# Patient Record
Sex: FEMALE | Race: BLACK OR AFRICAN AMERICAN | NOT HISPANIC OR LATINO | Employment: FULL TIME | URBAN - METROPOLITAN AREA
[De-identification: names, ages, dates, MRNs, and addresses within clinical notes are randomized per-mention and may not be internally consistent; named-entity substitution may affect disease eponyms.]

---

## 2017-03-08 ENCOUNTER — OFFICE VISIT (OUTPATIENT)
Dept: INTERNAL MEDICINE | Facility: CLINIC | Age: 42
End: 2017-03-08
Payer: COMMERCIAL

## 2017-03-08 VITALS
OXYGEN SATURATION: 99 % | HEART RATE: 76 BPM | WEIGHT: 168.63 LBS | HEIGHT: 63 IN | SYSTOLIC BLOOD PRESSURE: 112 MMHG | BODY MASS INDEX: 29.88 KG/M2 | DIASTOLIC BLOOD PRESSURE: 68 MMHG

## 2017-03-08 DIAGNOSIS — R51.9 NONINTRACTABLE HEADACHE, UNSPECIFIED CHRONICITY PATTERN, UNSPECIFIED HEADACHE TYPE: ICD-10-CM

## 2017-03-08 DIAGNOSIS — R11.10 VOMITING, INTRACTABILITY OF VOMITING NOT SPECIFIED, PRESENCE OF NAUSEA NOT SPECIFIED, UNSPECIFIED VOMITING TYPE: Primary | ICD-10-CM

## 2017-03-08 DIAGNOSIS — R05.9 COUGH: ICD-10-CM

## 2017-03-08 PROCEDURE — 99999 PR PBB SHADOW E&M-EST. PATIENT-LVL III: CPT | Mod: PBBFAC,,, | Performed by: FAMILY MEDICINE

## 2017-03-08 PROCEDURE — 99214 OFFICE O/P EST MOD 30 MIN: CPT | Mod: S$GLB,,, | Performed by: FAMILY MEDICINE

## 2017-03-08 RX ORDER — ONDANSETRON 4 MG/1
4-8 TABLET, FILM COATED ORAL EVERY 8 HOURS PRN
Qty: 30 TABLET | Refills: 0 | Status: SHIPPED | OUTPATIENT
Start: 2017-03-08 | End: 2018-03-14

## 2017-03-08 RX ORDER — OMEPRAZOLE 20 MG/1
20 CAPSULE, DELAYED RELEASE ORAL DAILY
Qty: 30 CAPSULE | Refills: 2 | Status: SHIPPED | OUTPATIENT
Start: 2017-03-08 | End: 2018-03-14

## 2017-03-08 NOTE — MR AVS SNAPSHOT
[facility]  [address]  [place] [address]  Phone: [phone]  Fax: [phone]                  Nora Conklin   3/8/2017 2:40 PM   Office Visit    Description:  Female : 1975   Provider:  Issac Temple MD   Department:  [facility] - Internal Medicine           Reason for Visit     Nausea           Diagnoses this Visit        Comments    Vomiting, intractability of vomiting not specified, presence of nausea not specified, unspecified vomiting type    -  Primary     Nonintractable headache, unspecified chronicity pattern, unspecified headache type         Cough                To Do List           Future Appointments        Provider Department Dept Phone    3/8/2017 2:40 PM Issac Temple MD [facility] [phone]      Goals (5 Years of Data)     None      Follow-Up and Disposition     Return if symptoms worsen or fail to improve, for Call or message through Portal for status update..       These Medications        Disp Refills Start End    omeprazole (PRILOSEC) 20 MG capsule 30 capsule 2 3/8/2017     Take 1 capsule (20 mg total) by mouth once daily. - Oral    Pharmacy: [facility] - [place], [place] - [address] Ph #: [phone]       ondansetron (ZOFRAN) 4 MG tablet 30 tablet 0 3/8/2017     Take 1-2 tablets (4-8 mg total) by mouth every 8 (eight) hours as needed for Nausea. - Oral    Pharmacy: [facility] - [place], [place] - [address] Ph #: [phone]         [facility]     [facility] Care [facility] -  Assistance  Registered nurses in the [facility] provide clinical advisement, health education, appointment booking, and other advisory services.  Call for this free service at [phone].             Medications           Message regarding Medications     Verify the changes and/or additions to your medication regime listed below are the same as  "discussed with your clinician today.  If any of these changes or additions are incorrect, please notify your healthcare provider.        START taking these NEW medications        Refills    omeprazole (PRILOSEC) 20 MG capsule 2    Sig: Take 1 capsule (20 mg total) by mouth once daily.    Class: Normal    Route: Oral    ondansetron (ZOFRAN) 4 MG tablet 0    Sig: Take 1-2 tablets (4-8 mg total) by mouth every 8 (eight) hours as needed for Nausea.    Class: Normal    Route: Oral           Verify that the below list of medications is an accurate representation of the medications you are currently taking.  If none reported, the list may be blank. If incorrect, please contact your healthcare provider. Carry this list with you in case of emergency.           Current Medications     amitriptyline (ELAVIL) 10 MG tablet 2 pills every night around dinner time every night    ASPIRIN/ACETAMINOPHEN/CAFFEINE (GOODY'S MIGRAINE RELIEF ORAL) Take by mouth.    sumatriptan (IMITREX) 100 MG tablet Onset migraine, 1 tab, second 2 hours later, no more 2 tabs day/3 days use in week    omeprazole (PRILOSEC) 20 MG capsule Take 1 capsule (20 mg total) by mouth once daily.    ondansetron (ZOFRAN) 4 MG tablet Take 1-2 tablets (4-8 mg total) by mouth every 8 (eight) hours as needed for Nausea.           Clinical Reference Information           Your Vitals Were     BP Pulse Height Weight SpO2 BMI    112/68 (BP Location: Left arm, Patient Position: Sitting) 76 5' 3" (1.6 m) 76.5 kg (168 lb 10.4 oz) 99% 29.88 kg/m2      Blood Pressure          Most Recent Value    BP  112/68      Allergies as of 3/8/2017     No Known Allergies      Immunizations Administered on Date of Encounter - 3/8/2017     None      Language Assistance Services     ATTENTION: Language assistance services are available, free of charge. Please call [phone].      ATENCIÓN: Si habla christy, tiene a ovalle disposición servicios gratuitos de asistencia lingüística. Llame al " [phone].     SIMONE Ý: N?u b?n nói Ti?ng Vi?t, có các d?ch v? h? tr? ngôn ng? mi?n phí dành cho b?n. G?i s? [phone].         [facility] complies with applicable Federal civil rights laws and does not discriminate on the basis of race, color, national origin, age, disability, or sex.
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
## 2017-07-24 ENCOUNTER — LAB VISIT (OUTPATIENT)
Dept: LAB | Facility: HOSPITAL | Age: 42
End: 2017-07-24
Attending: INTERNAL MEDICINE
Payer: COMMERCIAL

## 2017-07-24 DIAGNOSIS — R53.83 FATIGUE, UNSPECIFIED TYPE: Primary | ICD-10-CM

## 2017-07-24 DIAGNOSIS — R53.83 FATIGUE, UNSPECIFIED TYPE: ICD-10-CM

## 2017-07-24 LAB
25(OH)D3+25(OH)D2 SERPL-MCNC: 44 NG/ML
ALBUMIN SERPL BCP-MCNC: 3.6 G/DL
ALP SERPL-CCNC: 62 U/L
ALT SERPL W/O P-5'-P-CCNC: 10 U/L
ANION GAP SERPL CALC-SCNC: 9 MMOL/L
AST SERPL-CCNC: 14 U/L
BASOPHILS # BLD AUTO: 0.03 K/UL
BASOPHILS NFR BLD: 0.5 %
BILIRUB SERPL-MCNC: 0.6 MG/DL
BUN SERPL-MCNC: 15 MG/DL
CALCIUM SERPL-MCNC: 8.9 MG/DL
CHLORIDE SERPL-SCNC: 104 MMOL/L
CO2 SERPL-SCNC: 24 MMOL/L
CREAT SERPL-MCNC: 0.7 MG/DL
DIFFERENTIAL METHOD: ABNORMAL
EOSINOPHIL # BLD AUTO: 0.2 K/UL
EOSINOPHIL NFR BLD: 3.3 %
ERYTHROCYTE [DISTWIDTH] IN BLOOD BY AUTOMATED COUNT: 12.6 %
EST. GFR  (AFRICAN AMERICAN): >60 ML/MIN/1.73 M^2
EST. GFR  (NON AFRICAN AMERICAN): >60 ML/MIN/1.73 M^2
GLUCOSE SERPL-MCNC: 79 MG/DL
HCT VFR BLD AUTO: 36.2 %
HGB BLD-MCNC: 12.2 G/DL
LYMPHOCYTES # BLD AUTO: 3.1 K/UL
LYMPHOCYTES NFR BLD: 50.5 %
MCH RBC QN AUTO: 30 PG
MCHC RBC AUTO-ENTMCNC: 33.7 G/DL
MCV RBC AUTO: 89 FL
MONOCYTES # BLD AUTO: 0.5 K/UL
MONOCYTES NFR BLD: 8.9 %
NEUTROPHILS # BLD AUTO: 2.2 K/UL
NEUTROPHILS NFR BLD: 36.6 %
PLATELET # BLD AUTO: 248 K/UL
PMV BLD AUTO: 9.7 FL
POTASSIUM SERPL-SCNC: 3.7 MMOL/L
PROT SERPL-MCNC: 7.4 G/DL
RBC # BLD AUTO: 4.06 M/UL
SODIUM SERPL-SCNC: 137 MMOL/L
TSH SERPL DL<=0.005 MIU/L-ACNC: 1.9 UIU/ML
WBC # BLD AUTO: 6.06 K/UL

## 2017-07-24 PROCEDURE — 82306 VITAMIN D 25 HYDROXY: CPT

## 2017-07-24 PROCEDURE — 80053 COMPREHEN METABOLIC PANEL: CPT

## 2017-07-24 PROCEDURE — 84443 ASSAY THYROID STIM HORMONE: CPT

## 2017-07-24 PROCEDURE — 36415 COLL VENOUS BLD VENIPUNCTURE: CPT

## 2017-07-24 PROCEDURE — 85025 COMPLETE CBC W/AUTO DIFF WBC: CPT

## 2017-10-24 ENCOUNTER — OFFICE VISIT (OUTPATIENT)
Dept: INTERNAL MEDICINE | Facility: CLINIC | Age: 42
End: 2017-10-24
Payer: COMMERCIAL

## 2017-10-24 ENCOUNTER — HOSPITAL ENCOUNTER (OUTPATIENT)
Dept: RADIOLOGY | Facility: HOSPITAL | Age: 42
Discharge: HOME | End: 2017-10-24
Attending: INTERNAL MEDICINE
Payer: COMMERCIAL

## 2017-10-24 VITALS
HEIGHT: 63 IN | HEART RATE: 90 BPM | WEIGHT: 167.31 LBS | TEMPERATURE: 98 F | DIASTOLIC BLOOD PRESSURE: 64 MMHG | SYSTOLIC BLOOD PRESSURE: 122 MMHG | BODY MASS INDEX: 29.64 KG/M2 | OXYGEN SATURATION: 99 %

## 2017-10-24 DIAGNOSIS — M54.50 ACUTE BILATERAL LOW BACK PAIN WITHOUT SCIATICA: Primary | ICD-10-CM

## 2017-10-24 DIAGNOSIS — M54.50 ACUTE BILATERAL LOW BACK PAIN WITHOUT SCIATICA: ICD-10-CM

## 2017-10-24 DIAGNOSIS — R82.90 ABNORMAL URINALYSIS: ICD-10-CM

## 2017-10-24 LAB
BILIRUB SERPL-MCNC: ABNORMAL MG/DL
BLOOD URINE, POC: 50
COLOR, POC UA: YELLOW
GLUCOSE UR QL STRIP: NORMAL
KETONES UR QL STRIP: ABNORMAL
LEUKOCYTE ESTERASE URINE, POC: ABNORMAL
NITRITE, POC UA: ABNORMAL
PH, POC UA: 5
PROTEIN, POC: ABNORMAL
SPECIFIC GRAVITY, POC UA: 1.02
UROBILINOGEN, POC UA: NORMAL

## 2017-10-24 PROCEDURE — 99213 OFFICE O/P EST LOW 20 MIN: CPT | Mod: 25,S$GLB,, | Performed by: INTERNAL MEDICINE

## 2017-10-24 PROCEDURE — 72110 X-RAY EXAM L-2 SPINE 4/>VWS: CPT | Mod: 26,,, | Performed by: RADIOLOGY

## 2017-10-24 PROCEDURE — 87086 URINE CULTURE/COLONY COUNT: CPT

## 2017-10-24 PROCEDURE — 99999 PR PBB SHADOW E&M-EST. PATIENT-LVL IV: CPT | Mod: PBBFAC,,, | Performed by: INTERNAL MEDICINE

## 2017-10-24 PROCEDURE — 72110 X-RAY EXAM L-2 SPINE 4/>VWS: CPT | Mod: TC

## 2017-10-24 PROCEDURE — 81002 URINALYSIS NONAUTO W/O SCOPE: CPT | Mod: S$GLB,,, | Performed by: INTERNAL MEDICINE

## 2017-10-24 RX ORDER — CYCLOBENZAPRINE HCL 5 MG
5 TABLET ORAL NIGHTLY
Qty: 10 TABLET | Refills: 0 | Status: SHIPPED | OUTPATIENT
Start: 2017-10-24 | End: 2017-11-03

## 2017-10-24 RX ORDER — IBUPROFEN 800 MG/1
800 TABLET ORAL EVERY 6 HOURS PRN
Qty: 40 TABLET | Refills: 0 | Status: SHIPPED | OUTPATIENT
Start: 2017-10-24 | End: 2017-11-03
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
## 2017-10-25 LAB
BACTERIA UR CULT: NORMAL
BACTERIA UR CULT: NORMAL

## 2018-03-14 ENCOUNTER — HOSPITAL ENCOUNTER (OUTPATIENT)
Dept: RADIOLOGY | Facility: OTHER | Age: 43
Discharge: HOME | End: 2018-03-14
Attending: OBSTETRICS & GYNECOLOGY
Payer: COMMERCIAL

## 2018-03-14 ENCOUNTER — OFFICE VISIT (OUTPATIENT)
Dept: OBSTETRICS AND GYNECOLOGY | Facility: CLINIC | Age: 43
End: 2018-03-14
Payer: COMMERCIAL

## 2018-03-14 VITALS
SYSTOLIC BLOOD PRESSURE: 102 MMHG | DIASTOLIC BLOOD PRESSURE: 70 MMHG | BODY MASS INDEX: 30.89 KG/M2 | WEIGHT: 174.38 LBS

## 2018-03-14 DIAGNOSIS — Z12.39 BREAST CANCER SCREENING: ICD-10-CM

## 2018-03-14 DIAGNOSIS — R19.00 PELVIC MASS: ICD-10-CM

## 2018-03-14 DIAGNOSIS — N94.3 PMS (PREMENSTRUAL SYNDROME): ICD-10-CM

## 2018-03-14 DIAGNOSIS — Z01.419 ENCOUNTER FOR GYNECOLOGICAL EXAMINATION WITHOUT ABNORMAL FINDING: Primary | ICD-10-CM

## 2018-03-14 DIAGNOSIS — Z11.3 SCREEN FOR STD (SEXUALLY TRANSMITTED DISEASE): ICD-10-CM

## 2018-03-14 PROCEDURE — 87624 HPV HI-RISK TYP POOLED RSLT: CPT

## 2018-03-14 PROCEDURE — 76856 US EXAM PELVIC COMPLETE: CPT | Mod: 26,,, | Performed by: INTERNAL MEDICINE

## 2018-03-14 PROCEDURE — 87491 CHLMYD TRACH DNA AMP PROBE: CPT

## 2018-03-14 PROCEDURE — 76830 TRANSVAGINAL US NON-OB: CPT | Mod: 26,,, | Performed by: INTERNAL MEDICINE

## 2018-03-14 PROCEDURE — 99386 PREV VISIT NEW AGE 40-64: CPT | Mod: S$GLB,,, | Performed by: OBSTETRICS & GYNECOLOGY

## 2018-03-14 PROCEDURE — 76830 TRANSVAGINAL US NON-OB: CPT | Mod: TC

## 2018-03-14 PROCEDURE — 88175 CYTOPATH C/V AUTO FLUID REDO: CPT

## 2018-03-14 PROCEDURE — 99999 PR PBB SHADOW E&M-EST. PATIENT-LVL III: CPT | Mod: PBBFAC,,, | Performed by: OBSTETRICS & GYNECOLOGY

## 2018-03-14 NOTE — PROGRESS NOTES
CC: Well woman exam    Nora Conklin is a 43 y.o. female  presents for a well woman exam.  She had a very light cycle and then a normal cycle  After that one cycle.        Past Medical History:   Diagnosis Date    Medical history non-contributory     Migraine headache        Past Surgical History:   Procedure Laterality Date     SECTION         OB History    Para Term  AB Living   3         3   SAB TAB Ectopic Multiple Live Births                  # Outcome Date GA Lbr Jamar/2nd Weight Sex Delivery Anes PTL Lv   3             2             1                    Family History   Problem Relation Age of Onset    Cancer Maternal Grandmother      lung     Cervical cancer Mother     Valvular heart disease Maternal Aunt        Social History   Substance Use Topics    Smoking status: Never Smoker    Smokeless tobacco: Never Used    Alcohol use 0.0 oz/week      Comment: rare       /70   Wt 79.1 kg (174 lb 6.1 oz)   LMP 2018   BMI 30.89 kg/m²     ROS:  GENERAL: Denies weight gain or weight loss. Feeling well overall.   SKIN: Denies rash or lesions.   HEAD: Denies head injury or headache.   NODES: Denies enlarged lymph nodes.   CHEST: Denies chest pain or shortness of breath.   CARDIOVASCULAR: Denies palpitations or left sided chest pain.   ABDOMEN: No abdominal pain, constipation, diarrhea, nausea, vomiting or rectal bleeding.   URINARY: No frequency, dysuria, hematuria, or burning on urination.  REPRODUCTIVE: See HPI.   BREASTS: The patient performs breast self-examination and denies pain, lumps, or nipple discharge.   HEMATOLOGIC: No easy bruisability or excessive bleeding.  MUSCULOSKELETAL: Denies joint pain or swelling.   NEUROLOGIC: Denies syncope or weakness.   PSYCHIATRIC: Denies depression, anxiety or mood swings.    Physical Exam:    APPEARANCE: Well nourished, well developed, in no acute distress.  AFFECT: WNL, alert and oriented x 3  SKIN: No  acne or hirsutism  NECK: Neck symmetric without masses or thyromegaly  NODES: No inguinal, cervical, axillary, or femoral lymph node enlargement  CHEST: Good respiratory effect  ABDOMEN: Soft.  No tenderness or masses.  No hepatosplenomegaly.  No hernias.  BREASTS: Symmetrical, no skin changes or visible lesions.  No palpable masses, nipple discharge bilaterally.  PELVIC: Normal external genitalia without lesions.  Normal hair distribution.  Adequate perineal body, normal urethral meatus.  Vagina moist and well rugated.  The cervix has a thin membranous adhesion of the cervix to the left side wall of the vagina.  It bleeds with manipulation.  Cervix pink, without lesions, discharge or tenderness.  No significant cystocele or rectocele.  Bimanual exam shows uterus to be normal size, regular, mobile and nontender.  Adnexa with nodular masses on the right ovary to vaginal sidewall where the adhesion is present MILD  tenderness.    EXTREMITIES: No edema.    ASSESSMENT AND PLAN  1. Encounter for gynecological examination without abnormal finding  Liquid-based pap smear, screening    HPV High Risk Genotypes, PCR   2. Screen for STD (sexually transmitted disease)  C. trachomatis/N. gonorrhoeae by AMP DNA Cervix    HIV-1 and HIV-2 antibodies    RPR    Hepatitis panel, acute   3. Breast cancer screening  Mammo Digital Screening Bilat with Tomosynthesis_CAD   4. PMS (premenstrual syndrome)     5. Pelvic mass  US Pelvis Comp with Transvag NON-OB (xpd     Follow up on the pelvic nodular mass and the vaginal findings  Plan to remove the adhesion.     Patient was counseled today on A.C.S. Pap guidelines and recommendations for yearly pelvic exams, mammograms and monthly self breast exams; to see her PCP for other health maintenance.     F/U after the US

## 2018-03-16 LAB
C TRACH DNA SPEC QL NAA+PROBE: NOT DETECTED
N GONORRHOEA DNA SPEC QL NAA+PROBE: NOT DETECTED

## 2018-03-19 ENCOUNTER — OFFICE VISIT (OUTPATIENT)
Dept: INTERNAL MEDICINE | Facility: CLINIC | Age: 43
End: 2018-03-19
Attending: FAMILY MEDICINE
Payer: COMMERCIAL

## 2018-03-19 ENCOUNTER — LAB VISIT (OUTPATIENT)
Dept: LAB | Facility: OTHER | Age: 43
End: 2018-03-19
Attending: FAMILY MEDICINE
Payer: COMMERCIAL

## 2018-03-19 VITALS — HEIGHT: 63 IN | BODY MASS INDEX: 31.25 KG/M2 | WEIGHT: 176.38 LBS

## 2018-03-19 DIAGNOSIS — Z00.00 ANNUAL PHYSICAL EXAM: ICD-10-CM

## 2018-03-19 DIAGNOSIS — G43.809 OTHER MIGRAINE WITHOUT STATUS MIGRAINOSUS, NOT INTRACTABLE: ICD-10-CM

## 2018-03-19 DIAGNOSIS — N94.6 MENSTRUAL CRAMP: ICD-10-CM

## 2018-03-19 DIAGNOSIS — Z00.00 ANNUAL PHYSICAL EXAM: Primary | ICD-10-CM

## 2018-03-19 LAB
25(OH)D3+25(OH)D2 SERPL-MCNC: 7 NG/ML
ALBUMIN SERPL BCP-MCNC: 3.4 G/DL
ALP SERPL-CCNC: 56 U/L
ALT SERPL W/O P-5'-P-CCNC: 12 U/L
ANION GAP SERPL CALC-SCNC: 7 MMOL/L
AST SERPL-CCNC: 14 U/L
BASOPHILS # BLD AUTO: 0.02 K/UL
BASOPHILS NFR BLD: 0.4 %
BILIRUB SERPL-MCNC: 0.4 MG/DL
BUN SERPL-MCNC: 14 MG/DL
CALCIUM SERPL-MCNC: 8.3 MG/DL
CHLORIDE SERPL-SCNC: 107 MMOL/L
CHOLEST SERPL-MCNC: 188 MG/DL
CHOLEST/HDLC SERPL: 4.3 {RATIO}
CO2 SERPL-SCNC: 26 MMOL/L
CREAT SERPL-MCNC: 0.7 MG/DL
DIFFERENTIAL METHOD: ABNORMAL
EOSINOPHIL # BLD AUTO: 0.2 K/UL
EOSINOPHIL NFR BLD: 3.7 %
ERYTHROCYTE [DISTWIDTH] IN BLOOD BY AUTOMATED COUNT: 12.9 %
EST. GFR  (AFRICAN AMERICAN): >60 ML/MIN/1.73 M^2
EST. GFR  (NON AFRICAN AMERICAN): >60 ML/MIN/1.73 M^2
ESTIMATED AVG GLUCOSE: 105 MG/DL
GLUCOSE SERPL-MCNC: 78 MG/DL
HBA1C MFR BLD HPLC: 5.3 %
HCT VFR BLD AUTO: 34 %
HDLC SERPL-MCNC: 44 MG/DL
HDLC SERPL: 23.4 %
HGB BLD-MCNC: 11.1 G/DL
LDLC SERPL CALC-MCNC: 129.4 MG/DL
LYMPHOCYTES # BLD AUTO: 2.3 K/UL
LYMPHOCYTES NFR BLD: 44.7 %
MCH RBC QN AUTO: 29.6 PG
MCHC RBC AUTO-ENTMCNC: 32.6 G/DL
MCV RBC AUTO: 91 FL
MONOCYTES # BLD AUTO: 0.5 K/UL
MONOCYTES NFR BLD: 9.9 %
NEUTROPHILS # BLD AUTO: 2.1 K/UL
NEUTROPHILS NFR BLD: 41.3 %
NONHDLC SERPL-MCNC: 144 MG/DL
PLATELET # BLD AUTO: 265 K/UL
PMV BLD AUTO: 9.9 FL
POTASSIUM SERPL-SCNC: 3.3 MMOL/L
PROT SERPL-MCNC: 6.9 G/DL
RBC # BLD AUTO: 3.75 M/UL
SODIUM SERPL-SCNC: 140 MMOL/L
TRIGL SERPL-MCNC: 73 MG/DL
TSH SERPL DL<=0.005 MIU/L-ACNC: 1.39 UIU/ML
WBC # BLD AUTO: 5.15 K/UL

## 2018-03-19 PROCEDURE — 80053 COMPREHEN METABOLIC PANEL: CPT

## 2018-03-19 PROCEDURE — 36415 COLL VENOUS BLD VENIPUNCTURE: CPT

## 2018-03-19 PROCEDURE — 80061 LIPID PANEL: CPT

## 2018-03-19 PROCEDURE — 84443 ASSAY THYROID STIM HORMONE: CPT

## 2018-03-19 PROCEDURE — 85025 COMPLETE CBC W/AUTO DIFF WBC: CPT

## 2018-03-19 PROCEDURE — 99999 PR PBB SHADOW E&M-EST. PATIENT-LVL III: CPT | Mod: PBBFAC,,, | Performed by: FAMILY MEDICINE

## 2018-03-19 PROCEDURE — 83036 HEMOGLOBIN GLYCOSYLATED A1C: CPT

## 2018-03-19 PROCEDURE — 82306 VITAMIN D 25 HYDROXY: CPT

## 2018-03-19 PROCEDURE — 99396 PREV VISIT EST AGE 40-64: CPT | Mod: S$GLB,,, | Performed by: FAMILY MEDICINE

## 2018-03-19 RX ORDER — IBUPROFEN 800 MG/1
800 TABLET ORAL 3 TIMES DAILY PRN
Qty: 90 TABLET | Refills: 1 | Status: SHIPPED | OUTPATIENT
Start: 2018-03-19 | End: 2019-02-11 | Stop reason: SDUPTHER

## 2018-03-19 RX ORDER — AMITRIPTYLINE HYDROCHLORIDE 10 MG/1
TABLET, FILM COATED ORAL
Qty: 180 TABLET | Refills: 3 | Status: SHIPPED | OUTPATIENT
Start: 2018-03-19 | End: 2019-04-16 | Stop reason: SDUPTHER

## 2018-03-19 RX ORDER — SUMATRIPTAN SUCCINATE 100 MG/1
TABLET ORAL
Qty: 12 TABLET | Refills: 3 | Status: SHIPPED | OUTPATIENT
Start: 2018-03-19 | End: 2022-05-10 | Stop reason: CLARIF

## 2018-03-19 NOTE — PATIENT INSTRUCTIONS
80-90 fl oz water daily     Meal Ideas for Regular Bariatric Diet  *Recipes and products available at [email or web address]      Breakfast: (15-20g protein)    - Egg white omelet: 2 egg whites or ½ cup Egg Beaters. (Optional proteins: cheese, shrimp, black beans, chicken, sliced turkey) (Optional veggies: tomatoes, salsa, spinach, mushrooms, onions, green peppers, or small slice avocado)     - Egg and sausage: 1 egg or ¼ cup Egg Beaters (any variety), with 1 tabitha or 2 links of Turkey sausage or Veggie breakfast sausage ([employer] or "[employer]    - Crust-less breakfast quiche: To make a glass pie dish, mix 4oz part skim Ricotta, 1 cup skim milk, and 2 eggs as your base. Add protein: shredded cheese, sliced lean ham or turkey, turkey golden/sausage. Add veggies: tomato, onion, green onion, mushroom, green pepper, spinach, etc.    - Yogurt parfait: Mix 1 - 6oz container Dannon Light N Fit vanilla yogurt, with ¼ cup Kashi Go Lean cereal    - Cottage cheese and fruit: ½ cup part-skim cottage cheese or ricotta cheese topped with fresh fruit or sugar free preserves     - Sharon Agata's Vanilla Egg custard* (add 2 Tbsp instant coffee granules to make Cappuccino Custard*)    - Hi-Protein café latte (skim milk, decaf coffee, 1 scoop protein powder). Optional to add Sugar free syrup or extract flavoring.    Lunch: (20-30g protein)    - ½ cup Black bean soup (Homemade or Progresso), with ¼ cup shredded low-fat cheese. Top with chopped tomato or fresh salsa.     - Lean deli turkey breast and low-fat sliced cheese, mustard or light calderon to moisten, rolled up together, or wrapped in a Hcino lettuce leaf    - Chicken salad made from dinner leftovers, moisten with low-fat salad dressing or light calderon. Also try leftover salmon, shrimp, tuna or boiled eggs. Serve ½ cup over dark green salad    - Fat-free canned refried beans, topped with ¼ cup shredded low-fat cheese. Top with chopped tomato or fresh salsa.     - Greek salad:  Top mixed greens with 1-2oz grilled chicken, tomatoes, red onions, 2-3 kalamata olives, and sprinkle lightly with feta cheese. Spritz with Balsamic vinegar to taste.     - Crust-less lunch quiche: To make a glass pie dish, mix 4oz part skim Ricotta, 1 cup skim milk, and 2 eggs as your base. Add protein: shredded cheese, sliced lean ham or turkey, shrimp, chicken. Add veggies: tomato, onion, green onion, mushroom, green pepper, spinach, artichoke, broccoli, etc.    - Pizza bake: tomato sauce, low-fat shredded mozzarella and turkey pepperoni or [place] golden. Add any veggies.    - Cucumber crab bites: Spread ¼ cup crab dip (lump crabmeat + light cream cheese and green onions) over sliced cucumber.     - Chicken with light spinach and artichoke dip*: Puree in : 6oz cooked and drained spinach, 2 cloves garlic, 1 can cannelloni beans, ½ cup chopped green onions, 1 can drained artichoke hearts (not marinated in oil), lemon juice and basil. Mix in 2oz chopped up chicken.    Supper: (20-30g protein)    - Serve grilled fish over dark green salad tossed with low-fat dressing, served with grilled asparagus brownlee     - Rotisserie chicken salad: served with sliced strawberries, walnuts, fat-free feta cheese crumbles and 1 tbsp Ahumadas Own Light Raspberry [place]wood Vinaigrette    - Shrimp cocktail: Dip cold boiled shrimp in homemade low-sugar cocktail sauce (1/2 cup Orlando One Carb ketchup, 2 tbsp horseradish, 1/4 tsp hot sauce, 1 tsp Worcestershire sauce, 1 tbsp freshly-squeezed lemon juice). Serve with dark green salad, walnuts, and crumbled blue cheese drizzled with olive oil and Balsamic vinegar    - Tuna Melt: Spread tuna salad onto 2 thick slices of tomato. Top with low-fat cheese and broil until cheese is melted. May also be made with chicken salad of shrimp salad. Rena Lara with different types of cheeses.    - Homemade low-fat Chili using extra lean ground beef or ground turkey. Top with shredded cheese  and salsa as desired. May add dollop fat-free sour cream if desired    - Dinner Omelet with shrimp or chicken and onion, green peppers and chives.    - No noodle lasagna: Use sliced zucchini or eggplant in place of noodles.  Layer with part skim ricotta cheese and low sugar meat sauce (use very lean ground beef or ground turkey).    - Mexican chicken bake: Bake chunks of chicken breast or thigh with taco seasoning, Pace brand enchilada sauce, green onions and low-fat cheese. Serve with ¼ cup black beans or fat free refried beans topped with chopped tomatoes or salsa.    - Alanis frozen meatballs, simmered in Classico Marinara sauce. Different flavors of salsa or spaghetti sauce create different dishes! Sprinkle with parmesan cheese. Serve with grilled or steamed veggies, or a dark green salad.    - Simmer boneless skinless chicken thigh chunks in Classico Marinara sauce or roasted salsa until tender with chopped onion, bell pepper, garlic, mushrooms, spinach, etc.     - Hamburger, without the bun, dressed the way you like. Served with grilled or steamed veggies.    - Eggplant parmesan: Bake slices of eggplant at 350 degrees for 15 minutes. Layer tomato sauce, sliced eggplant and low-fat mozzarella cheese in a baking dish and cover with foil. Bake 30-40 more minutes or until bubbly. Uncover and bake at 400 degrees for about 15 more minutes, or until top is slightly crisp.    - Fish tacos: grilled/baked white fish, wrapped in Chino lettuce leaf, topped with salsa, shredded low-fat cheese, and light coleslaw.    Snacks: (100-200 calories; >5g protein)    - 1 low-fat cheese stick with 8 cherry tomatoes or 1 serving fresh fruit  - 4 thin slices fat-free turkey breast and 1 slice low-fat cheese  - 4 thin slices fat-free honey ham with wedge of melon  - 1/4 cup unsalted nuts with ½ cup fruit  - 6-oz container Dannon Light n Fit vanilla yogurt, topped with 1oz unsalted nuts         - apple, celery or baby carrots  spread with 2 Tbsp natural peanut butter or almond butter   - apple slices with 1 oz slice low-fat cheese  - celery, cucumber, bell pepper or baby carrots dipped in ¼ cup hummus bean spread or light spinach and artichoke dip (*recipe in lunch section)  - 100 calorie bag microwave light popcorn with 3 tbsp grated parmesan cheese  - Jett Links Beef Steak - 14g protein! (similar to beef jerky)  - 2 wedges Laughing Cow - Light Herb & Garlic Cheese with sliced cucumber or green bell pepper  - 1/2 cup low-fat cottage cheese with ¼ cup fruit or ¼ cup salsa  - RTD Protein drinks: Atkins, Low Carb Slim Fast, EAS light, Muscle Milk Light, etc.  - Homemade Protein drinks: GNC Soy95, Isopure, Nectar, UNJURY, Whey Gourmet, etc. Mix 1 scoop powder with 8oz skim/1% milk or light soymilk.  - Protein bars: Atkins, EAS, Pure Protein, Think Thin, Detour, etc. Must have 0-4 grams sugar - Read the label.    Takeout Options: No more than twice/week  Deli - Salads (no pasta or rice), meats, cheeses. Roasted chicken. Lox (salmon)    Mexican - Platters which don't include tortillas, chips, or rice. Go easy on the beans. Example: Fajitas without the tortillas. Ask the  not to bring chips to the table if they are too tempting.    Greek - Meat or fish and vegetable, but no bread or rice. Including hummus, baba ganoush, etc, is OK. Most sit-down Greek restaurants can provide you with cucumber slices for dipping instead of kenia bread.    Fast Food (Avoid as much as possible) - Salads (no croutons and limit salad dressing to 2 tbsp), grilled chicken sandwich without the bun and ask for no calderon. Mindys low fat chili or Taco Bell pintos and cheese.    BBQ - The meats are fine if you ask for sauces on the side, but most of the traditional side dishes are loaded with carbs. Alban slaw, baked beans and BBQ sauce are typically made with sugar.    Chinese - Nothing deep-fried, no rice or noodles. Many Chinese sauces have starch and sugar in them,  so you'll have to use your judgement. If you find that these sauces trigger cravings, or cause Dumping, you can ask for the sauce to be made without sugar or just use soy sauce.

## 2018-03-19 NOTE — PROGRESS NOTES
"Subjective:      Patient ID: Nora Conklin is a 43 y.o. female.    Chief Complaint: Annual Exam    She is here for annual exam. She wants ibuprofen for her menstrual cramps. This typically helps with her pain. She does have history of migraines and she has noticed they come once a week. The elavil does help a lot to reduce the frequency. She will take the imitrex to stop the headache which does help.       Review of Systems   Constitutional: Negative.    HENT: Negative.    Respiratory: Negative.    Cardiovascular: Negative.    Gastrointestinal: Negative.    Genitourinary: Negative.    Neurological: Positive for headaches.     I personally reviewed Past Medical History, Past Surgical history,  Past Social History and Family History    Objective:   Ht 5' 3" (1.6 m)   Wt 80 kg (176 lb 5.9 oz)   LMP 02/24/2018   BMI 31.24 kg/m²     Physical Exam   Constitutional: She is oriented to person, place, and time. She appears well-developed and well-nourished. No distress.   HENT:   Head: Normocephalic.   Right Ear: External ear normal.   Left Ear: External ear normal.   Mouth/Throat: Oropharynx is clear and moist.   Eyes: Conjunctivae and EOM are normal. Pupils are equal, round, and reactive to light. Right eye exhibits no discharge. Left eye exhibits no discharge. No scleral icterus.   Neck: Normal range of motion. No tracheal deviation present. No thyromegaly present.   Cardiovascular: Normal rate, regular rhythm, normal heart sounds and intact distal pulses.  Exam reveals no gallop.    No murmur heard.  Pulmonary/Chest: Effort normal and breath sounds normal. No respiratory distress. She has no wheezes. She has no rales. She exhibits no tenderness.   Abdominal: Soft. Bowel sounds are normal. She exhibits no distension and no mass. There is no tenderness. There is no rebound and no guarding.   Musculoskeletal: Normal range of motion.   Neurological: She is alert and oriented to person, place, and time.   Skin: Skin is " warm and dry.   Psychiatric: She has a normal mood and affect. Her behavior is normal. Judgment and thought content normal.   Vitals reviewed.      Nora was seen today for annual exam.    Diagnoses and all orders for this visit:    Annual physical exam  -     CBC auto differential; Future  -     Comprehensive metabolic panel; Future  -     Hemoglobin A1c; Future  -     Lipid panel; Future  -     TSH; Future  -     Vitamin D; Future    Other migraine without status migrainosus, not intractable  -improved on elavil and imitrex prn   -call if no improvement once resuming regimen     Menstrual cramp  -ibuprofen prn and patient not to take goody's with it or the days surrounding use     Other orders  -     amitriptyline (ELAVIL) 10 MG tablet; 2 pills every night around dinner time every night  -     sumatriptan (IMITREX) 100 MG tablet; Onset migraine, 1 tab, second 2 hours later, no more 2 tabs day/3 days use in week  -     ibuprofen (ADVIL,MOTRIN) 800 MG tablet; Take 1 tablet (800 mg total) by mouth 3 (three) times daily as needed for Pain.

## 2018-03-20 LAB
HPV16 AG SPEC QL: NEGATIVE
HPV16+18+H RISK 12 DNA CVX-IMP: NEGATIVE
HPV18 DNA SPEC QL NAA+PROBE: NEGATIVE

## 2018-03-28 ENCOUNTER — PATIENT MESSAGE (OUTPATIENT)
Dept: INTERNAL MEDICINE | Facility: CLINIC | Age: 43
End: 2018-03-28

## 2018-03-28 ENCOUNTER — OFFICE VISIT (OUTPATIENT)
Dept: OBSTETRICS AND GYNECOLOGY | Facility: CLINIC | Age: 43
End: 2018-03-28
Payer: COMMERCIAL

## 2018-03-28 VITALS
WEIGHT: 173.06 LBS | HEIGHT: 63 IN | BODY MASS INDEX: 30.66 KG/M2 | DIASTOLIC BLOOD PRESSURE: 70 MMHG | SYSTOLIC BLOOD PRESSURE: 110 MMHG

## 2018-03-28 DIAGNOSIS — D64.9 NORMOCYTIC ANEMIA: Primary | ICD-10-CM

## 2018-03-28 DIAGNOSIS — N83.209 CYST OF OVARY, UNSPECIFIED LATERALITY: Primary | ICD-10-CM

## 2018-03-28 DIAGNOSIS — E87.6 HYPOKALEMIA: ICD-10-CM

## 2018-03-28 DIAGNOSIS — N89.5 VAGINAL ADHESION: ICD-10-CM

## 2018-03-28 PROCEDURE — 99213 OFFICE O/P EST LOW 20 MIN: CPT | Mod: S$GLB,,, | Performed by: OBSTETRICS & GYNECOLOGY

## 2018-03-28 PROCEDURE — 99999 PR PBB SHADOW E&M-EST. PATIENT-LVL III: CPT | Mod: PBBFAC,,, | Performed by: OBSTETRICS & GYNECOLOGY

## 2018-03-28 RX ORDER — ERGOCALCIFEROL 1.25 MG/1
50000 CAPSULE ORAL
Qty: 12 CAPSULE | Refills: 0 | Status: SHIPPED | OUTPATIENT
Start: 2018-03-28 | End: 2018-04-27

## 2018-03-28 NOTE — TELEPHONE ENCOUNTER
Potassium is low,  potassium in your diet--bananas, oranges, potatoes, spinach, tomatoes, etc. You may also look up on the internet for other options. We should recheck your potassium in 1-2 weeks    Please also schedule iron studies    Your vitamin D is low. I will send a prescription to the pharmacy for a weekly supplement you will take once weekly for 12 weeks, then after the 12 weeks you will need to take a daily supplement available over the counter of 1000 IU

## 2018-03-28 NOTE — PROGRESS NOTES
"CC: ovarian cyst    Nora Conklin is a 43 y.o. female  presents for a follow up after pelvic US  She denies any pain or concerns.  US shows  Transabdominal and transvaginal images were obtained.  The uterus measures 7.9 x 5 x 6.3 cm, within the normal range.  Endometrial stripe is 10 mm, normal.    Left ovary has a normal size and appearance and measures 2.9 x 1.7 x 2.1 cm.    The right ovary measures 4.3 x 3.7 x 4.6 cm.  It contains a 2.6 x 3.7 x 3.4 cm cyst with hypoechoic foci  within.  These complex regions contain no flow.     ovaries have arterial and venous flow.   Impression      3.7 cm hemorrhagic cyst within the right ovary.           Past Medical History:   Diagnosis Date    Medical history non-contributory     Migraine headache        Past Surgical History:   Procedure Laterality Date     SECTION         OB History    Para Term  AB Living   3         3   SAB TAB Ectopic Multiple Live Births                  # Outcome Date GA Lbr Jamar/2nd Weight Sex Delivery Anes PTL Lv   3             2             1                    Family History   Problem Relation Age of Onset    Cancer Maternal Grandmother      lung     Cervical cancer Mother     Valvular heart disease Maternal Aunt        Social History   Substance Use Topics    Smoking status: Never Smoker    Smokeless tobacco: Never Used    Alcohol use 0.0 oz/week      Comment: rare       /70 (BP Location: Left arm, Patient Position: Sitting, BP Method: Medium (Manual))   Ht 5' 3" (1.6 m)   Wt 78.5 kg (173 lb 1 oz)   LMP 2018 (Approximate)   BMI 30.66 kg/m²     ROS:  GENERAL: Denies weight gain or weight loss. Feeling well overall.   SKIN: Denies rash or lesions.   HEAD: Denies head injury or headache.   NODES: Denies enlarged lymph nodes.   CHEST: Denies chest pain or shortness of breath.   CARDIOVASCULAR: Denies palpitations or left sided chest pain.   ABDOMEN: No abdominal pain, " constipation, diarrhea, nausea, vomiting or rectal bleeding.   URINARY: No frequency, dysuria, hematuria, or burning on urination.  REPRODUCTIVE: See HPI.   BREASTS: The patient performs breast self-examination and denies pain, lumps, or nipple discharge.   HEMATOLOGIC: No easy bruisability or excessive bleeding.  MUSCULOSKELETAL: Denies joint pain or swelling.   NEUROLOGIC: Denies syncope or weakness.   PSYCHIATRIC: Denies depression, anxiety or mood swings.    Physical Exam:    APPEARANCE: Well nourished, well developed, in no acute distress.  AFFECT: WNL, alert and oriented x 3  SKIN: No acne or hirsutism  NECK: Neck symmetric without masses or thyromegaly  NODES: No inguinal, cervical, axillary, or femoral lymph node enlargement  CHEST: Good respiratory effect  PEdeferred    ASSESSMENT AND PLAN  1. Cyst of ovary, unspecified laterality  US Pelvis Comp with Transvag NON-OB (xpd   2. Vaginal adhesion       Asymptomatic with the vaginal lesion- if she develops pain or concerns I would recommend further intervention and consider biopsy in the office  F/U on ovarian cyst  If she develops any other concerns I would recommend a Pelvic MRI    Patient was counseled today on ovarian cyst and the vaginal adhesion.

## 2018-11-09 ENCOUNTER — HOSPITAL ENCOUNTER (OUTPATIENT)
Dept: CARDIOLOGY | Facility: OTHER | Age: 43
Discharge: HOME | End: 2018-11-09
Attending: FAMILY MEDICINE
Payer: COMMERCIAL

## 2018-11-09 ENCOUNTER — OFFICE VISIT (OUTPATIENT)
Dept: INTERNAL MEDICINE | Facility: CLINIC | Age: 43
End: 2018-11-09
Attending: FAMILY MEDICINE
Payer: COMMERCIAL

## 2018-11-09 VITALS
DIASTOLIC BLOOD PRESSURE: 60 MMHG | HEART RATE: 71 BPM | WEIGHT: 178.13 LBS | BODY MASS INDEX: 31.56 KG/M2 | OXYGEN SATURATION: 98 % | HEIGHT: 63 IN | SYSTOLIC BLOOD PRESSURE: 118 MMHG

## 2018-11-09 DIAGNOSIS — Z00.00 ANNUAL PHYSICAL EXAM: ICD-10-CM

## 2018-11-09 DIAGNOSIS — G43.809 OTHER MIGRAINE WITHOUT STATUS MIGRAINOSUS, NOT INTRACTABLE: Primary | ICD-10-CM

## 2018-11-09 DIAGNOSIS — E66.9 OBESITY, UNSPECIFIED CLASSIFICATION, UNSPECIFIED OBESITY TYPE, UNSPECIFIED WHETHER SERIOUS COMORBIDITY PRESENT: ICD-10-CM

## 2018-11-09 PROCEDURE — 99214 OFFICE O/P EST MOD 30 MIN: CPT | Mod: S$GLB,,, | Performed by: FAMILY MEDICINE

## 2018-11-09 PROCEDURE — 93005 ELECTROCARDIOGRAM TRACING: CPT

## 2018-11-09 PROCEDURE — 93010 ELECTROCARDIOGRAM REPORT: CPT | Mod: ,,, | Performed by: INTERNAL MEDICINE

## 2018-11-09 PROCEDURE — 3008F BODY MASS INDEX DOCD: CPT | Mod: CPTII,S$GLB,, | Performed by: FAMILY MEDICINE

## 2018-11-09 PROCEDURE — 99999 PR PBB SHADOW E&M-EST. PATIENT-LVL III: CPT | Mod: PBBFAC,,, | Performed by: FAMILY MEDICINE

## 2018-11-09 RX ORDER — TOPIRAMATE 25 MG/1
25 CAPSULE, EXTENDED RELEASE ORAL NIGHTLY
Qty: 30 CAPSULE | Refills: 1 | Status: SHIPPED | OUTPATIENT
Start: 2018-11-09 | End: 2019-05-01

## 2018-11-09 NOTE — PROGRESS NOTES
"Subjective:      Patient ID: Nora Conklin is a 43 y.o. female.    Chief Complaint: Weight Loss    HPI   She sleeps about 4-5 hours nightly. She is not exercising regularly.   Current attempts at weight loss: nothing   Previous diet attempts: low carb but unable to maintain it   History of medication for loss: none   Heaviest weight: 178  Lightest weight:160  Goal weight:160   Typical eating patterns: lives at home with son, she does the grocery shopping every other week     Breakfast:  Two boiled eggs      Lunch:  Skip   Fast food-[employer], [employer]    Dinner:  Fast food     Snacks:  Chips   cake    Beverages:  Water  Soda-sprite     BMR: 1439    Review of Systems   Constitutional: Negative for activity change, appetite change, chills, diaphoresis, fatigue, fever and unexpected weight change.   HENT: Negative for congestion, ear discharge, ear pain, hearing loss, postnasal drip, rhinorrhea, sinus pressure and sore throat.    Respiratory: Negative for cough, shortness of breath and wheezing.    Cardiovascular: Negative for chest pain.   Gastrointestinal: Negative for abdominal pain, constipation, diarrhea, nausea and vomiting.   Genitourinary: Negative for dysuria and frequency.   Musculoskeletal: Negative.    Psychiatric/Behavioral: Negative for suicidal ideas.     I personally reviewed Past Medical History, Past Surgical history,  Past Social History and Family History      Objective:   /60   Pulse 71   Ht 5' 3" (1.6 m)   Wt 80.8 kg (178 lb 2.1 oz)   SpO2 98%   BMI 31.55 kg/m²     Physical Exam   Constitutional: She is oriented to person, place, and time. She appears well-developed and well-nourished. No distress.   HENT:   Head: Normocephalic and atraumatic.   Right Ear: Hearing, tympanic membrane, external ear and ear canal normal.   Left Ear: Hearing, tympanic membrane, external ear and ear canal normal.   Nose: Nose normal.   Mouth/Throat: Uvula is midline and oropharynx is clear and moist. " No oropharyngeal exudate.   Eyes: Conjunctivae and EOM are normal. Pupils are equal, round, and reactive to light. Right eye exhibits no discharge. Left eye exhibits no discharge. No scleral icterus.   Neck: Normal range of motion. Neck supple.   Cardiovascular: Normal rate, regular rhythm, normal heart sounds and intact distal pulses. Exam reveals no gallop.   No murmur heard.  Pulmonary/Chest: Effort normal and breath sounds normal. No respiratory distress. She has no wheezes. She has no rales. She exhibits no tenderness.   Abdominal: Soft. Bowel sounds are normal. She exhibits no distension and no mass. There is no tenderness. There is no rebound and no guarding.   Neurological: She is alert and oriented to person, place, and time.   Skin: Skin is warm and dry.   Vitals reviewed.      1. Other migraine without status migrainosus, not intractable    2. Obesity, unspecified classification, unspecified obesity type, unspecified whether serious comorbidity present    3. Annual physical exam        1/2. Discussed the importance of increasing water intake and sleep, sleep hygiene handout given, she will trial period of topamax and call with any side effects  -return in 4 weeks with a food journal   Patient was informed that topiramate is used for migraine prevention and seizures. Weight loss is a common side effect that is well documented. They understand this. They were informed of the potential side effects such as serious and possibly fatal rash in which case the medication should be discontinued immediately. Paresthesias, forgetfulness, fatigue, kidney stones, GI symptoms, and changes in lab values such as electrolytes, blood counts and kidney function. This medication is also not safe in pregnancy and patient aware to use barrier contraception along with birth control while taking this medication.     3.ekg and labs       Orders Placed This Encounter   Procedures    SCHEDULED EKG 12-LEAD (to Muse)     Medications  Ordered This Encounter   Medications    topiramate (TROKENDI XR) 25 mg Cp24     Sig: Take 25 mg by mouth every evening.     Dispense:  30 capsule     Refill:  1

## 2018-11-09 NOTE — PATIENT INSTRUCTIONS
2555-9650 calories daily    fl oz water daily       Topiramate extended-release capsules  What is this medicine?  TOPIRAMATE (toe PYRE a mate) is used to treat seizures in adults or children with epilepsy.  How should I use this medicine?  Take this medicine by mouth with a glass of water. Follow the directions on the prescription label. Trokendi XR capsules must be swallowed whole. Do not sprinkle on food, break, crush, dissolve, or chew. Qudexy XR capsules may be swallowed whole or opened and sprinkled on a small amount of soft food. This mixture must be swallowed immediately. Do not chew or store mixture for later use. You may take this medicine with meals. Take your medicine at regular intervals. Do not take it more often than directed.  Talk to your pediatrician regarding the use of this medicine in children. Special care may be needed. While Trokendi XR may be prescribed for children as young as 6 years and Qudexy XR may be prescribed for children as young as 2 years for selected conditions, precautions do apply.  What side effects may I notice from receiving this medicine?  Side effects that you should report to your doctor or health care professional as soon as possible:  · allergic reactions like skin rash, itching or hives, swelling of the face, lips, or tongue  · decreased sweating and/or rise in body temperature  · depression  · difficulty breathing, fast or irregular breathing patterns  · difficulty speaking  · difficulty walking or controlling muscle movements  · hearing impairment  · redness, blistering, peeling or loosening of the skin, including inside the mouth  · tingling, pain or numbness in the hands or feet  · unusually weak or tired  · worsening of mood, thoughts or actions of suicide or dying  Side effects that usually do not require medical attention (Report these to your doctor or health care professional if they continue or are bothersome.):  · altered taste  · back pain, joint or  muscle aches and pains  · diarrhea, or constipation  · headache  · loss of appetite  · nausea  · stomach upset, indigestion  · tremors  What may interact with this medicine?  Do not take this medicine with any of the following medications:  · probenecid  This medicine may also interact with the following medications:  · acetazolamide  · alcohol  · amitriptyline  · birth control pills  · digoxin  · hydrochlorothiazide  · lithium  · medicines for pain, sleep, or muscle relaxation  · metformin  · methazolamide  · other seizure or epilepsy medicines  · pioglitazone  · risperidone  What if I miss a dose?  If you miss a dose, take it as soon as you can. If it is almost time for your next dose, take only that dose. Do not take double or extra doses.  Where should I keep my medicine?  Keep out of the reach of children.  Store at room temperature between 15 and 30 degrees C (59 and 86 degrees F) in a tightly closed container. Protect from moisture. Throw away any unused medicine after the expiration date.  What should I tell my health care provider before I take this medicine?  They need to know if you have any of these conditions:  · cirrhosis of the liver or liver disease  · diarrhea  · glaucoma  · kidney stones or kidney disease  · lung disease like asthma, obstructive pulmonary disease, emphysema  · metabolic acidosis  · on a ketogenic diet  · scheduled for surgery or a procedure  · suicidal thoughts, plans, or attempt; a previous suicide attempt by you or a family member  · an unusual or allergic reaction to topiramate, other medicines, foods, dyes, or preservatives  · pregnant or trying to get pregnant  · breast-feeding  What should I watch for while using this medicine?  Visit your doctor or health care professional for regular checks on your progress. Do not stop taking this medicine suddenly. This increases the risk of seizures if you are using this medicine to control epilepsy. Wear a medical identification  bracelet or chain to say you have epilepsy or seizures, and carry a card that lists all your medicines.  This medicine can decrease sweating and increase your body temperature. Watch for signs of  sweating or fever, especially in children. Avoid extreme heat, hot baths, and saunas. Be careful about exercising, especially in hot weather. Contact your health care provider right away if you notice a fever or decrease in sweating.  You should drink plenty of fluids while taking this medicine. If you have had kidney stones in the past, this will help to reduce your chances of forming kidney stones.  If you have stomach pain, with nausea or vomiting and yellowing of your eyes or skin, call your doctor immediately.  You may get drowsy, dizzy, or have blurred vision. Do not drive, use machinery, or do anything that needs mental alertness until you know how this medicine affects you. To reduce dizziness, do not sit or stand up quickly, especially if you are an older patient. Alcohol can increase drowsiness and dizziness. Avoid alcoholic drinks. Do not drink alcohol for 6 hours before or 6 hours after taking Trokendi XR.  If you notice blurred vision, eye pain, or other eye problems, seek medical attention at once for an eye exam.  The use of this medicine may increase the chance of suicidal thoughts or actions. Pay special attention to how you are responding while on this medicine. Any worsening of mood, or thoughts of suicide or dying should be reported to your health care professional right away.  This medicine may increase the chance of developing metabolic acidosis. If left untreated, this can cause kidney stones, bone disease, or slowed growth in children. Symptoms include breathing fast, fatigue, loss of appetite, irregular heartbeat, or loss of consciousness. Call your doctor immediately if you experience any of these side effects. Also, tell your doctor about any surgery you plan on having while taking this  medicine since this may increase your risk for metabolic acidosis.  Birth control pills may not work properly while you are taking this medicine. Talk to your doctor about using an extra method of birth control.  Women who become pregnant while using this medicine may enroll in the [place] Antiepileptic Drug Pregnancy Registry by calling 1-713.765.9731. This registry collects information about the safety of antiepileptic drug use during pregnancy.  NOTE:This sheet is a summary. It may not cover all possible information. If you have questions about this medicine, talk to your doctor, pharmacist, or health care provider. Copyright© 2017 [employer]    Take fiber supplements such as Metamucil, Citrucel, Konsyl, etc. (Benefiber is less effective so avoid)  The goal is 1-2 nonstraining nonloose bowel movements per day.  In general we recommend about 25-30 grams of fiber daily or reaching the above bowel movement goal.

## 2019-02-11 ENCOUNTER — LAB VISIT (OUTPATIENT)
Dept: LAB | Facility: HOSPITAL | Age: 44
End: 2019-02-11
Attending: INTERNAL MEDICINE
Payer: COMMERCIAL

## 2019-02-11 ENCOUNTER — TELEPHONE (OUTPATIENT)
Dept: INTERNAL MEDICINE | Facility: CLINIC | Age: 44
End: 2019-02-11

## 2019-02-11 DIAGNOSIS — N39.0 URINARY TRACT INFECTION WITHOUT HEMATURIA, SITE UNSPECIFIED: ICD-10-CM

## 2019-02-11 DIAGNOSIS — N39.0 URINARY TRACT INFECTION WITHOUT HEMATURIA, SITE UNSPECIFIED: Primary | ICD-10-CM

## 2019-02-11 LAB
BACTERIA #/AREA URNS AUTO: ABNORMAL /HPF
BILIRUB UR QL STRIP: NEGATIVE
CLARITY UR REFRACT.AUTO: ABNORMAL
COLOR UR AUTO: YELLOW
GLUCOSE UR QL STRIP: NEGATIVE
HGB UR QL STRIP: ABNORMAL
KETONES UR QL STRIP: NEGATIVE
LEUKOCYTE ESTERASE UR QL STRIP: ABNORMAL
MICROSCOPIC COMMENT: ABNORMAL
NITRITE UR QL STRIP: POSITIVE
PH UR STRIP: 5 [PH] (ref 5–8)
PROT UR QL STRIP: NEGATIVE
RBC #/AREA URNS AUTO: 2 /HPF (ref 0–4)
SP GR UR STRIP: 1.03 (ref 1–1.03)
SQUAMOUS #/AREA URNS AUTO: 4 /HPF
URN SPEC COLLECT METH UR: ABNORMAL
WBC #/AREA URNS AUTO: 54 /HPF (ref 0–5)

## 2019-02-11 PROCEDURE — 87086 URINE CULTURE/COLONY COUNT: CPT

## 2019-02-11 PROCEDURE — 87186 SC STD MICRODIL/AGAR DIL: CPT

## 2019-02-11 PROCEDURE — 81001 URINALYSIS AUTO W/SCOPE: CPT

## 2019-02-11 PROCEDURE — 87077 CULTURE AEROBIC IDENTIFY: CPT

## 2019-02-11 PROCEDURE — 87088 URINE BACTERIA CULTURE: CPT

## 2019-02-11 RX ORDER — SULFAMETHOXAZOLE AND TRIMETHOPRIM 800; 160 MG/1; MG/1
1 TABLET ORAL 2 TIMES DAILY
Qty: 14 TABLET | Refills: 0 | Status: SHIPPED | OUTPATIENT
Start: 2019-02-11 | End: 2019-02-18

## 2019-02-11 RX ORDER — IBUPROFEN 800 MG/1
800 TABLET ORAL 3 TIMES DAILY PRN
Qty: 90 TABLET | Refills: 1 | Status: SHIPPED | OUTPATIENT
Start: 2019-02-11 | End: 2019-10-10 | Stop reason: SDUPTHER

## 2019-02-13 LAB — BACTERIA UR CULT: NORMAL

## 2019-03-19 ENCOUNTER — HOSPITAL ENCOUNTER (OUTPATIENT)
Dept: RADIOLOGY | Facility: HOSPITAL | Age: 44
Discharge: HOME | End: 2019-03-19
Attending: OBSTETRICS & GYNECOLOGY
Payer: COMMERCIAL

## 2019-03-19 DIAGNOSIS — Z12.39 BREAST CANCER SCREENING: ICD-10-CM

## 2019-03-19 PROCEDURE — 77067 SCR MAMMO BI INCL CAD: CPT | Mod: TC,PO

## 2019-04-16 ENCOUNTER — PATIENT MESSAGE (OUTPATIENT)
Dept: INTERNAL MEDICINE | Facility: CLINIC | Age: 44
End: 2019-04-16

## 2019-04-16 DIAGNOSIS — G43.809 OTHER MIGRAINE WITHOUT STATUS MIGRAINOSUS, NOT INTRACTABLE: ICD-10-CM

## 2019-04-16 DIAGNOSIS — Z00.00 ANNUAL PHYSICAL EXAM: ICD-10-CM

## 2019-04-16 DIAGNOSIS — E66.9 OBESITY, UNSPECIFIED CLASSIFICATION, UNSPECIFIED OBESITY TYPE, UNSPECIFIED WHETHER SERIOUS COMORBIDITY PRESENT: ICD-10-CM

## 2019-04-16 RX ORDER — TOPIRAMATE 25 MG/1
25 CAPSULE, EXTENDED RELEASE ORAL NIGHTLY
Qty: 90 CAPSULE | Refills: 0 | OUTPATIENT
Start: 2019-04-16

## 2019-04-16 RX ORDER — AMITRIPTYLINE HYDROCHLORIDE 10 MG/1
TABLET, FILM COATED ORAL
Qty: 180 TABLET | Refills: 3 | Status: SHIPPED | OUTPATIENT
Start: 2019-04-16 | End: 2020-07-27 | Stop reason: SDUPTHER

## 2019-05-01 ENCOUNTER — LAB VISIT (OUTPATIENT)
Dept: LAB | Facility: OTHER | Age: 44
End: 2019-05-01
Attending: FAMILY MEDICINE
Payer: COMMERCIAL

## 2019-05-01 ENCOUNTER — OFFICE VISIT (OUTPATIENT)
Dept: INTERNAL MEDICINE | Facility: CLINIC | Age: 44
End: 2019-05-01
Attending: FAMILY MEDICINE
Payer: COMMERCIAL

## 2019-05-01 VITALS
DIASTOLIC BLOOD PRESSURE: 78 MMHG | WEIGHT: 174.19 LBS | BODY MASS INDEX: 32.05 KG/M2 | HEIGHT: 62 IN | OXYGEN SATURATION: 100 % | SYSTOLIC BLOOD PRESSURE: 100 MMHG | HEART RATE: 84 BPM

## 2019-05-01 DIAGNOSIS — D50.9 IRON DEFICIENCY ANEMIA, UNSPECIFIED IRON DEFICIENCY ANEMIA TYPE: ICD-10-CM

## 2019-05-01 DIAGNOSIS — Z00.00 ANNUAL PHYSICAL EXAM: Primary | ICD-10-CM

## 2019-05-01 DIAGNOSIS — E66.9 OBESITY, UNSPECIFIED CLASSIFICATION, UNSPECIFIED OBESITY TYPE, UNSPECIFIED WHETHER SERIOUS COMORBIDITY PRESENT: ICD-10-CM

## 2019-05-01 DIAGNOSIS — Z00.00 ANNUAL PHYSICAL EXAM: ICD-10-CM

## 2019-05-01 LAB
25(OH)D3+25(OH)D2 SERPL-MCNC: 9 NG/ML (ref 30–96)
ALBUMIN SERPL BCP-MCNC: 3.5 G/DL (ref 3.5–5.2)
ALP SERPL-CCNC: 63 U/L (ref 55–135)
ALT SERPL W/O P-5'-P-CCNC: 12 U/L (ref 10–44)
ANION GAP SERPL CALC-SCNC: 8 MMOL/L (ref 8–16)
AST SERPL-CCNC: 14 U/L (ref 10–40)
BASOPHILS # BLD AUTO: 0.02 K/UL (ref 0–0.2)
BASOPHILS NFR BLD: 0.3 % (ref 0–1.9)
BILIRUB SERPL-MCNC: 0.8 MG/DL (ref 0.1–1)
BUN SERPL-MCNC: 10 MG/DL (ref 6–20)
CALCIUM SERPL-MCNC: 9 MG/DL (ref 8.7–10.5)
CHLORIDE SERPL-SCNC: 104 MMOL/L (ref 95–110)
CHOLEST SERPL-MCNC: 196 MG/DL (ref 120–199)
CHOLEST/HDLC SERPL: 4.4 {RATIO} (ref 2–5)
CO2 SERPL-SCNC: 23 MMOL/L (ref 23–29)
CREAT SERPL-MCNC: 0.8 MG/DL (ref 0.5–1.4)
DIFFERENTIAL METHOD: ABNORMAL
EOSINOPHIL # BLD AUTO: 0.2 K/UL (ref 0–0.5)
EOSINOPHIL NFR BLD: 3.5 % (ref 0–8)
ERYTHROCYTE [DISTWIDTH] IN BLOOD BY AUTOMATED COUNT: 12.8 % (ref 11.5–14.5)
EST. GFR  (AFRICAN AMERICAN): >60 ML/MIN/1.73 M^2
EST. GFR  (NON AFRICAN AMERICAN): >60 ML/MIN/1.73 M^2
FERRITIN SERPL-MCNC: 21 NG/ML (ref 20–300)
GLUCOSE SERPL-MCNC: 85 MG/DL (ref 70–110)
HCT VFR BLD AUTO: 36.4 % (ref 37–48.5)
HDLC SERPL-MCNC: 45 MG/DL (ref 40–75)
HDLC SERPL: 23 % (ref 20–50)
HGB BLD-MCNC: 12.1 G/DL (ref 12–16)
LDLC SERPL CALC-MCNC: 136.8 MG/DL (ref 63–159)
LYMPHOCYTES # BLD AUTO: 2.7 K/UL (ref 1–4.8)
LYMPHOCYTES NFR BLD: 45.9 % (ref 18–48)
MCH RBC QN AUTO: 29.7 PG (ref 27–31)
MCHC RBC AUTO-ENTMCNC: 33.2 G/DL (ref 32–36)
MCV RBC AUTO: 89 FL (ref 82–98)
MONOCYTES # BLD AUTO: 0.4 K/UL (ref 0.3–1)
MONOCYTES NFR BLD: 7.4 % (ref 4–15)
NEUTROPHILS # BLD AUTO: 2.5 K/UL (ref 1.8–7.7)
NEUTROPHILS NFR BLD: 42.7 % (ref 38–73)
NONHDLC SERPL-MCNC: 151 MG/DL
PLATELET # BLD AUTO: 261 K/UL (ref 150–350)
PMV BLD AUTO: 9.6 FL (ref 9.2–12.9)
POTASSIUM SERPL-SCNC: 3.6 MMOL/L (ref 3.5–5.1)
PROT SERPL-MCNC: 7.3 G/DL (ref 6–8.4)
RBC # BLD AUTO: 4.07 M/UL (ref 4–5.4)
SODIUM SERPL-SCNC: 135 MMOL/L (ref 136–145)
T4 FREE SERPL-MCNC: 0.86 NG/DL (ref 0.71–1.51)
TRIGL SERPL-MCNC: 71 MG/DL (ref 30–150)
TSH SERPL DL<=0.005 MIU/L-ACNC: 1.5 UIU/ML (ref 0.4–4)
VIT B12 SERPL-MCNC: 210 PG/ML (ref 210–950)
WBC # BLD AUTO: 5.92 K/UL (ref 3.9–12.7)

## 2019-05-01 PROCEDURE — 82607 VITAMIN B-12: CPT

## 2019-05-01 PROCEDURE — 82728 ASSAY OF FERRITIN: CPT

## 2019-05-01 PROCEDURE — 84443 ASSAY THYROID STIM HORMONE: CPT

## 2019-05-01 PROCEDURE — 82306 VITAMIN D 25 HYDROXY: CPT

## 2019-05-01 PROCEDURE — 85025 COMPLETE CBC W/AUTO DIFF WBC: CPT

## 2019-05-01 PROCEDURE — 99999 PR PBB SHADOW E&M-EST. PATIENT-LVL III: ICD-10-PCS | Mod: PBBFAC,,, | Performed by: FAMILY MEDICINE

## 2019-05-01 PROCEDURE — 83036 HEMOGLOBIN GLYCOSYLATED A1C: CPT

## 2019-05-01 PROCEDURE — 99396 PREV VISIT EST AGE 40-64: CPT | Mod: S$GLB,,, | Performed by: FAMILY MEDICINE

## 2019-05-01 PROCEDURE — 99396 PR PREVENTIVE VISIT,EST,40-64: ICD-10-PCS | Mod: S$GLB,,, | Performed by: FAMILY MEDICINE

## 2019-05-01 PROCEDURE — 80053 COMPREHEN METABOLIC PANEL: CPT

## 2019-05-01 PROCEDURE — 99999 PR PBB SHADOW E&M-EST. PATIENT-LVL III: CPT | Mod: PBBFAC,,, | Performed by: FAMILY MEDICINE

## 2019-05-01 PROCEDURE — 36415 COLL VENOUS BLD VENIPUNCTURE: CPT

## 2019-05-01 PROCEDURE — 84439 ASSAY OF FREE THYROXINE: CPT

## 2019-05-01 PROCEDURE — 80061 LIPID PANEL: CPT

## 2019-05-01 RX ORDER — METFORMIN HYDROCHLORIDE 500 MG/1
500 TABLET ORAL
Qty: 30 TABLET | Refills: 3 | Status: SHIPPED | OUTPATIENT
Start: 2019-05-01 | End: 2019-05-29

## 2019-05-02 LAB
ESTIMATED AVG GLUCOSE: 108 MG/DL (ref 68–131)
HBA1C MFR BLD HPLC: 5.4 % (ref 4–5.6)

## 2019-05-02 RX ORDER — ERGOCALCIFEROL 1.25 MG/1
50000 CAPSULE ORAL
Qty: 12 CAPSULE | Refills: 0 | Status: SHIPPED | OUTPATIENT
Start: 2019-05-02 | End: 2022-05-10 | Stop reason: CLARIF

## 2019-05-29 ENCOUNTER — OFFICE VISIT (OUTPATIENT)
Dept: INTERNAL MEDICINE | Facility: CLINIC | Age: 44
End: 2019-05-29
Attending: FAMILY MEDICINE
Payer: COMMERCIAL

## 2019-05-29 VITALS
DIASTOLIC BLOOD PRESSURE: 80 MMHG | HEART RATE: 83 BPM | WEIGHT: 175.69 LBS | BODY MASS INDEX: 31.13 KG/M2 | SYSTOLIC BLOOD PRESSURE: 102 MMHG | HEIGHT: 63 IN | OXYGEN SATURATION: 98 %

## 2019-05-29 DIAGNOSIS — K59.00 CONSTIPATION, UNSPECIFIED CONSTIPATION TYPE: Primary | ICD-10-CM

## 2019-05-29 DIAGNOSIS — E66.9 OBESITY, UNSPECIFIED CLASSIFICATION, UNSPECIFIED OBESITY TYPE, UNSPECIFIED WHETHER SERIOUS COMORBIDITY PRESENT: ICD-10-CM

## 2019-05-29 PROCEDURE — 3008F PR BODY MASS INDEX (BMI) DOCUMENTED: ICD-10-PCS | Mod: CPTII,S$GLB,, | Performed by: FAMILY MEDICINE

## 2019-05-29 PROCEDURE — 99214 PR OFFICE/OUTPT VISIT, EST, LEVL IV, 30-39 MIN: ICD-10-PCS | Mod: S$GLB,,, | Performed by: FAMILY MEDICINE

## 2019-05-29 PROCEDURE — 99999 PR PBB SHADOW E&M-EST. PATIENT-LVL IV: ICD-10-PCS | Mod: PBBFAC,,, | Performed by: FAMILY MEDICINE

## 2019-05-29 PROCEDURE — 99214 OFFICE O/P EST MOD 30 MIN: CPT | Mod: S$GLB,,, | Performed by: FAMILY MEDICINE

## 2019-05-29 PROCEDURE — 99999 PR PBB SHADOW E&M-EST. PATIENT-LVL IV: CPT | Mod: PBBFAC,,, | Performed by: FAMILY MEDICINE

## 2019-05-29 PROCEDURE — 3008F BODY MASS INDEX DOCD: CPT | Mod: CPTII,S$GLB,, | Performed by: FAMILY MEDICINE

## 2019-05-29 RX ORDER — DIETHYLPROPION HYDROCHLORIDE 75 MG/1
75 TABLET, EXTENDED RELEASE ORAL DAILY
Qty: 30 TABLET | Refills: 0 | Status: SHIPPED | OUTPATIENT
Start: 2019-05-29 | End: 2019-10-10 | Stop reason: SDUPTHER

## 2019-05-29 RX ORDER — POLYETHYLENE GLYCOL 3350 17 G/17G
17 POWDER, FOR SOLUTION ORAL DAILY PRN
Qty: 510 G | Refills: 2 | Status: SHIPPED | OUTPATIENT
Start: 2019-05-29

## 2019-05-29 NOTE — PATIENT INSTRUCTIONS
Target heart rate 120-140 for 150 minutes weekly   90 fl oz water daily     1200 calories daily       Snacks: (100-200 calories; >5g protein)    - 1 low-fat cheese stick with 8 cherry tomatoes or 1 serving fresh fruit  - 4 thin slices fat-free turkey breast and 1 slice low-fat cheese  - 4 thin slices fat-free honey ham with wedge of melon  - 1/4 cup unsalted nuts with ½ cup fruit  - 6-oz container Dannon Light n Fit vanilla yogurt, topped with 1oz unsalted nuts         - apple, celery or baby carrots spread with 2 Tbsp natural peanut butter or almond butter   - apple slices with 1 oz slice low-fat cheese  - celery, cucumber, bell pepper or baby carrots dipped in ¼ cup hummus bean spread or light spinach and artichoke dip (*recipe in lunch section)  - 100 calorie bag microwave light popcorn with 3 tbsp grated parmesan cheese  - Jett Links Beef Steak - 14g protein! (similar to beef jerky)  - 2 wedges Laughing Cow - Light Herb & Garlic Cheese with sliced cucumber or green bell pepper  - 1/2 cup low-fat cottage cheese with ¼ cup fruit or ¼ cup salsa  - RTD Protein drinks: Atkins, Low Carb Slim Fast, EAS light, Muscle Milk Light, etc.  - Homemade Protein drinks: [employer] Soy95, Isopure, Nectar, UNJURY, Whey Gourmet, etc. Mix 1 scoop powder with 8oz skim/1% milk or light soymilk.  - Protein bars: Atkins, EAS, Pure Protein, Think Thin, Detour, etc. Must have 0-4 grams sugar - Read the label.            Diethylpropion extended-release tablets  What is this medicine?  DIETHYLPROPION (dye eth il PROE pee on) decreases your appetite. It is used with a reduced calorie diet and exercise to help you lose weight. This medicine is only meant to be used for a few weeks.  How should I use this medicine?  Take this medicine by mouth with a glass of water. Follow the directions on the prescription label. Swallow whole. Do not crush or chew. This medicine is usually taken mid morning. Take your doses at regular intervals. Do not take your  medicine more often than directed.  Talk to your pediatrician regarding the use of this medicine in children. Special care may be needed. While this medicine may be prescribed for children as young as 16 years for selected conditions, precautions do apply.  What side effects may I notice from receiving this medicine?  Side effects that you should report to your doctor or health care professional as soon as possible:  · allergic reactions like skin rash, itching or hives, swelling of the face, lips, or tongue  · abnormal heart rate or rhythm  · angry, confusion, more nervous, restless  · breast growth in men  · breathing problems  · changes in vision  · chest pain  · difficulty with balance  · fast, irregular heartbeat  · hallucinations  · nausea, vomiting  · seizures  · tremors  · trouble sleeping  Side effects that usually do not require medical attention (report to your doctor or health care professional if they continue or are bothersome):  · constipation or diarrhea  · dry mouth or unpleasant taste  · flushing of the skin  · hair loss  · headache  · increase or decrease in sexual desire or performance  · menstrual irregularity  · red or itchy skin  · upset stomach  What may interact with this medicine?  Do not take this medicine with any of the following medications:  · fluoxetine  · MAOIs like Carbex, Eldepryl, Marplan, Nardil, and Parnate  · medicines for colds or breathing difficulties like pseudoephedrine or phenylephrine  · other medicines or herbal products for weight loss or to decrease appetite  · procarbazine  · sibutramine  · stimulants like amphetamine, dextroamphetamine, dexmethylphenidate, methylphenidate or modafinil  This medicine may also interact with the following medications:  · general anesthetics  · insulin and other medicines for diabetes  · medicines for high blood pressure  · phenothiazines like chlorpromazine, mesoridazine, prochlorperazine, thioridazine  What if I miss a dose?  If you  miss a dose, do not take the tablet if it is later than early afternoon, since it may cause insomnia. Do not take double or extra doses.  Where should I keep my medicine?  Keep out of the reach of children. This medicine can be abused. Keep your medicine in a safe place to protect it from theft. Do not share this medicine with anyone. Selling or giving away this medicine is dangerous and against the law.  This medicine may cause accidental overdose and death if taken by other adults, children, or pets. Mix any unused medicine with a substance like cat litter or coffee grounds. Then throw the medicine away in a sealed container like a sealed bag or a coffee can with a lid. Do not use the medicine after the expiration date.  Store at room temperature below 30 degrees C (86 degrees F). Protect from heat. Keep container tightly closed.  What should I tell my health care provider before I take this medicine?  They need to know if you have any of these conditions:  · agitation  · glaucoma  · high blood pressure  · history of drug dependence or substance abuse  · hyperthyroid  · kidney or liver disease  · lung disease  · valvular heart disease  · an unusual or allergic reaction to diethylpropion, other medicines, foods, dyes, or preservatives  · pregnant or trying to get pregnant  · breast-feeding  What should I watch for while using this medicine?  Visit your doctor or health care professional for regular checks on your progress. You need to closely monitor your weight loss. If your rate of weight loss slows down or stops, you may need to stop the medicine, and restart after a time without the medicine.  You may get dizzy. Do not drive, use machinery, or do anything that needs mental alertness until you know how this medicine affects you. Do not stand or sit up quickly, especially if you are an older patient. This reduces the risk of dizzy or fainting spells. Alcohol may increase dizziness. Avoid alcoholic  drinks.  NOTE:This sheet is a summary. It may not cover all possible information. If you have questions about this medicine, talk to your doctor, pharmacist, or health care provider. Copyright© 2017 [employer]

## 2019-05-29 NOTE — PROGRESS NOTES
"Subjective:      Patient ID: Nora Conklin is a 44 y.o. female.    Chief Complaint: Follow-up    HPI   Patient here today for follow up. She is getting about 7-7.5 hours nightly. She is trying to get 15-30 minutes, 3 times a week, body weight exercises. She is drinking about 32 fl oz water daily. She is unsure if metformin is continuing to help, mainly helped the first week. She does not have her food journal with her today. She is having a bowel movement 1-2 times a week and not straining with it. She has not been experiencing migraines regularly, about once a month.     Breakfast:  Two boiled eggs    Oatmeal      Lunch:  Salads (regular ranch/chicken)  Lean cuisine (chicken/veggies/lasagna)     Dinner:  Lean cuisine      Snacks:  Adam crackers  Carrots   Popcorn      Beverages:  Water     BMR: 1439      Review of Systems  I personally reviewed Past Medical History, Past Surgical history,  Past Social History and Family History      Objective:   /80 (BP Location: Left arm, Patient Position: Sitting)   Pulse 83   Ht 5' 3" (1.6 m)   Wt 79.7 kg (175 lb 11.3 oz)   SpO2 98%   BMI 31.13 kg/m²     Physical Exam   Constitutional: She is oriented to person, place, and time. She appears well-developed and well-nourished. No distress.   HENT:   Head: Normocephalic and atraumatic.   Right Ear: Hearing, tympanic membrane, external ear and ear canal normal.   Left Ear: Hearing, tympanic membrane, external ear and ear canal normal.   Nose: Nose normal.   Mouth/Throat: Uvula is midline and oropharynx is clear and moist. No oropharyngeal exudate.   Eyes: Pupils are equal, round, and reactive to light. Conjunctivae and EOM are normal. Right eye exhibits no discharge. Left eye exhibits no discharge. No scleral icterus.   Neck: Normal range of motion. Neck supple.   Cardiovascular: Normal rate, regular rhythm, normal heart sounds and intact distal pulses. Exam reveals no gallop.   No murmur heard.  Pulmonary/Chest: " Effort normal and breath sounds normal. No respiratory distress. She has no wheezes. She has no rales. She exhibits no tenderness.   Abdominal: Soft. Bowel sounds are normal. She exhibits no distension and no mass. There is no tenderness. There is no rebound and no guarding.   Neurological: She is alert and oriented to person, place, and time.   Skin: Skin is warm and dry.   Vitals reviewed.      1. Constipation, unspecified constipation type    2. Obesity, unspecified classification, unspecified obesity type, unspecified whether serious comorbidity present      1. Increase water intake, cont fiber, miralax prn call if no improvement   2. Reviewed increasing water intake, change eating habits, start food journal and start calorie counting, switch to high protein snacks  Stop metformin, start diethlproprion   Patient warned of common side effects of diethylpropion including anxiety, insomnia, palpitations and increased blood pressure. It was also explained that it is for short-term usage along with diet and exercise, and that stopping the medication without making lifestyle changes will result in regain of weight. Patient states understanding.    Weight loss medications are controlled substances.  They require routine follow up. Prescription or pills that are lost or destroyed will not be replaced.     Return in 4 weeks for weight and bp check       Orders Placed This Encounter   Procedures    Ambulatory consult to Nutrition Services     Medications Ordered This Encounter   Medications    diethylpropion 75 mg TbSR     Sig: Take 1 tablet (75 mg) by mouth once daily.     Dispense:  30 tablet     Refill:  0    polyethylene glycol (GLYCOLAX) 17 gram/dose powder     Sig: Mix 1 capful (17 g) and take by mouth daily as needed.     Dispense:  510 g     Refill:  2

## 2019-05-30 ENCOUNTER — TELEPHONE (OUTPATIENT)
Dept: INTERNAL MEDICINE | Facility: CLINIC | Age: 44
End: 2019-05-30

## 2019-09-11 ENCOUNTER — PATIENT MESSAGE (OUTPATIENT)
Dept: INTERNAL MEDICINE | Facility: CLINIC | Age: 44
End: 2019-09-11

## 2019-09-11 DIAGNOSIS — E66.9 OBESITY, UNSPECIFIED CLASSIFICATION, UNSPECIFIED OBESITY TYPE, UNSPECIFIED WHETHER SERIOUS COMORBIDITY PRESENT: Primary | ICD-10-CM

## 2019-09-11 RX ORDER — DIETHYLPROPION HYDROCHLORIDE 75 MG/1
75 TABLET, EXTENDED RELEASE ORAL DAILY
Qty: 30 TABLET | Refills: 0 | OUTPATIENT
Start: 2019-09-11

## 2019-10-10 ENCOUNTER — OFFICE VISIT (OUTPATIENT)
Dept: INTERNAL MEDICINE | Facility: CLINIC | Age: 44
End: 2019-10-10
Attending: FAMILY MEDICINE
Payer: COMMERCIAL

## 2019-10-10 VITALS
WEIGHT: 167.31 LBS | BODY MASS INDEX: 29.64 KG/M2 | HEART RATE: 82 BPM | DIASTOLIC BLOOD PRESSURE: 78 MMHG | HEIGHT: 63 IN | SYSTOLIC BLOOD PRESSURE: 116 MMHG | OXYGEN SATURATION: 99 %

## 2019-10-10 DIAGNOSIS — E66.9 OBESITY WITH SERIOUS COMORBIDITY, UNSPECIFIED CLASSIFICATION, UNSPECIFIED OBESITY TYPE: ICD-10-CM

## 2019-10-10 DIAGNOSIS — K59.00 CONSTIPATION, UNSPECIFIED CONSTIPATION TYPE: Primary | ICD-10-CM

## 2019-10-10 PROCEDURE — 99999 PR PBB SHADOW E&M-EST. PATIENT-LVL III: CPT | Mod: PBBFAC,,, | Performed by: FAMILY MEDICINE

## 2019-10-10 PROCEDURE — 99214 OFFICE O/P EST MOD 30 MIN: CPT | Mod: S$GLB,,, | Performed by: FAMILY MEDICINE

## 2019-10-10 PROCEDURE — 3008F PR BODY MASS INDEX (BMI) DOCUMENTED: ICD-10-PCS | Mod: CPTII,S$GLB,, | Performed by: FAMILY MEDICINE

## 2019-10-10 PROCEDURE — 3008F BODY MASS INDEX DOCD: CPT | Mod: CPTII,S$GLB,, | Performed by: FAMILY MEDICINE

## 2019-10-10 PROCEDURE — 99214 PR OFFICE/OUTPT VISIT, EST, LEVL IV, 30-39 MIN: ICD-10-PCS | Mod: S$GLB,,, | Performed by: FAMILY MEDICINE

## 2019-10-10 PROCEDURE — 99999 PR PBB SHADOW E&M-EST. PATIENT-LVL III: ICD-10-PCS | Mod: PBBFAC,,, | Performed by: FAMILY MEDICINE

## 2019-10-10 RX ORDER — DIETHYLPROPION HYDROCHLORIDE 75 MG/1
75 TABLET, EXTENDED RELEASE ORAL DAILY
Qty: 30 TABLET | Refills: 1 | Status: SHIPPED | OUTPATIENT
Start: 2019-10-10 | End: 2021-07-23

## 2019-10-10 RX ORDER — DIETHYLPROPION HYDROCHLORIDE 75 MG/1
75 TABLET, EXTENDED RELEASE ORAL DAILY
Qty: 30 TABLET | Refills: 1 | Status: SHIPPED | OUTPATIENT
Start: 2019-10-10 | End: 2019-10-10 | Stop reason: SDUPTHER

## 2019-10-10 RX ORDER — IBUPROFEN 800 MG/1
800 TABLET ORAL 3 TIMES DAILY PRN
Qty: 90 TABLET | Refills: 1 | Status: SHIPPED | OUTPATIENT
Start: 2019-10-10 | End: 2020-02-10

## 2019-10-10 NOTE — PROGRESS NOTES
"Subjective:      Patient ID: Nora Conklin is a 44 y.o. female.    Chief Complaint: Follow-up    HPI   Patient here today for weight loss follow up. She jogs 30 minutes daily 5 days a week. She drinks 4-5 16.9 fl oz water daily. She has been food journaling. She reports the diethylpropion it did help with appetite suppression. She denies any anxiety, insomnia or heart racing with it.       Breakfast  2 boiled eggs/sausage  Oatmeal   Muffin/fruit     Snack  Popcorn    Lunch   Frozen dinner (michelinas)  Salad/grilled chicken     Dinner   Frozen dinner       Review of Systems   Constitutional: Negative for activity change, appetite change, chills, diaphoresis, fatigue, fever and unexpected weight change.   HENT: Negative for congestion, ear discharge, ear pain, hearing loss, postnasal drip, rhinorrhea, sinus pressure and sore throat.    Respiratory: Negative for cough, shortness of breath and wheezing.    Cardiovascular: Negative for chest pain.   Gastrointestinal: Negative for abdominal pain, constipation, diarrhea, nausea and vomiting.   Genitourinary: Negative for dysuria and frequency.   Musculoskeletal: Negative.    Psychiatric/Behavioral: Negative for suicidal ideas.     I personally reviewed Past Medical History, Past Surgical history,  Past Social History and Family History      Objective:   /78 (BP Location: Left arm, Patient Position: Sitting)   Pulse 82   Ht 5' 3" (1.6 m)   Wt 75.9 kg (167 lb 5.3 oz)   SpO2 99%   BMI 29.64 kg/m²     Physical Exam   Constitutional: She is oriented to person, place, and time. She appears well-developed and well-nourished. No distress.   HENT:   Head: Normocephalic and atraumatic.   Right Ear: Hearing, tympanic membrane, external ear and ear canal normal.   Left Ear: Hearing, tympanic membrane, external ear and ear canal normal.   Nose: Nose normal.   Mouth/Throat: Uvula is midline and oropharynx is clear and moist. No oropharyngeal exudate.   Eyes: Pupils are " equal, round, and reactive to light. Conjunctivae and EOM are normal. Right eye exhibits no discharge. Left eye exhibits no discharge. No scleral icterus.   Neck: Normal range of motion. Neck supple.   Cardiovascular: Normal rate, regular rhythm, normal heart sounds and intact distal pulses. Exam reveals no gallop.   No murmur heard.  Pulmonary/Chest: Effort normal and breath sounds normal. No respiratory distress. She has no wheezes. She has no rales. She exhibits no tenderness.   Abdominal: Soft. Bowel sounds are normal. She exhibits no distension and no mass. There is no tenderness. There is no rebound and no guarding.   Neurological: She is alert and oriented to person, place, and time.   Skin: Skin is warm and dry.   Vitals reviewed.      1. Constipation, unspecified constipation type        1. stable, cont miralax prn   2. 8 pound weight loss in 5 months, cont diethylproprion  -increase protein content in food   -weight/bp check q 4 weeks      Patient warned of common side effects of diethylpropion including anxiety, insomnia, palpitations and increased blood pressure. It was also explained that it is for short-term usage along with diet and exercise, and that stopping the medication without making lifestyle changes will result in regain of weight. Patient states understanding.    Weight loss medications are controlled substances.  They require routine follow up. Prescription or pills that are lost or destroyed will not be replaced.           No orders of the defined types were placed in this encounter.    Medications Ordered This Encounter   Medications    diethylpropion 75 mg TbSR     Sig: Take 1 tablet (75 mg) by mouth once daily.     Dispense:  30 tablet     Refill:  1

## 2019-11-07 ENCOUNTER — CLINICAL SUPPORT (OUTPATIENT)
Dept: INTERNAL MEDICINE | Facility: CLINIC | Age: 44
End: 2019-11-07
Payer: COMMERCIAL

## 2019-11-07 ENCOUNTER — TELEPHONE (OUTPATIENT)
Dept: INTERNAL MEDICINE | Facility: CLINIC | Age: 44
End: 2019-11-07

## 2019-11-07 VITALS
SYSTOLIC BLOOD PRESSURE: 116 MMHG | DIASTOLIC BLOOD PRESSURE: 70 MMHG | HEART RATE: 92 BPM | BODY MASS INDEX: 30.23 KG/M2 | WEIGHT: 170.63 LBS | OXYGEN SATURATION: 100 %

## 2019-11-07 PROCEDURE — 99999 PR PBB SHADOW E&M-EST. PATIENT-LVL II: ICD-10-PCS | Mod: PBBFAC,,,

## 2019-11-07 PROCEDURE — 99999 PR PBB SHADOW E&M-EST. PATIENT-LVL II: CPT | Mod: PBBFAC,,,

## 2019-11-07 NOTE — TELEPHONE ENCOUNTER
Last office visit: 10/10/19  - Weight as of last office visit: 75.9 kg (167 lb)  Current weight: 77.4 kg (170 lb)  Patient adhering to diet plan: yes  Patient requesting refill of medication: no  Does patient have record of home blood pressure readings no. Readings have been averaging unknown.   Patient not taking blood pressure medication.  Patient is asymptomatic.   Complains of constipation, states miralax doesn't help.  BP: 116/70 , Pulse: 92.

## 2019-11-07 NOTE — Clinical Note
Does patient have record of home blood pressure readings no. Readings have been averaging unknown. Patient not taking blood pressure medication.Last office visit: 10/10/19  - Weight as of last office visit: 75.9 kg (167 lb)Current weight: 77.4 kg (170 lb)Patient adhering to diet plan: yesPatient requesting refill of medication: noPatient is asymptomatic. Complains of constipation, states miralax doesn't help.BP: 116/70 , Pulse: 92.

## 2019-11-07 NOTE — PROGRESS NOTES
Patient reported to clinic today for weight check.    Last office visit: 10/10/19  - Weight as of last office visit: 75.9 kg (167 lb)    Current weight: 77.4 kg (170 lb)    Patient adhering to diet plan: yes  Patient requesting refill of medication: victor m    Nora Conklin 44 y.o. female is here today for Blood Pressure check.   History of HTN no.    Review of patient's allergies indicates:  No Known Allergies  Creatinine   Date Value Ref Range Status   05/01/2019 0.8 0.5 - 1.4 mg/dL Final     Sodium   Date Value Ref Range Status   05/01/2019 135 (L) 136 - 145 mmol/L Final     Potassium   Date Value Ref Range Status   05/01/2019 3.6 3.5 - 5.1 mmol/L Final   ]  Patient denies taking blood pressure medications on a regular bases at the same time of the day.     Current Outpatient Medications:     amitriptyline (ELAVIL) 10 MG tablet, Take 2 tablets by mouth every night around dinner time., Disp: 180 tablet, Rfl: 3    ASPIRIN/ACETAMINOPHEN/CAFFEINE (GOODY'S MIGRAINE RELIEF ORAL), Take by mouth., Disp: , Rfl:     diethylpropion 75 mg TbSR, Take 1 tablet (75 mg) by mouth once daily., Disp: 30 tablet, Rfl: 1    ergocalciferol (ERGOCALCIFEROL) 50,000 unit Cap, Take 1 capsule (50,000 Units total) by mouth every 7 days. (Patient not taking: Reported on 10/10/2019), Disp: 12 capsule, Rfl: 0    ibuprofen (ADVIL,MOTRIN) 800 MG tablet, Take 1 tablet (800 mg total) by mouth 3 (three) times daily as needed for Pain., Disp: 90 tablet, Rfl: 1    iron,carbon,gluc-FA-B12-C-dss (FERRALET 90 DUAL-IRON DELIVERY) 90-1-12-50 mg-mg-mcg-mg Tab, Take 1 tablet by mouth once daily. (Patient not taking: Reported on 10/10/2019), Disp: 90 tablet, Rfl: 3    polyethylene glycol (GLYCOLAX) 17 gram/dose powder, Mix 1 capful (17 g) in liquid and take by mouth daily as needed., Disp: 510 g, Rfl: 2    sumatriptan (IMITREX) 100 MG tablet, Onset migraine, 1 tab, second 2 hours later, no more 2 tabs day/3 days use in week, Disp: 12 tablet, Rfl:  3  Does patient have record of home blood pressure readings no. Readings have been averaging unknown.   Patient not taking blood pressure medication.  Patient is asymptomatic.   Complains of constipation, states miralax doesn't help.  BP: 116/70 , Pulse: 92.  Dr. Miller notified.

## 2019-11-27 ENCOUNTER — TELEPHONE (OUTPATIENT)
Dept: INTERNAL MEDICINE | Facility: CLINIC | Age: 44
End: 2019-11-27

## 2019-12-06 ENCOUNTER — LAB VISIT (OUTPATIENT)
Dept: LAB | Facility: HOSPITAL | Age: 44
End: 2019-12-06
Attending: INTERNAL MEDICINE
Payer: COMMERCIAL

## 2019-12-06 ENCOUNTER — TELEPHONE (OUTPATIENT)
Dept: INTERNAL MEDICINE | Facility: CLINIC | Age: 44
End: 2019-12-06

## 2019-12-06 DIAGNOSIS — N39.0 URINARY TRACT INFECTION WITHOUT HEMATURIA, SITE UNSPECIFIED: ICD-10-CM

## 2019-12-06 DIAGNOSIS — N39.0 URINARY TRACT INFECTION WITHOUT HEMATURIA, SITE UNSPECIFIED: Primary | ICD-10-CM

## 2019-12-06 LAB
AMORPH CRY UR QL COMP ASSIST: ABNORMAL
BACTERIA #/AREA URNS AUTO: ABNORMAL /HPF
BILIRUB UR QL STRIP: NEGATIVE
CLARITY UR REFRACT.AUTO: ABNORMAL
COLOR UR AUTO: YELLOW
GLUCOSE UR QL STRIP: NEGATIVE
HGB UR QL STRIP: ABNORMAL
KETONES UR QL STRIP: NEGATIVE
LEUKOCYTE ESTERASE UR QL STRIP: ABNORMAL
MICROSCOPIC COMMENT: ABNORMAL
NITRITE UR QL STRIP: POSITIVE
PH UR STRIP: 6 [PH] (ref 5–8)
PROT UR QL STRIP: NEGATIVE
RBC #/AREA URNS AUTO: 5 /HPF (ref 0–4)
SP GR UR STRIP: 1.02 (ref 1–1.03)
SQUAMOUS #/AREA URNS AUTO: 3 /HPF
URN SPEC COLLECT METH UR: ABNORMAL
WBC #/AREA URNS AUTO: 8 /HPF (ref 0–5)

## 2019-12-06 PROCEDURE — 87077 CULTURE AEROBIC IDENTIFY: CPT

## 2019-12-06 PROCEDURE — 81001 URINALYSIS AUTO W/SCOPE: CPT

## 2019-12-06 PROCEDURE — 87086 URINE CULTURE/COLONY COUNT: CPT

## 2019-12-06 PROCEDURE — 87186 SC STD MICRODIL/AGAR DIL: CPT

## 2019-12-06 PROCEDURE — 87088 URINE BACTERIA CULTURE: CPT

## 2019-12-06 RX ORDER — SULFAMETHOXAZOLE AND TRIMETHOPRIM 800; 160 MG/1; MG/1
1 TABLET ORAL 2 TIMES DAILY
Qty: 14 TABLET | Refills: 0 | Status: SHIPPED | OUTPATIENT
Start: 2019-12-06 | End: 2019-12-13

## 2019-12-09 LAB — BACTERIA UR CULT: ABNORMAL

## 2020-02-10 RX ORDER — IBUPROFEN 800 MG/1
800 TABLET ORAL 3 TIMES DAILY PRN
Qty: 90 TABLET | Refills: 1 | Status: SHIPPED | OUTPATIENT
Start: 2020-02-10 | End: 2021-12-31 | Stop reason: SDUPTHER

## 2020-03-03 ENCOUNTER — LAB VISIT (OUTPATIENT)
Dept: LAB | Facility: HOSPITAL | Age: 45
End: 2020-03-03
Attending: INTERNAL MEDICINE
Payer: COMMERCIAL

## 2020-03-03 DIAGNOSIS — R42 LIGHTHEADEDNESS: Primary | ICD-10-CM

## 2020-03-03 DIAGNOSIS — R42 LIGHTHEADEDNESS: ICD-10-CM

## 2020-03-03 LAB
BASOPHILS # BLD AUTO: 0.04 K/UL (ref 0–0.2)
BASOPHILS NFR BLD: 0.7 % (ref 0–1.9)
DIFFERENTIAL METHOD: ABNORMAL
EOSINOPHIL # BLD AUTO: 0.2 K/UL (ref 0–0.5)
EOSINOPHIL NFR BLD: 2.9 % (ref 0–8)
ERYTHROCYTE [DISTWIDTH] IN BLOOD BY AUTOMATED COUNT: 12.5 % (ref 11.5–14.5)
HCT VFR BLD AUTO: 39.4 % (ref 37–48.5)
HGB BLD-MCNC: 12.3 G/DL (ref 12–16)
IMM GRANULOCYTES # BLD AUTO: 0.02 K/UL (ref 0–0.04)
IMM GRANULOCYTES NFR BLD AUTO: 0.3 % (ref 0–0.5)
LYMPHOCYTES # BLD AUTO: 2.1 K/UL (ref 1–4.8)
LYMPHOCYTES NFR BLD: 35.3 % (ref 18–48)
MCH RBC QN AUTO: 29.6 PG (ref 27–31)
MCHC RBC AUTO-ENTMCNC: 31.2 G/DL (ref 32–36)
MCV RBC AUTO: 95 FL (ref 82–98)
MONOCYTES # BLD AUTO: 0.6 K/UL (ref 0.3–1)
MONOCYTES NFR BLD: 9.3 % (ref 4–15)
NEUTROPHILS # BLD AUTO: 3 K/UL (ref 1.8–7.7)
NEUTROPHILS NFR BLD: 51.5 % (ref 38–73)
NRBC BLD-RTO: 0 /100 WBC
PLATELET # BLD AUTO: 288 K/UL (ref 150–350)
PMV BLD AUTO: 10.1 FL (ref 9.2–12.9)
RBC # BLD AUTO: 4.15 M/UL (ref 4–5.4)
WBC # BLD AUTO: 5.89 K/UL (ref 3.9–12.7)

## 2020-03-03 PROCEDURE — 85025 COMPLETE CBC W/AUTO DIFF WBC: CPT

## 2020-03-03 PROCEDURE — 80053 COMPREHEN METABOLIC PANEL: CPT

## 2020-03-03 PROCEDURE — 36415 COLL VENOUS BLD VENIPUNCTURE: CPT

## 2020-03-03 PROCEDURE — 84443 ASSAY THYROID STIM HORMONE: CPT

## 2020-03-03 NOTE — PROGRESS NOTES
Pt c episodes of intermittent lightheadedness since Sunday night.  No LOC.  No CP or palpitations.  LMP around 2/7.  Reports not pregnant.  Admits to often not drinking enough water at times.  Will check basic labs.  Also advised to take women's MVI since previous B12 and Vit D low in the past but may need ergocalciferol; will defer to PCP.

## 2020-03-04 LAB
ALBUMIN SERPL BCP-MCNC: 3.4 G/DL (ref 3.5–5.2)
ALP SERPL-CCNC: 64 U/L (ref 55–135)
ALT SERPL W/O P-5'-P-CCNC: 12 U/L (ref 10–44)
ANION GAP SERPL CALC-SCNC: 7 MMOL/L (ref 8–16)
AST SERPL-CCNC: 14 U/L (ref 10–40)
BILIRUB SERPL-MCNC: 0.6 MG/DL (ref 0.1–1)
BUN SERPL-MCNC: 14 MG/DL (ref 6–20)
CALCIUM SERPL-MCNC: 9 MG/DL (ref 8.7–10.5)
CHLORIDE SERPL-SCNC: 107 MMOL/L (ref 95–110)
CO2 SERPL-SCNC: 27 MMOL/L (ref 23–29)
CREAT SERPL-MCNC: 0.8 MG/DL (ref 0.5–1.4)
EST. GFR  (AFRICAN AMERICAN): >60 ML/MIN/1.73 M^2
EST. GFR  (NON AFRICAN AMERICAN): >60 ML/MIN/1.73 M^2
GLUCOSE SERPL-MCNC: 90 MG/DL (ref 70–110)
POTASSIUM SERPL-SCNC: 3.7 MMOL/L (ref 3.5–5.1)
PROT SERPL-MCNC: 7.5 G/DL (ref 6–8.4)
SODIUM SERPL-SCNC: 141 MMOL/L (ref 136–145)
TSH SERPL DL<=0.005 MIU/L-ACNC: 1.22 UIU/ML (ref 0.4–4)

## 2020-04-21 DIAGNOSIS — Z01.84 ANTIBODY RESPONSE EXAMINATION: ICD-10-CM

## 2020-05-01 ENCOUNTER — LAB VISIT (OUTPATIENT)
Dept: LAB | Facility: HOSPITAL | Age: 45
End: 2020-05-01
Attending: INTERNAL MEDICINE
Payer: COMMERCIAL

## 2020-05-01 DIAGNOSIS — Z01.84 ANTIBODY RESPONSE EXAMINATION: ICD-10-CM

## 2020-05-01 LAB — SARS-COV-2 IGG SERPLBLD QL IA.RAPID: NEGATIVE

## 2020-05-01 PROCEDURE — 86769 SARS-COV-2 COVID-19 ANTIBODY: CPT

## 2020-05-01 PROCEDURE — 36415 COLL VENOUS BLD VENIPUNCTURE: CPT

## 2020-07-13 ENCOUNTER — TELEPHONE (OUTPATIENT)
Dept: INTERNAL MEDICINE | Facility: CLINIC | Age: 45
End: 2020-07-13

## 2020-07-13 DIAGNOSIS — Z00.00 WELLNESS EXAMINATION: Primary | ICD-10-CM

## 2020-07-25 ENCOUNTER — LAB VISIT (OUTPATIENT)
Dept: LAB | Facility: HOSPITAL | Age: 45
End: 2020-07-25
Attending: INTERNAL MEDICINE
Payer: COMMERCIAL

## 2020-07-25 DIAGNOSIS — Z00.00 WELLNESS EXAMINATION: ICD-10-CM

## 2020-07-25 LAB
25(OH)D3+25(OH)D2 SERPL-MCNC: 12 NG/ML (ref 30–96)
ALBUMIN SERPL BCP-MCNC: 3.6 G/DL (ref 3.5–5.2)
ALP SERPL-CCNC: 56 U/L (ref 55–135)
ALT SERPL W/O P-5'-P-CCNC: 10 U/L (ref 10–44)
ANION GAP SERPL CALC-SCNC: 5 MMOL/L (ref 8–16)
AST SERPL-CCNC: 15 U/L (ref 10–40)
BASOPHILS # BLD AUTO: 0.03 K/UL (ref 0–0.2)
BASOPHILS NFR BLD: 0.7 % (ref 0–1.9)
BILIRUB SERPL-MCNC: 0.5 MG/DL (ref 0.1–1)
BUN SERPL-MCNC: 16 MG/DL (ref 6–20)
CALCIUM SERPL-MCNC: 8.5 MG/DL (ref 8.7–10.5)
CHLORIDE SERPL-SCNC: 107 MMOL/L (ref 95–110)
CHOLEST SERPL-MCNC: 192 MG/DL (ref 120–199)
CHOLEST/HDLC SERPL: 3.4 {RATIO} (ref 2–5)
CO2 SERPL-SCNC: 25 MMOL/L (ref 23–29)
CREAT SERPL-MCNC: 0.8 MG/DL (ref 0.5–1.4)
CRP SERPL-MCNC: 1.1 MG/L (ref 0–8.2)
DIFFERENTIAL METHOD: ABNORMAL
EOSINOPHIL # BLD AUTO: 0.1 K/UL (ref 0–0.5)
EOSINOPHIL NFR BLD: 3 % (ref 0–8)
ERYTHROCYTE [DISTWIDTH] IN BLOOD BY AUTOMATED COUNT: 12.5 % (ref 11.5–14.5)
ERYTHROCYTE [SEDIMENTATION RATE] IN BLOOD BY WESTERGREN METHOD: 17 MM/HR (ref 0–36)
EST. GFR  (AFRICAN AMERICAN): >60 ML/MIN/1.73 M^2
EST. GFR  (NON AFRICAN AMERICAN): >60 ML/MIN/1.73 M^2
ESTIMATED AVG GLUCOSE: 105 MG/DL (ref 68–131)
GLUCOSE SERPL-MCNC: 84 MG/DL (ref 70–110)
HBA1C MFR BLD HPLC: 5.3 % (ref 4–5.6)
HCT VFR BLD AUTO: 35.7 % (ref 37–48.5)
HDLC SERPL-MCNC: 57 MG/DL (ref 40–75)
HDLC SERPL: 29.7 % (ref 20–50)
HGB BLD-MCNC: 11.1 G/DL (ref 12–16)
IMM GRANULOCYTES # BLD AUTO: 0.01 K/UL (ref 0–0.04)
IMM GRANULOCYTES NFR BLD AUTO: 0.2 % (ref 0–0.5)
LDLC SERPL CALC-MCNC: 125.4 MG/DL (ref 63–159)
LYMPHOCYTES # BLD AUTO: 1.7 K/UL (ref 1–4.8)
LYMPHOCYTES NFR BLD: 39.3 % (ref 18–48)
MCH RBC QN AUTO: 29.8 PG (ref 27–31)
MCHC RBC AUTO-ENTMCNC: 31.1 G/DL (ref 32–36)
MCV RBC AUTO: 96 FL (ref 82–98)
MONOCYTES # BLD AUTO: 0.4 K/UL (ref 0.3–1)
MONOCYTES NFR BLD: 9.8 % (ref 4–15)
NEUTROPHILS # BLD AUTO: 2.1 K/UL (ref 1.8–7.7)
NEUTROPHILS NFR BLD: 47 % (ref 38–73)
NONHDLC SERPL-MCNC: 135 MG/DL
NRBC BLD-RTO: 0 /100 WBC
PLATELET # BLD AUTO: 237 K/UL (ref 150–350)
PMV BLD AUTO: 10.1 FL (ref 9.2–12.9)
POTASSIUM SERPL-SCNC: 3.6 MMOL/L (ref 3.5–5.1)
PROT SERPL-MCNC: 7.1 G/DL (ref 6–8.4)
RBC # BLD AUTO: 3.73 M/UL (ref 4–5.4)
SODIUM SERPL-SCNC: 137 MMOL/L (ref 136–145)
TRIGL SERPL-MCNC: 48 MG/DL (ref 30–150)
VIT B12 SERPL-MCNC: 250 PG/ML (ref 210–950)
WBC # BLD AUTO: 4.38 K/UL (ref 3.9–12.7)

## 2020-07-25 PROCEDURE — 83921 ORGANIC ACID SINGLE QUANT: CPT

## 2020-07-25 PROCEDURE — 82607 VITAMIN B-12: CPT

## 2020-07-25 PROCEDURE — 86140 C-REACTIVE PROTEIN: CPT

## 2020-07-25 PROCEDURE — 85025 COMPLETE CBC W/AUTO DIFF WBC: CPT

## 2020-07-25 PROCEDURE — 85652 RBC SED RATE AUTOMATED: CPT

## 2020-07-25 PROCEDURE — 83036 HEMOGLOBIN GLYCOSYLATED A1C: CPT

## 2020-07-25 PROCEDURE — 80053 COMPREHEN METABOLIC PANEL: CPT

## 2020-07-25 PROCEDURE — 36415 COLL VENOUS BLD VENIPUNCTURE: CPT | Mod: PO

## 2020-07-25 PROCEDURE — 80061 LIPID PANEL: CPT

## 2020-07-25 PROCEDURE — 82306 VITAMIN D 25 HYDROXY: CPT

## 2020-07-27 ENCOUNTER — OFFICE VISIT (OUTPATIENT)
Dept: INTERNAL MEDICINE | Facility: CLINIC | Age: 45
End: 2020-07-27
Payer: COMMERCIAL

## 2020-07-27 VITALS
WEIGHT: 166.88 LBS | DIASTOLIC BLOOD PRESSURE: 80 MMHG | SYSTOLIC BLOOD PRESSURE: 118 MMHG | TEMPERATURE: 98 F | HEART RATE: 85 BPM | HEIGHT: 63 IN | OXYGEN SATURATION: 99 % | BODY MASS INDEX: 29.57 KG/M2

## 2020-07-27 DIAGNOSIS — K59.00 CONSTIPATION, UNSPECIFIED CONSTIPATION TYPE: ICD-10-CM

## 2020-07-27 DIAGNOSIS — M54.9 BACK PAIN, UNSPECIFIED BACK LOCATION, UNSPECIFIED BACK PAIN LATERALITY, UNSPECIFIED CHRONICITY: ICD-10-CM

## 2020-07-27 DIAGNOSIS — Z01.419 NORMAL GYNECOLOGIC EXAMINATION: ICD-10-CM

## 2020-07-27 DIAGNOSIS — Z12.31 SCREENING MAMMOGRAM, ENCOUNTER FOR: Primary | ICD-10-CM

## 2020-07-27 LAB
BACTERIA #/AREA URNS AUTO: NORMAL /HPF
BILIRUB UR QL STRIP: NEGATIVE
CLARITY UR REFRACT.AUTO: ABNORMAL
COLOR UR AUTO: YELLOW
GLUCOSE UR QL STRIP: NEGATIVE
HGB UR QL STRIP: ABNORMAL
KETONES UR QL STRIP: NEGATIVE
LEUKOCYTE ESTERASE UR QL STRIP: NEGATIVE
MICROSCOPIC COMMENT: NORMAL
NITRITE UR QL STRIP: NEGATIVE
PH UR STRIP: 6 [PH] (ref 5–8)
PROT UR QL STRIP: NEGATIVE
RBC #/AREA URNS AUTO: 4 /HPF (ref 0–4)
SP GR UR STRIP: 1.02 (ref 1–1.03)
SQUAMOUS #/AREA URNS AUTO: 5 /HPF
URN SPEC COLLECT METH UR: ABNORMAL
WBC #/AREA URNS AUTO: 2 /HPF (ref 0–5)

## 2020-07-27 PROCEDURE — 3008F BODY MASS INDEX DOCD: CPT | Mod: CPTII,S$GLB,, | Performed by: INTERNAL MEDICINE

## 2020-07-27 PROCEDURE — 99999 PR PBB SHADOW E&M-EST. PATIENT-LVL V: CPT | Mod: PBBFAC,,, | Performed by: INTERNAL MEDICINE

## 2020-07-27 PROCEDURE — 99999 PR PBB SHADOW E&M-EST. PATIENT-LVL V: ICD-10-PCS | Mod: PBBFAC,,, | Performed by: INTERNAL MEDICINE

## 2020-07-27 PROCEDURE — 99396 PR PREVENTIVE VISIT,EST,40-64: ICD-10-PCS | Mod: S$GLB,,, | Performed by: INTERNAL MEDICINE

## 2020-07-27 PROCEDURE — 3008F PR BODY MASS INDEX (BMI) DOCUMENTED: ICD-10-PCS | Mod: CPTII,S$GLB,, | Performed by: INTERNAL MEDICINE

## 2020-07-27 PROCEDURE — 81001 URINALYSIS AUTO W/SCOPE: CPT

## 2020-07-27 PROCEDURE — 87086 URINE CULTURE/COLONY COUNT: CPT

## 2020-07-27 PROCEDURE — 99396 PREV VISIT EST AGE 40-64: CPT | Mod: S$GLB,,, | Performed by: INTERNAL MEDICINE

## 2020-07-27 RX ORDER — MELOXICAM 15 MG/1
15 TABLET ORAL DAILY
Qty: 20 TABLET | Refills: 0 | Status: SHIPPED | OUTPATIENT
Start: 2020-07-27 | End: 2021-04-06 | Stop reason: SDUPTHER

## 2020-07-27 RX ORDER — ERGOCALCIFEROL 1.25 MG/1
CAPSULE ORAL
Qty: 12 CAPSULE | Refills: 0 | Status: SHIPPED | OUTPATIENT
Start: 2020-07-27 | End: 2022-05-10 | Stop reason: CLARIF

## 2020-07-27 RX ORDER — CYCLOBENZAPRINE HCL 5 MG
TABLET ORAL
Qty: 20 TABLET | Refills: 0 | Status: SHIPPED | OUTPATIENT
Start: 2020-07-27 | End: 2021-06-10 | Stop reason: ALTCHOICE

## 2020-07-27 RX ORDER — AMITRIPTYLINE HYDROCHLORIDE 10 MG/1
TABLET, FILM COATED ORAL
Qty: 180 TABLET | Refills: 3 | Status: SHIPPED | OUTPATIENT
Start: 2020-07-27 | End: 2022-05-01 | Stop reason: SDUPTHER

## 2020-07-27 NOTE — PROGRESS NOTES
Subjective:       Patient ID: Nora Conklin is a 45 y.o. female.    Chief Complaint: Establish Care    HPIPT is new to me - she has been having several months of R hip pain.  No numbness or weakness, no bowel or bladder changes.  Has some chronic constipation.  Review of Systems   Respiratory: Negative for shortness of breath (PND or orthopnea).    Cardiovascular: Negative for chest pain (arm pain or jaw pain).   Gastrointestinal: Negative for abdominal pain, diarrhea, nausea and vomiting.   Genitourinary: Negative for dysuria.   Neurological: Negative for seizures, syncope and headaches.       Objective:      Physical Exam  Constitutional:       General: She is not in acute distress.     Appearance: She is well-developed.   HENT:      Head: Normocephalic.   Eyes:      Pupils: Pupils are equal, round, and reactive to light.   Neck:      Musculoskeletal: Neck supple.      Thyroid: No thyromegaly.      Vascular: No JVD.   Cardiovascular:      Rate and Rhythm: Normal rate and regular rhythm.      Heart sounds: Normal heart sounds. No murmur. No friction rub. No gallop.    Pulmonary:      Effort: Pulmonary effort is normal.      Breath sounds: Normal breath sounds. No wheezing or rales.   Abdominal:      General: Bowel sounds are normal. There is no distension.      Palpations: Abdomen is soft. There is no mass.      Tenderness: There is no abdominal tenderness. There is no guarding or rebound.   Musculoskeletal:      Comments: 5/5 strength in BLE, 2+reflexes at knees and ankles bilaterally, neg SLR bilaterally, easily walks on heels and toes  Nontender over her R trochanteric   Lymphadenopathy:      Cervical: No cervical adenopathy.   Skin:     General: Skin is warm and dry.   Neurological:      Mental Status: She is alert and oriented to person, place, and time.      Deep Tendon Reflexes: Reflexes are normal and symmetric.   Psychiatric:         Behavior: Behavior normal.         Thought Content: Thought content  normal.         Judgment: Judgment normal.         Assessment:       1. Screening mammogram, encounter for    2. Normal gynecologic examination    3. Constipation, unspecified constipation type    4. Back pain, unspecified back location, unspecified back pain laterality, unspecified chronicity        Plan:   Screening mammogram, encounter for  -     Mammo Digital Screening Bilat w/ [facility]; Future; Expected date: 01/27/2021    Normal gynecologic examination  -     Ambulatory referral/consult to Gynecology; Future; Expected date: 08/03/2020    Constipation, unspecified constipation type  -     Ambulatory referral/consult to Gastroenterology; Future; Expected date: 08/03/2020    Back pain, unspecified back location, unspecified back pain laterality, unspecified chronicity  -     Urinalysis  -     Urine culture    Other orders  -     ergocalciferol (ERGOCALCIFEROL) 50,000 unit Cap; One weekly for 12 weeks then 2,000 IU daily thereafter  Dispense: 12 capsule; Refill: 0  -     amitriptyline (ELAVIL) 10 MG tablet; Take 2 tablets by mouth every night around dinner time.  Dispense: 180 tablet; Refill: 3  R hip pain  -     meloxicam (MOBIC) 15 MG tablet; Take 1 tablet (15 mg total) by mouth once daily.  Dispense: 20 tablet; Refill: 0  -     cyclobenzaprine (FLEXERIL) 5 MG tablet; One - two at bedtime as needed for pain  Dispense: 20 tablet; Refill: 0  -     Urinalysis Microscopic  If no improvement then MRI

## 2020-07-28 LAB — BACTERIA UR CULT: NO GROWTH

## 2020-07-31 LAB — METHYLMALONATE SERPL-SCNC: 0.14 UMOL/L

## 2020-08-13 PROBLEM — G43.909 MIGRAINE WITHOUT STATUS MIGRAINOSUS, NOT INTRACTABLE: Status: ACTIVE | Noted: 2020-08-13

## 2020-08-25 ENCOUNTER — TELEPHONE (OUTPATIENT)
Dept: GASTROENTEROLOGY | Facility: CLINIC | Age: 45
End: 2020-08-25

## 2020-08-25 NOTE — TELEPHONE ENCOUNTER
Spoke with patient.  Scheduled to see  in [facility] on 9/1 for 8:00.  Confirmation mailed.   Christina

## 2020-08-27 ENCOUNTER — LAB VISIT (OUTPATIENT)
Dept: LAB | Facility: HOSPITAL | Age: 45
End: 2020-08-27
Attending: INTERNAL MEDICINE
Payer: COMMERCIAL

## 2020-08-27 ENCOUNTER — TELEPHONE (OUTPATIENT)
Dept: INTERNAL MEDICINE | Facility: CLINIC | Age: 45
End: 2020-08-27

## 2020-08-27 DIAGNOSIS — N39.0 URINARY TRACT INFECTION WITHOUT HEMATURIA, SITE UNSPECIFIED: Primary | ICD-10-CM

## 2020-08-27 DIAGNOSIS — N39.0 URINARY TRACT INFECTION WITHOUT HEMATURIA, SITE UNSPECIFIED: ICD-10-CM

## 2020-08-27 LAB
BACTERIA #/AREA URNS AUTO: NORMAL /HPF
BILIRUB UR QL STRIP: NEGATIVE
CLARITY UR REFRACT.AUTO: ABNORMAL
COLOR UR AUTO: YELLOW
GLUCOSE UR QL STRIP: NEGATIVE
HGB UR QL STRIP: ABNORMAL
KETONES UR QL STRIP: NEGATIVE
LEUKOCYTE ESTERASE UR QL STRIP: ABNORMAL
MICROSCOPIC COMMENT: NORMAL
NITRITE UR QL STRIP: NEGATIVE
PH UR STRIP: 5 [PH] (ref 5–8)
PROT UR QL STRIP: NEGATIVE
RBC #/AREA URNS AUTO: 1 /HPF (ref 0–4)
SP GR UR STRIP: 1.02 (ref 1–1.03)
SQUAMOUS #/AREA URNS AUTO: 4 /HPF
URN SPEC COLLECT METH UR: ABNORMAL
WBC #/AREA URNS AUTO: 3 /HPF (ref 0–5)

## 2020-08-27 PROCEDURE — 87077 CULTURE AEROBIC IDENTIFY: CPT

## 2020-08-27 PROCEDURE — 87086 URINE CULTURE/COLONY COUNT: CPT

## 2020-08-27 PROCEDURE — 87088 URINE BACTERIA CULTURE: CPT

## 2020-08-27 PROCEDURE — 81001 URINALYSIS AUTO W/SCOPE: CPT

## 2020-08-27 PROCEDURE — 87186 SC STD MICRODIL/AGAR DIL: CPT

## 2020-08-27 RX ORDER — CIPROFLOXACIN 500 MG/1
500 TABLET ORAL EVERY 12 HOURS
Qty: 14 TABLET | Refills: 0 | Status: SHIPPED | OUTPATIENT
Start: 2020-08-27 | End: 2020-09-03

## 2020-08-29 LAB — BACTERIA UR CULT: ABNORMAL

## 2020-08-30 ENCOUNTER — TELEPHONE (OUTPATIENT)
Dept: INTERNAL MEDICINE | Facility: CLINIC | Age: 45
End: 2020-08-30

## 2020-08-30 RX ORDER — SULFAMETHOXAZOLE AND TRIMETHOPRIM 800; 160 MG/1; MG/1
1 TABLET ORAL 2 TIMES DAILY
Qty: 14 TABLET | Refills: 0 | Status: SHIPPED | OUTPATIENT
Start: 2020-08-30 | End: 2020-09-06

## 2020-09-01 ENCOUNTER — PATIENT OUTREACH (OUTPATIENT)
Dept: ADMINISTRATIVE | Facility: OTHER | Age: 45
End: 2020-09-01

## 2020-09-01 ENCOUNTER — OFFICE VISIT (OUTPATIENT)
Dept: GASTROENTEROLOGY | Facility: CLINIC | Age: 45
End: 2020-09-01
Payer: COMMERCIAL

## 2020-09-01 VITALS — BODY MASS INDEX: 29.07 KG/M2 | WEIGHT: 164.13 LBS

## 2020-09-01 DIAGNOSIS — K59.04 CHRONIC IDIOPATHIC CONSTIPATION: ICD-10-CM

## 2020-09-01 PROCEDURE — 99204 OFFICE O/P NEW MOD 45 MIN: CPT | Mod: S$GLB,,, | Performed by: INTERNAL MEDICINE

## 2020-09-01 PROCEDURE — 99999 PR PBB SHADOW E&M-EST. PATIENT-LVL II: CPT | Mod: PBBFAC,,, | Performed by: INTERNAL MEDICINE

## 2020-09-01 PROCEDURE — 3008F PR BODY MASS INDEX (BMI) DOCUMENTED: ICD-10-PCS | Mod: CPTII,S$GLB,, | Performed by: INTERNAL MEDICINE

## 2020-09-01 PROCEDURE — 3008F BODY MASS INDEX DOCD: CPT | Mod: CPTII,S$GLB,, | Performed by: INTERNAL MEDICINE

## 2020-09-01 PROCEDURE — 99999 PR PBB SHADOW E&M-EST. PATIENT-LVL II: ICD-10-PCS | Mod: PBBFAC,,, | Performed by: INTERNAL MEDICINE

## 2020-09-01 PROCEDURE — 99204 PR OFFICE/OUTPT VISIT, NEW, LEVL IV, 45-59 MIN: ICD-10-PCS | Mod: S$GLB,,, | Performed by: INTERNAL MEDICINE

## 2020-09-01 RX ORDER — LUBIPROSTONE 24 UG/1
24 CAPSULE ORAL 2 TIMES DAILY WITH MEALS
Qty: 60 CAPSULE | Refills: 5 | Status: SHIPPED | OUTPATIENT
Start: 2020-09-01 | End: 2021-07-21

## 2020-09-01 RX ORDER — LUBIPROSTONE 24 UG/1
24 CAPSULE ORAL 2 TIMES DAILY WITH MEALS
Qty: 60 CAPSULE | Refills: 0 | Status: SHIPPED | OUTPATIENT
Start: 2020-09-01 | End: 2020-09-01 | Stop reason: SDUPTHER

## 2020-09-01 NOTE — LETTER
September 1, 2020      Tyesha Bauer MD  [address]  [place] [address]           [place] [facility]  [facility] [address], [address] [place] [address]  Phone: [phone]  Fax: [phone]          Patient: Nora Conklin   MR Number: [id number]   YOB: 1975   Date of Visit: 9/1/2020       Dear Dr. Tyesha Bauer:    Thank you for referring Nora Conklin to me for evaluation. Attached you will find relevant portions of my assessment and plan of care.    If you have questions, please do not hesitate to call me. I look forward to following Nora Conklin along with you.    Sincerely,    Jose Granado MD    Enclosure  CC:  No Recipients    If you would like to receive this communication electronically, please contact [email or web address] or [phone] to request more information on [facility] access.    For providers and/or their staff who would like to refer a patient to [facility], please contact us through our one-stop-shop provider referral line, [facility], at [phone].    If you feel you have received this communication in error or would no longer like to receive these types of communications, please e-mail [email or web address]

## 2020-09-01 NOTE — PROGRESS NOTES
Health Maintenance Due   Topic Date Due    Influenza Vaccine (1) 09/01/2020     Updates were requested from care everywhere.  Chart was reviewed for overdue Proactive [facility] Encounters ([facility]) topics (CRS, Breast Cancer Screening, Eye exam)  Health Maintenance has been updated.  LINKS immunization registry triggered.  Immunizations were reconciled.

## 2020-09-01 NOTE — PROGRESS NOTES
Subjective:       Patient ID: Nora Conklin is a 45 y.o. female.    Chief Complaint: Constipation (tried miralax and colacce neither an enema or linzess worked), Abdominal Cramping, and Abdominal Pain    Patient here today to establish care with formed complaints.  Patient reports history chronic constipation, qualified is having 1 to 2 hard bowel movements a week with significant straining and abdominal pain positively modified by having a bowel movement.  She has tried multiple laxatives including MiraLax, Colace, and most recently Linzess (290 mcg qday) without significant improvement.  She denies any blood in her stool or unexplained weight loss.  She denies any family history of colon cancer.  Denies prior surgeries.  She does take Elavil for migraines.  She was previously on iron supplementation for headaches however she is not currently taking it.        Past Medical History:   Diagnosis Date    Medical history non-contributory     Migraine headache        Past Surgical History:   Procedure Laterality Date     SECTION         Social History  Social History     Tobacco Use    Smoking status: Never Smoker    Smokeless tobacco: Never Used   Substance Use Topics    Alcohol use: Yes     Alcohol/week: 0.0 standard drinks     Comment: rare    Drug use: No       Family History   Problem Relation Age of Onset    Cancer Maternal Grandmother         lung     Cervical cancer Mother     Emphysema Mother     Valvular heart disease Maternal Aunt        Review of Systems   Constitutional: Negative for chills, fever and unexpected weight change.   HENT: Negative for congestion and trouble swallowing.    Eyes: Negative for photophobia and visual disturbance.   Respiratory: Negative for cough and shortness of breath.    Cardiovascular: Negative for chest pain and leg swelling.   Gastrointestinal: Positive for abdominal pain and constipation. Negative for abdominal distention, blood in stool, diarrhea,  nausea and vomiting.   Genitourinary: Negative for dysuria and hematuria.   Musculoskeletal: Negative for arthralgias and myalgias.   Skin: Negative for color change and rash.   Neurological: Negative for dizziness, light-headedness and numbness.   Psychiatric/Behavioral: Negative for agitation and confusion.           Objective:      Physical Exam  Vitals signs and nursing note reviewed.   Constitutional:       Appearance: She is well-developed.   HENT:      Head: Normocephalic and atraumatic.   Eyes:      General: No scleral icterus.     Pupils: Pupils are equal, round, and reactive to light.   Neck:      Musculoskeletal: Normal range of motion and neck supple.   Cardiovascular:      Rate and Rhythm: Normal rate and regular rhythm.      Heart sounds: Normal heart sounds.   Pulmonary:      Effort: Pulmonary effort is normal. No respiratory distress.      Breath sounds: Normal breath sounds.   Abdominal:      General: Bowel sounds are normal. There is no distension.      Palpations: Abdomen is soft.      Tenderness: There is no abdominal tenderness.   Musculoskeletal: Normal range of motion.   Skin:     General: Skin is warm and dry.      Findings: No erythema or rash.   Neurological:      Mental Status: She is alert and oriented to person, place, and time.      Comments: No asterixis   Psychiatric:         Behavior: Behavior normal.           Pertinent labs and imaging studies reviewed    Assessment:       1. Chronic idiopathic constipation        Plan:       Without red flag symptoms  Will place on trial of Amitiza as previously failed high-dose Linzess  If no improvement consider colonoscopy  Increase fiber and water intake    (Portions of this note were dictated using voice recognition software and may contain dictation related errors in spelling/grammar/syntax not found on text review)

## 2020-09-22 ENCOUNTER — OFFICE VISIT (OUTPATIENT)
Dept: OBSTETRICS AND GYNECOLOGY | Facility: CLINIC | Age: 45
End: 2020-09-22
Payer: COMMERCIAL

## 2020-09-22 VITALS
BODY MASS INDEX: 28.87 KG/M2 | WEIGHT: 162.94 LBS | HEIGHT: 63 IN | SYSTOLIC BLOOD PRESSURE: 102 MMHG | DIASTOLIC BLOOD PRESSURE: 60 MMHG

## 2020-09-22 DIAGNOSIS — Z01.419 ENCOUNTER FOR GYNECOLOGICAL EXAMINATION WITHOUT ABNORMAL FINDING: Primary | ICD-10-CM

## 2020-09-22 DIAGNOSIS — Z11.3 SCREEN FOR STD (SEXUALLY TRANSMITTED DISEASE): ICD-10-CM

## 2020-09-22 PROCEDURE — 3008F PR BODY MASS INDEX (BMI) DOCUMENTED: ICD-10-PCS | Mod: CPTII,S$GLB,, | Performed by: OBSTETRICS & GYNECOLOGY

## 2020-09-22 PROCEDURE — 99999 PR PBB SHADOW E&M-EST. PATIENT-LVL III: ICD-10-PCS | Mod: PBBFAC,,, | Performed by: OBSTETRICS & GYNECOLOGY

## 2020-09-22 PROCEDURE — 87491 CHLMYD TRACH DNA AMP PROBE: CPT

## 2020-09-22 PROCEDURE — 99396 PR PREVENTIVE VISIT,EST,40-64: ICD-10-PCS | Mod: S$GLB,,, | Performed by: OBSTETRICS & GYNECOLOGY

## 2020-09-22 PROCEDURE — 99999 PR PBB SHADOW E&M-EST. PATIENT-LVL III: CPT | Mod: PBBFAC,,, | Performed by: OBSTETRICS & GYNECOLOGY

## 2020-09-22 PROCEDURE — 99396 PREV VISIT EST AGE 40-64: CPT | Mod: S$GLB,,, | Performed by: OBSTETRICS & GYNECOLOGY

## 2020-09-22 PROCEDURE — 3008F BODY MASS INDEX DOCD: CPT | Mod: CPTII,S$GLB,, | Performed by: OBSTETRICS & GYNECOLOGY

## 2020-09-22 NOTE — PROGRESS NOTES
"CC: Well woman exam    Nora Conklin is a 45 y.o. female  presents for a well woman exam.   She has normal cycles.   NO foul odor. She would like STD testing  She had a normal US in 2018 for nodular area behind the vaginal cuff.           Transabdominal and transvaginal images were obtained.  The uterus measures 7.9 x 5 x 6.3 cm, within the normal range.  Endometrial stripe is 10 mm, normal.     Left ovary has a normal size and appearance and measures 2.9 x 1.7 x 2.1 cm.     The right ovary measures 4.3 x 3.7 x 4.6 cm.  It contains a 2.6 x 3.7 x 3.4 cm cyst with hypoechoic foci  within.  These complex regions contain no flow.      ovaries have arterial and venous flow.  IMPRESSION:    3.7 cm hemorrhagic cyst within the right ovary.       Past Medical History:   Diagnosis Date    Medical history non-contributory     Migraine headache        Past Surgical History:   Procedure Laterality Date     SECTION         OB History    Para Term  AB Living   3 3 3     3   SAB TAB Ectopic Multiple Live Births                  # Outcome Date GA Lbr Jamar/2nd Weight Sex Delivery Anes PTL Lv   3 Term            2 Term            1 Term                Family History   Problem Relation Age of Onset    Cancer Maternal Grandmother         lung     Cervical cancer Mother     Emphysema Mother     Valvular heart disease Maternal Aunt        Social History     Tobacco Use    Smoking status: Never Smoker    Smokeless tobacco: Never Used   Substance Use Topics    Alcohol use: Yes     Alcohol/week: 0.0 standard drinks     Comment: rare    Drug use: No       /60   Ht 5' 3" (1.6 m)   Wt 73.9 kg (162 lb 14.7 oz)   LMP 2020 (Approximate)   BMI 28.86 kg/m²     ROS:  GENERAL: Denies weight gain or weight loss. Feeling well overall.   SKIN: Denies rash or lesions.   HEAD: Denies head injury or headache.   NODES: Denies enlarged lymph nodes.   CHEST: Denies chest pain or shortness of breath. "   CARDIOVASCULAR: Denies palpitations or left sided chest pain.   ABDOMEN: No abdominal pain, constipation, diarrhea, nausea, vomiting or rectal bleeding.   URINARY: No frequency, dysuria, hematuria, or burning on urination.  REPRODUCTIVE: See HPI.   BREASTS: The patient performs breast self-examination and denies pain, lumps, or nipple discharge.   HEMATOLOGIC: No easy bruisability or excessive bleeding.  MUSCULOSKELETAL: Denies joint pain or swelling.   NEUROLOGIC: Denies syncope or weakness.   PSYCHIATRIC: Denies depression, anxiety or mood swings.    Physical Exam:    APPEARANCE: Well nourished, well developed, in no acute distress.  AFFECT: WNL, alert and oriented x 3  SKIN: No acne or hirsutism  NECK: Neck symmetric without masses or thyromegaly  NODES: No inguinal, cervical, axillary, or femoral lymph node enlargement  CHEST: Good respiratory effect  ABDOMEN: Soft.  No tenderness or masses.  No hepatosplenomegaly.  No hernias.  BREASTS: Symmetrical, no skin changes or visible lesions.  No palpable masses, nipple discharge bilaterally.  PELVIC: Normal external genitalia without lesions.  Normal hair distribution.  Adequate perineal body, normal urethral meatus.  Vagina moist and well rugated without lesions or discharge.  Cervix pink, without lesions, discharge or tenderness.  No significant cystocele or rectocele.  Bimanual exam shows uterus to be normal size, regular, mobile and nontender.  Adnexa without masses or tenderness.    EXTREMITIES: No edema.      ASSESSMENT AND PLAN  1. Encounter for gynecological examination without abnormal finding     2. Screen for STD (sexually transmitted disease)  C. trachomatis/N. gonorrhoeae by AMP DNA    HIV 1/2 Ag/Ab (4th Gen)    Hepatitis Panel, Acute    RPR    Vaginosis Screen by DNA Probe    Herpes simplex type 1 & 2 IgM,Herpes IgM    Herpes simplex type 1&2 IgG,Herpes titer     If she has any pain or concerns we can consider Pelvic MRI or Dx scope  She denies any  abnormal bleeding or sc    Patient was counseled today on A.C.S. Pap guidelines and recommendations for yearly pelvic exams, mammograms and monthly self breast exams; to see her PCP for other health maintenance.       Follow up in about 1 year (around 9/22/2021).

## 2020-09-23 ENCOUNTER — LAB VISIT (OUTPATIENT)
Dept: LAB | Facility: HOSPITAL | Age: 45
End: 2020-09-23
Attending: INTERNAL MEDICINE
Payer: COMMERCIAL

## 2020-09-23 DIAGNOSIS — Z11.3 SCREEN FOR STD (SEXUALLY TRANSMITTED DISEASE): ICD-10-CM

## 2020-09-23 PROCEDURE — 86703 HIV-1/HIV-2 1 RESULT ANTBDY: CPT

## 2020-09-23 PROCEDURE — 86694 HERPES SIMPLEX NES ANTBDY: CPT

## 2020-09-23 PROCEDURE — 86592 SYPHILIS TEST NON-TREP QUAL: CPT

## 2020-09-23 PROCEDURE — 80074 ACUTE HEPATITIS PANEL: CPT

## 2020-09-23 PROCEDURE — 86696 HERPES SIMPLEX TYPE 2 TEST: CPT

## 2020-09-23 PROCEDURE — 36415 COLL VENOUS BLD VENIPUNCTURE: CPT

## 2020-09-24 LAB
CANDIDA RRNA VAG QL PROBE: NEGATIVE
G VAGINALIS RRNA GENITAL QL PROBE: POSITIVE
HAV IGM SERPL QL IA: NEGATIVE
HBV CORE IGM SERPL QL IA: NEGATIVE
HBV SURFACE AG SERPL QL IA: NEGATIVE
HCV AB SERPL QL IA: NEGATIVE
HIV 1+2 AB+HIV1 P24 AG SERPL QL IA: NEGATIVE
RPR SER QL: NORMAL
T VAGINALIS RRNA GENITAL QL PROBE: NEGATIVE

## 2020-09-25 ENCOUNTER — TELEPHONE (OUTPATIENT)
Dept: INTERNAL MEDICINE | Facility: CLINIC | Age: 45
End: 2020-09-25

## 2020-09-25 ENCOUNTER — LAB VISIT (OUTPATIENT)
Dept: LAB | Facility: HOSPITAL | Age: 45
End: 2020-09-25
Attending: INTERNAL MEDICINE
Payer: COMMERCIAL

## 2020-09-25 DIAGNOSIS — N39.0 URINARY TRACT INFECTION WITHOUT HEMATURIA, SITE UNSPECIFIED: ICD-10-CM

## 2020-09-25 DIAGNOSIS — N39.0 URINARY TRACT INFECTION WITHOUT HEMATURIA, SITE UNSPECIFIED: Primary | ICD-10-CM

## 2020-09-25 LAB
BILIRUB UR QL STRIP: NEGATIVE
CLARITY UR REFRACT.AUTO: ABNORMAL
COLOR UR AUTO: YELLOW
GLUCOSE UR QL STRIP: NEGATIVE
HGB UR QL STRIP: NEGATIVE
KETONES UR QL STRIP: NEGATIVE
LEUKOCYTE ESTERASE UR QL STRIP: NEGATIVE
NITRITE UR QL STRIP: NEGATIVE
PH UR STRIP: 6 [PH] (ref 5–8)
PROT UR QL STRIP: NEGATIVE
SP GR UR STRIP: 1.02 (ref 1–1.03)
URN SPEC COLLECT METH UR: ABNORMAL

## 2020-09-25 PROCEDURE — 81003 URINALYSIS AUTO W/O SCOPE: CPT

## 2020-09-25 PROCEDURE — 87086 URINE CULTURE/COLONY COUNT: CPT

## 2020-09-26 LAB
BACTERIA UR CULT: NO GROWTH
HSV1 IGG SERPL QL IA: POSITIVE
HSV2 IGG SERPL QL IA: POSITIVE

## 2020-09-28 LAB — HSV AB, IGM BY EIA: 0.3 INDEX

## 2020-10-08 DIAGNOSIS — N76.0 BACTERIAL VAGINOSIS: Primary | ICD-10-CM

## 2020-10-08 DIAGNOSIS — B96.89 BACTERIAL VAGINOSIS: Primary | ICD-10-CM

## 2020-10-08 RX ORDER — METRONIDAZOLE 500 MG/1
500 TABLET ORAL 2 TIMES DAILY
Qty: 14 TABLET | Refills: 0 | Status: SHIPPED | OUTPATIENT
Start: 2020-10-08 | End: 2020-10-09 | Stop reason: SDUPTHER

## 2020-10-08 RX ORDER — METRONIDAZOLE 500 MG/1
500 TABLET ORAL 2 TIMES DAILY
Qty: 14 TABLET | Refills: 0 | Status: SHIPPED | OUTPATIENT
Start: 2020-10-08 | End: 2020-10-22

## 2020-10-09 ENCOUNTER — PATIENT MESSAGE (OUTPATIENT)
Dept: OBSTETRICS AND GYNECOLOGY | Facility: CLINIC | Age: 45
End: 2020-10-09

## 2020-10-09 DIAGNOSIS — B96.89 BACTERIAL VAGINOSIS: ICD-10-CM

## 2020-10-09 DIAGNOSIS — N76.0 BACTERIAL VAGINOSIS: ICD-10-CM

## 2020-10-09 RX ORDER — METRONIDAZOLE 500 MG/1
500 TABLET ORAL 2 TIMES DAILY
Qty: 14 TABLET | Refills: 0 | Status: SHIPPED | OUTPATIENT
Start: 2020-10-09 | End: 2020-10-27

## 2020-10-19 RX ORDER — AMOXICILLIN AND CLAVULANATE POTASSIUM 875; 125 MG/1; MG/1
1 TABLET, FILM COATED ORAL EVERY 12 HOURS
Qty: 14 TABLET | Refills: 0 | Status: SHIPPED | OUTPATIENT
Start: 2020-10-19 | End: 2020-10-27

## 2020-11-24 ENCOUNTER — CLINICAL SUPPORT (OUTPATIENT)
Dept: INTERNAL MEDICINE | Facility: CLINIC | Age: 45
End: 2020-11-24
Payer: COMMERCIAL

## 2020-11-24 ENCOUNTER — OFFICE VISIT (OUTPATIENT)
Dept: INTERNAL MEDICINE | Facility: CLINIC | Age: 45
End: 2020-11-24
Payer: COMMERCIAL

## 2020-11-24 ENCOUNTER — DOCUMENTATION ONLY (OUTPATIENT)
Dept: INTERNAL MEDICINE | Facility: CLINIC | Age: 45
End: 2020-11-24

## 2020-11-24 ENCOUNTER — TELEPHONE (OUTPATIENT)
Dept: PRIMARY CARE CLINIC | Facility: OTHER | Age: 45
End: 2020-11-24

## 2020-11-24 ENCOUNTER — OCCUPATIONAL HEALTH (OUTPATIENT)
Dept: URGENT CARE | Facility: CLINIC | Age: 45
End: 2020-11-24

## 2020-11-24 DIAGNOSIS — R51.9 HEAD ACHE: ICD-10-CM

## 2020-11-24 DIAGNOSIS — R11.0 NAUSEA: ICD-10-CM

## 2020-11-24 DIAGNOSIS — R68.83 CHILLS: ICD-10-CM

## 2020-11-24 DIAGNOSIS — R68.83 CHILLS: Primary | ICD-10-CM

## 2020-11-24 LAB
BACTERIA #/AREA URNS AUTO: ABNORMAL /HPF
BILIRUB UR QL STRIP: NEGATIVE
CLARITY UR REFRACT.AUTO: CLEAR
COLOR UR AUTO: YELLOW
CTP QC/QA: YES
GLUCOSE UR QL STRIP: NEGATIVE
HGB UR QL STRIP: NEGATIVE
HYALINE CASTS UR QL AUTO: 0 /LPF
INFLUENZA A, MOLECULAR: NEGATIVE
INFLUENZA B, MOLECULAR: NEGATIVE
KETONES UR QL STRIP: NEGATIVE
LEUKOCYTE ESTERASE UR QL STRIP: NEGATIVE
MICROSCOPIC COMMENT: ABNORMAL
NITRITE UR QL STRIP: NEGATIVE
PH UR STRIP: 7 [PH] (ref 5–8)
PROT UR QL STRIP: ABNORMAL
RBC #/AREA URNS AUTO: 10 /HPF (ref 0–4)
SARS-COV-2 RDRP RESP QL NAA+PROBE: NEGATIVE
SP GR UR STRIP: 1.02 (ref 1–1.03)
SPECIMEN SOURCE: NORMAL
SQUAMOUS #/AREA URNS AUTO: 3 /HPF
URN SPEC COLLECT METH UR: ABNORMAL
WBC #/AREA URNS AUTO: 3 /HPF (ref 0–5)

## 2020-11-24 PROCEDURE — 81001 URINALYSIS AUTO W/SCOPE: CPT

## 2020-11-24 PROCEDURE — U0002: ICD-10-PCS | Mod: QW,S$GLB,, | Performed by: INTERNAL MEDICINE

## 2020-11-24 PROCEDURE — 99499 NO LOS: ICD-10-PCS | Mod: S$GLB,,, | Performed by: INTERNAL MEDICINE

## 2020-11-24 PROCEDURE — S0119 PR ONDANSETRON, ORAL, 4MG: ICD-10-PCS | Mod: S$GLB,,, | Performed by: INTERNAL MEDICINE

## 2020-11-24 PROCEDURE — 87086 URINE CULTURE/COLONY COUNT: CPT

## 2020-11-24 PROCEDURE — 87502 INFLUENZA DNA AMP PROBE: CPT

## 2020-11-24 PROCEDURE — S0119 ONDANSETRON 4 MG: HCPCS | Mod: S$GLB,,, | Performed by: INTERNAL MEDICINE

## 2020-11-24 PROCEDURE — U0002 COVID-19 LAB TEST NON-CDC: HCPCS | Mod: QW,S$GLB,, | Performed by: INTERNAL MEDICINE

## 2020-11-24 PROCEDURE — 99499 UNLISTED E&M SERVICE: CPT | Mod: S$GLB,,, | Performed by: INTERNAL MEDICINE

## 2020-11-24 RX ORDER — ONDANSETRON 4 MG/1
4 TABLET, ORALLY DISINTEGRATING ORAL
Status: COMPLETED | OUTPATIENT
Start: 2020-11-24 | End: 2020-11-24

## 2020-11-24 RX ORDER — ONDANSETRON 4 MG/1
4 TABLET, FILM COATED ORAL EVERY 8 HOURS PRN
Qty: 20 TABLET | Refills: 0 | Status: SHIPPED | OUTPATIENT
Start: 2020-11-24 | End: 2022-05-10 | Stop reason: SDUPTHER

## 2020-11-24 RX ADMIN — ONDANSETRON 4 MG: 4 TABLET, ORALLY DISINTEGRATING ORAL at 08:11

## 2020-11-24 NOTE — PROGRESS NOTES
Dr. Bauer seen and examined patient. Zofran 4mg by mouth given as ordered for c/o nausea & vomiting.

## 2020-11-24 NOTE — PROGRESS NOTES
[facility] Rapid [facility]    CDC Testing and Quarantine Guidelines for Exposure:    A close exposure is defined as anyone who had a masked or an unmasked exposure to a known COVID -19 positive person, at less than 6 ft for more than 15 minutes. If your exposure meets this definition, then you are required to quarantine for 14 days per the CDC. They now recommend that a test can be performed if you are asymptomatic (someone who does not have any symptoms), and a test should be done if you develop symptoms after an exposure as described above.         If you meet the definition of a close exposure, it does not matter whether or not you are asymptomatic or symptomatic - A NEGATIVE TEST DOES NOT GET YOU OUT OF 14 DAYS OF QUARANTINE!         Please note that if you are asymptomatic and wait more than 4 days to test after an exposure, you risk lengthening your quarantine. This is because if you test positive as an asymptomatic, your isolation is 10 days from the date of the positive test, not the date of exposure. So for example, if you test positive as an asymptomatic on day 7 from exposure, you have now extended your 14 day quarantine to a 17 day isolation.         If your exposure does not meet the above definition, you may return to your normal activities including social distancing, wearing masks, and frequent handwashing.

## 2020-11-24 NOTE — PROGRESS NOTES
Subjective:       Patient ID: Nora Conklin is a 45 y.o. female.    Chief Complaint - HA and chills    HPIPt was fine yesterday - awoke this AM with severe HA and chills, took an NSAID with little relief - no fever now.  Does not feel well.  SOme nausea - no vomiting.  At the end of her cycle - denies pregnancy.  Had some diarrhea on the weekend.    Review of Systems   Constitutional: Positive for chills and fatigue.   Respiratory: Negative for shortness of breath (PND or orthopnea).    Cardiovascular: Negative for chest pain (arm pain or jaw pain).   Gastrointestinal: Positive for nausea. Negative for abdominal pain, diarrhea and vomiting.   Genitourinary: Negative for dysuria.   Neurological: Positive for headaches. Negative for seizures and syncope.       Objective:      Physical Exam  Constitutional:       General: She is not in acute distress.     Appearance: She is well-developed.   HENT:      Head: Normocephalic.   Neck:      Musculoskeletal: Neck supple.      Thyroid: No thyromegaly.      Vascular: No JVD.   Cardiovascular:      Rate and Rhythm: Normal rate and regular rhythm.      Heart sounds: Normal heart sounds. No murmur. No friction rub. No gallop.    Pulmonary:      Effort: Pulmonary effort is normal.      Breath sounds: Normal breath sounds. No wheezing or rales.   Abdominal:      General: Bowel sounds are normal. There is no distension.      Palpations: Abdomen is soft. There is no mass.      Tenderness: There is no abdominal tenderness. There is no guarding or rebound.   Lymphadenopathy:      Cervical: No cervical adenopathy.   Skin:     General: Skin is warm and dry.   Neurological:      Mental Status: She is alert and oriented to person, place, and time.      Deep Tendon Reflexes: Reflexes are normal and symmetric.   Psychiatric:         Behavior: Behavior normal.         Thought Content: Thought content normal.         Judgment: Judgment normal.         Assessment:       1. Chills         Plan:   Chills  -     Influenza A & B by Molecular  -     Urinalysis  -     Urine culture    Other orders  -     Urinalysis Microscopic      Went to  for employee rapid COVID swab

## 2020-11-25 LAB
BACTERIA UR CULT: NORMAL
BACTERIA UR CULT: NORMAL

## 2020-11-25 RX ORDER — KETOROLAC TROMETHAMINE 30 MG/ML
30 INJECTION, SOLUTION INTRAMUSCULAR; INTRAVENOUS ONCE
Status: SHIPPED | OUTPATIENT
Start: 2020-11-25 | End: 2020-11-28

## 2020-12-21 ENCOUNTER — OCCUPATIONAL HEALTH (OUTPATIENT)
Dept: URGENT CARE | Facility: CLINIC | Age: 45
End: 2020-12-21

## 2020-12-21 ENCOUNTER — TELEPHONE (OUTPATIENT)
Dept: PRIMARY CARE CLINIC | Facility: OTHER | Age: 45
End: 2020-12-21

## 2020-12-21 ENCOUNTER — PATIENT MESSAGE (OUTPATIENT)
Dept: OBSTETRICS AND GYNECOLOGY | Facility: CLINIC | Age: 45
End: 2020-12-21

## 2020-12-21 DIAGNOSIS — R43.0 LOSS OF SMELL: ICD-10-CM

## 2020-12-21 DIAGNOSIS — Z20.822 EXPOSURE TO COVID-19 VIRUS: Primary | ICD-10-CM

## 2020-12-21 DIAGNOSIS — R43.2 LOSS OF TASTE: Primary | ICD-10-CM

## 2020-12-21 DIAGNOSIS — U07.1 COVID-19 VIRUS DETECTED: ICD-10-CM

## 2020-12-21 DIAGNOSIS — R43.2 LOSS OF TASTE: ICD-10-CM

## 2020-12-21 LAB
CTP QC/QA: YES
SARS-COV-2 RDRP RESP QL NAA+PROBE: POSITIVE

## 2020-12-21 PROCEDURE — U0002 COVID-19 LAB TEST NON-CDC: HCPCS | Mod: QW,S$GLB,, | Performed by: INTERNAL MEDICINE

## 2020-12-21 PROCEDURE — U0002: ICD-10-PCS | Mod: QW,S$GLB,, | Performed by: INTERNAL MEDICINE

## 2020-12-22 ENCOUNTER — TELEPHONE (OUTPATIENT)
Dept: PRIMARY CARE CLINIC | Facility: OTHER | Age: 45
End: 2020-12-22

## 2020-12-22 NOTE — TELEPHONE ENCOUNTER
Spoke to patient, discussed POSITIVE COVID-19 result. Symptom onset 12/21/2020, estimated return to work date 01/01/2020. Unknown exposure. All questions were answered.

## 2020-12-31 ENCOUNTER — TELEPHONE (OUTPATIENT)
Dept: INTERNAL MEDICINE | Facility: CLINIC | Age: 45
End: 2020-12-31

## 2021-01-02 ENCOUNTER — IMMUNIZATION (OUTPATIENT)
Dept: INTERNAL MEDICINE | Facility: CLINIC | Age: 46
End: 2021-01-02
Payer: COMMERCIAL

## 2021-01-02 DIAGNOSIS — Z23 NEED FOR VACCINATION: ICD-10-CM

## 2021-01-02 PROCEDURE — 91300 COVID-19, MRNA, LNP-S, PF, 30 MCG/0.3 ML DOSE VACCINE: CPT | Mod: PBBFAC | Performed by: INTERNAL MEDICINE

## 2021-01-23 ENCOUNTER — IMMUNIZATION (OUTPATIENT)
Dept: INTERNAL MEDICINE | Facility: CLINIC | Age: 46
End: 2021-01-23
Payer: COMMERCIAL

## 2021-01-23 DIAGNOSIS — Z23 NEED FOR VACCINATION: Primary | ICD-10-CM

## 2021-01-23 PROCEDURE — 0002A COVID-19, MRNA, LNP-S, PF, 30 MCG/0.3 ML DOSE VACCINE: CPT | Mod: PBBFAC | Performed by: INTERNAL MEDICINE

## 2021-01-23 PROCEDURE — 91300 COVID-19, MRNA, LNP-S, PF, 30 MCG/0.3 ML DOSE VACCINE: CPT | Mod: PBBFAC | Performed by: INTERNAL MEDICINE

## 2021-04-05 ENCOUNTER — PATIENT MESSAGE (OUTPATIENT)
Dept: ADMINISTRATIVE | Facility: HOSPITAL | Age: 46
End: 2021-04-05

## 2021-04-06 ENCOUNTER — TELEPHONE (OUTPATIENT)
Dept: INTERNAL MEDICINE | Facility: CLINIC | Age: 46
End: 2021-04-06

## 2021-04-06 ENCOUNTER — HOSPITAL ENCOUNTER (OUTPATIENT)
Dept: RADIOLOGY | Facility: HOSPITAL | Age: 46
Discharge: HOME | End: 2021-04-06
Attending: INTERNAL MEDICINE
Payer: COMMERCIAL

## 2021-04-06 DIAGNOSIS — M54.9 DORSALGIA, UNSPECIFIED: ICD-10-CM

## 2021-04-06 PROCEDURE — 72070 XR THORACIC SPINE AP LATERAL: ICD-10-PCS | Mod: 26,,, | Performed by: RADIOLOGY

## 2021-04-06 PROCEDURE — 72070 X-RAY EXAM THORAC SPINE 2VWS: CPT | Mod: 26,,, | Performed by: RADIOLOGY

## 2021-04-06 PROCEDURE — 72100 X-RAY EXAM L-S SPINE 2/3 VWS: CPT | Mod: 26,,, | Performed by: RADIOLOGY

## 2021-04-06 PROCEDURE — 72100 XR LUMBAR SPINE AP AND LATERAL: ICD-10-PCS | Mod: 26,,, | Performed by: RADIOLOGY

## 2021-04-06 PROCEDURE — 72100 X-RAY EXAM L-S SPINE 2/3 VWS: CPT | Mod: TC

## 2021-04-06 PROCEDURE — 72070 X-RAY EXAM THORAC SPINE 2VWS: CPT | Mod: TC

## 2021-04-06 RX ORDER — MELOXICAM 15 MG/1
15 TABLET ORAL DAILY
Qty: 20 TABLET | Refills: 0 | Status: SHIPPED | OUTPATIENT
Start: 2021-04-06 | End: 2022-05-10

## 2021-04-21 DIAGNOSIS — L02.419 ABSCESS, AXILLA: Primary | ICD-10-CM

## 2021-04-21 RX ORDER — AMOXICILLIN AND CLAVULANATE POTASSIUM 875; 125 MG/1; MG/1
1 TABLET, FILM COATED ORAL 2 TIMES DAILY
Qty: 20 TABLET | Refills: 0 | Status: SHIPPED | OUTPATIENT
Start: 2021-04-21 | End: 2021-04-21 | Stop reason: SDUPTHER

## 2021-04-21 RX ORDER — AMOXICILLIN AND CLAVULANATE POTASSIUM 875; 125 MG/1; MG/1
1 TABLET, FILM COATED ORAL 2 TIMES DAILY
Qty: 20 TABLET | Refills: 0 | Status: SHIPPED | OUTPATIENT
Start: 2021-04-21 | End: 2021-05-01

## 2021-05-25 ENCOUNTER — HOSPITAL ENCOUNTER (OUTPATIENT)
Dept: RADIOLOGY | Facility: HOSPITAL | Age: 46
Discharge: HOME | End: 2021-05-25
Attending: INTERNAL MEDICINE
Payer: COMMERCIAL

## 2021-05-25 DIAGNOSIS — Z12.31 SCREENING MAMMOGRAM, ENCOUNTER FOR: ICD-10-CM

## 2021-05-25 PROCEDURE — 77067 SCR MAMMO BI INCL CAD: CPT | Mod: TC,PO

## 2021-06-10 ENCOUNTER — HOSPITAL ENCOUNTER (EMERGENCY)
Facility: HOSPITAL | Age: 46
Discharge: HOME | End: 2021-06-10
Attending: EMERGENCY MEDICINE
Payer: COMMERCIAL

## 2021-06-10 VITALS
DIASTOLIC BLOOD PRESSURE: 74 MMHG | BODY MASS INDEX: 30.78 KG/M2 | RESPIRATION RATE: 12 BRPM | WEIGHT: 163 LBS | HEART RATE: 75 BPM | HEIGHT: 61 IN | SYSTOLIC BLOOD PRESSURE: 119 MMHG | OXYGEN SATURATION: 99 % | TEMPERATURE: 98 F
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
## 2021-06-10 DIAGNOSIS — S16.1XXA STRAIN OF NECK MUSCLE, INITIAL ENCOUNTER: ICD-10-CM

## 2021-06-10 PROCEDURE — 25000003 PHARM REV CODE 250: Mod: ER | Performed by: PHYSICIAN ASSISTANT

## 2021-06-10 PROCEDURE — 99283 EMERGENCY DEPT VISIT LOW MDM: CPT | Mod: ER

## 2021-06-10 RX ORDER — KETOROLAC TROMETHAMINE 10 MG/1
10 TABLET, FILM COATED ORAL
Status: COMPLETED | OUTPATIENT
Start: 2021-06-10 | End: 2021-06-10

## 2021-06-10 RX ORDER — METHOCARBAMOL 500 MG/1
500 TABLET, FILM COATED ORAL 3 TIMES DAILY PRN
Qty: 15 TABLET | Refills: 0 | Status: SHIPPED | OUTPATIENT
Start: 2021-06-10 | End: 2021-06-15

## 2021-06-10 RX ADMIN — KETOROLAC TROMETHAMINE 10 MG: 10 TABLET, FILM COATED ORAL at 07:06

## 2021-07-06 ENCOUNTER — PATIENT MESSAGE (OUTPATIENT)
Dept: ADMINISTRATIVE | Facility: HOSPITAL | Age: 46
End: 2021-07-06

## 2021-07-23 ENCOUNTER — TELEPHONE (OUTPATIENT)
Dept: INTERNAL MEDICINE | Facility: CLINIC | Age: 46
End: 2021-07-23

## 2021-07-23 ENCOUNTER — HOSPITAL ENCOUNTER (OUTPATIENT)
Dept: RADIOLOGY | Facility: HOSPITAL | Age: 46
Discharge: HOME | End: 2021-07-23
Attending: INTERNAL MEDICINE
Payer: COMMERCIAL

## 2021-07-23 DIAGNOSIS — R10.9 ABDOMINAL PAIN, UNSPECIFIED ABDOMINAL LOCATION: Primary | ICD-10-CM

## 2021-07-23 DIAGNOSIS — R10.9 ABDOMINAL PAIN, UNSPECIFIED ABDOMINAL LOCATION: ICD-10-CM

## 2021-07-23 PROCEDURE — 74019 RADEX ABDOMEN 2 VIEWS: CPT | Mod: TC

## 2021-07-23 PROCEDURE — 74019 XR ABDOMEN FLAT AND ERECT: ICD-10-PCS | Mod: 26,,, | Performed by: RADIOLOGY

## 2021-07-23 PROCEDURE — 74019 RADEX ABDOMEN 2 VIEWS: CPT | Mod: 26,,, | Performed by: RADIOLOGY

## 2021-07-23 RX ORDER — CIPROFLOXACIN 500 MG/1
500 TABLET ORAL EVERY 12 HOURS
Qty: 14 TABLET | Refills: 0 | Status: SHIPPED | OUTPATIENT
Start: 2021-07-23 | End: 2022-05-10 | Stop reason: CLARIF

## 2021-07-23 RX ORDER — GENTAMICIN SULFATE 40 MG/ML
120 INJECTION, SOLUTION INTRAMUSCULAR; INTRAVENOUS
Status: SHIPPED | OUTPATIENT
Start: 2021-07-23

## 2021-09-01 NOTE — TELEPHONE ENCOUNTER
Left voice message for patient to schedule appointment from referral to Nutrition Services.  Juan Manuel GARCIA  [phone]     Cimzia Counseling:  I discussed with the patient the risks of Cimzia including but not limited to immunosuppression, allergic reactions and infections.  The patient understands that monitoring is required including a PPD at baseline and must alert us or the primary physician if symptoms of infection or other concerning signs are noted.

## 2021-10-05 ENCOUNTER — PATIENT MESSAGE (OUTPATIENT)
Dept: ADMINISTRATIVE | Facility: HOSPITAL | Age: 46
End: 2021-10-05

## 2021-10-14 ENCOUNTER — TELEPHONE (OUTPATIENT)
Dept: INTERNAL MEDICINE | Facility: CLINIC | Age: 46
End: 2021-10-14

## 2021-10-14 DIAGNOSIS — F98.8 ATTENTION DEFICIT DISORDER, UNSPECIFIED HYPERACTIVITY PRESENCE: Primary | ICD-10-CM

## 2021-10-14 RX ORDER — DEXTROAMPHETAMINE SACCHARATE, AMPHETAMINE ASPARTATE MONOHYDRATE, DEXTROAMPHETAMINE SULFATE AND AMPHETAMINE SULFATE 2.5; 2.5; 2.5; 2.5 MG/1; MG/1; MG/1; MG/1
10 CAPSULE, EXTENDED RELEASE ORAL DAILY
Qty: 30 CAPSULE | Refills: 0 | Status: SHIPPED | OUTPATIENT
Start: 2021-10-14 | End: 2021-11-30 | Stop reason: SDUPTHER

## 2021-12-31 ENCOUNTER — TELEPHONE (OUTPATIENT)
Dept: INTERNAL MEDICINE | Facility: CLINIC | Age: 46
End: 2021-12-31
Payer: COMMERCIAL

## 2021-12-31 RX ORDER — IBUPROFEN 800 MG/1
800 TABLET ORAL 3 TIMES DAILY PRN
Qty: 90 TABLET | Refills: 1 | Status: SHIPPED | OUTPATIENT
Start: 2021-12-31 | End: 2022-08-30 | Stop reason: SDUPTHER

## 2022-01-26 ENCOUNTER — PATIENT MESSAGE (OUTPATIENT)
Dept: ADMINISTRATIVE | Facility: HOSPITAL | Age: 47
End: 2022-01-26
Payer: COMMERCIAL

## 2022-02-10 ENCOUNTER — TELEPHONE (OUTPATIENT)
Dept: INTERNAL MEDICINE | Facility: CLINIC | Age: 47
End: 2022-02-10
Payer: COMMERCIAL

## 2022-02-10 ENCOUNTER — LAB VISIT (OUTPATIENT)
Dept: LAB | Facility: HOSPITAL | Age: 47
End: 2022-02-10
Attending: INTERNAL MEDICINE
Payer: COMMERCIAL

## 2022-02-10 DIAGNOSIS — T75.3XXA SEA SICKNESS, INITIAL ENCOUNTER: Primary | ICD-10-CM

## 2022-02-10 DIAGNOSIS — Z20.822 ENCOUNTER FOR LABORATORY TESTING FOR COVID-19 VIRUS: ICD-10-CM

## 2022-02-10 LAB
SARS-COV-2 RNA RESP QL NAA+PROBE: NOT DETECTED
SARS-COV-2- CYCLE NUMBER: NORMAL

## 2022-02-10 PROCEDURE — U0005 INFEC AGEN DETEC AMPLI PROBE: HCPCS | Performed by: INTERNAL MEDICINE

## 2022-02-10 PROCEDURE — U0003 INFECTIOUS AGENT DETECTION BY NUCLEIC ACID (DNA OR RNA); SEVERE ACUTE RESPIRATORY SYNDROME CORONAVIRUS 2 (SARS-COV-2) (CORONAVIRUS DISEASE [COVID-19]), AMPLIFIED PROBE TECHNIQUE, MAKING USE OF HIGH THROUGHPUT TECHNOLOGIES AS DESCRIBED BY CMS-2020-01-R: HCPCS | Performed by: INTERNAL MEDICINE

## 2022-02-10 RX ORDER — SCOLOPAMINE TRANSDERMAL SYSTEM 1 MG/1
1 PATCH, EXTENDED RELEASE TRANSDERMAL
Qty: 4 PATCH | Refills: 0 | Status: SHIPPED | OUTPATIENT
Start: 2022-02-10 | End: 2022-08-17 | Stop reason: SDUPTHER

## 2022-02-10 NOTE — TELEPHONE ENCOUNTER
"Nora is going on a cruise/requests patches to prevent"sea sickness"  'Have erx'd in Scopolamine patches.  "

## 2022-03-10 ENCOUNTER — TELEPHONE (OUTPATIENT)
Dept: INTERNAL MEDICINE | Facility: CLINIC | Age: 47
End: 2022-03-10
Payer: COMMERCIAL

## 2022-03-10 RX ORDER — DEXTROAMPHETAMINE SACCHARATE, AMPHETAMINE ASPARTATE MONOHYDRATE, DEXTROAMPHETAMINE SULFATE AND AMPHETAMINE SULFATE 3.75; 3.75; 3.75; 3.75 MG/1; MG/1; MG/1; MG/1
15 CAPSULE, EXTENDED RELEASE ORAL EVERY MORNING
Qty: 30 CAPSULE | Refills: 0 | Status: SHIPPED | OUTPATIENT
Start: 2022-03-10 | End: 2022-05-01 | Stop reason: SDUPTHER

## 2022-05-03 ENCOUNTER — LAB VISIT (OUTPATIENT)
Dept: LAB | Facility: HOSPITAL | Age: 47
End: 2022-05-03
Attending: INTERNAL MEDICINE
Payer: COMMERCIAL

## 2022-05-03 ENCOUNTER — TELEPHONE (OUTPATIENT)
Dept: INTERNAL MEDICINE | Facility: CLINIC | Age: 47
End: 2022-05-03
Payer: COMMERCIAL

## 2022-05-03 DIAGNOSIS — Z00.00 WELLNESS EXAMINATION: Primary | ICD-10-CM

## 2022-05-03 DIAGNOSIS — Z00.00 WELLNESS EXAMINATION: ICD-10-CM

## 2022-05-03 LAB
ALBUMIN SERPL BCP-MCNC: 3.7 G/DL (ref 3.5–5.2)
ALP SERPL-CCNC: 63 U/L (ref 55–135)
ALT SERPL W/O P-5'-P-CCNC: 15 U/L (ref 10–44)
ANION GAP SERPL CALC-SCNC: 8 MMOL/L (ref 8–16)
AST SERPL-CCNC: 17 U/L (ref 10–40)
BASOPHILS # BLD AUTO: 0.03 K/UL (ref 0–0.2)
BASOPHILS NFR BLD: 0.6 % (ref 0–1.9)
BILIRUB SERPL-MCNC: 0.8 MG/DL (ref 0.1–1)
BUN SERPL-MCNC: 13 MG/DL (ref 6–20)
CALCIUM SERPL-MCNC: 9.2 MG/DL (ref 8.7–10.5)
CHLORIDE SERPL-SCNC: 105 MMOL/L (ref 95–110)
CHOLEST SERPL-MCNC: 198 MG/DL (ref 120–199)
CHOLEST/HDLC SERPL: 3.7 {RATIO} (ref 2–5)
CO2 SERPL-SCNC: 25 MMOL/L (ref 23–29)
CREAT SERPL-MCNC: 0.7 MG/DL (ref 0.5–1.4)
DIFFERENTIAL METHOD: NORMAL
EOSINOPHIL # BLD AUTO: 0.1 K/UL (ref 0–0.5)
EOSINOPHIL NFR BLD: 2.1 % (ref 0–8)
ERYTHROCYTE [DISTWIDTH] IN BLOOD BY AUTOMATED COUNT: 12.7 % (ref 11.5–14.5)
EST. GFR  (AFRICAN AMERICAN): >60 ML/MIN/1.73 M^2
EST. GFR  (NON AFRICAN AMERICAN): >60 ML/MIN/1.73 M^2
ESTIMATED AVG GLUCOSE: 108 MG/DL (ref 68–131)
GLUCOSE SERPL-MCNC: 88 MG/DL (ref 70–110)
HBA1C MFR BLD: 5.4 % (ref 4–5.6)
HCT VFR BLD AUTO: 39.1 % (ref 37–48.5)
HDLC SERPL-MCNC: 54 MG/DL (ref 40–75)
HDLC SERPL: 27.3 % (ref 20–50)
HGB BLD-MCNC: 12.6 G/DL (ref 12–16)
IMM GRANULOCYTES # BLD AUTO: 0.01 K/UL (ref 0–0.04)
IMM GRANULOCYTES NFR BLD AUTO: 0.2 % (ref 0–0.5)
LDLC SERPL CALC-MCNC: 131.2 MG/DL (ref 63–159)
LYMPHOCYTES # BLD AUTO: 2 K/UL (ref 1–4.8)
LYMPHOCYTES NFR BLD: 39.1 % (ref 18–48)
MCH RBC QN AUTO: 29.5 PG (ref 27–31)
MCHC RBC AUTO-ENTMCNC: 32.2 G/DL (ref 32–36)
MCV RBC AUTO: 92 FL (ref 82–98)
MONOCYTES # BLD AUTO: 0.4 K/UL (ref 0.3–1)
MONOCYTES NFR BLD: 7.9 % (ref 4–15)
NEUTROPHILS # BLD AUTO: 2.6 K/UL (ref 1.8–7.7)
NEUTROPHILS NFR BLD: 50.1 % (ref 38–73)
NONHDLC SERPL-MCNC: 144 MG/DL
NRBC BLD-RTO: 0 /100 WBC
PLATELET # BLD AUTO: 277 K/UL (ref 150–450)
PMV BLD AUTO: 9.7 FL (ref 9.2–12.9)
POTASSIUM SERPL-SCNC: 3.8 MMOL/L (ref 3.5–5.1)
PROT SERPL-MCNC: 7.3 G/DL (ref 6–8.4)
RBC # BLD AUTO: 4.27 M/UL (ref 4–5.4)
SODIUM SERPL-SCNC: 138 MMOL/L (ref 136–145)
TRIGL SERPL-MCNC: 64 MG/DL (ref 30–150)
TSH SERPL DL<=0.005 MIU/L-ACNC: 2.37 UIU/ML (ref 0.4–4)
WBC # BLD AUTO: 5.16 K/UL (ref 3.9–12.7)

## 2022-05-03 PROCEDURE — 83036 HEMOGLOBIN GLYCOSYLATED A1C: CPT | Performed by: INTERNAL MEDICINE

## 2022-05-03 PROCEDURE — 80061 LIPID PANEL: CPT | Performed by: INTERNAL MEDICINE

## 2022-05-03 PROCEDURE — 80053 COMPREHEN METABOLIC PANEL: CPT | Performed by: INTERNAL MEDICINE

## 2022-05-03 PROCEDURE — 36415 COLL VENOUS BLD VENIPUNCTURE: CPT | Performed by: INTERNAL MEDICINE

## 2022-05-03 PROCEDURE — 85025 COMPLETE CBC W/AUTO DIFF WBC: CPT | Performed by: INTERNAL MEDICINE

## 2022-05-03 PROCEDURE — 84443 ASSAY THYROID STIM HORMONE: CPT | Performed by: INTERNAL MEDICINE

## 2022-05-10 ENCOUNTER — OFFICE VISIT (OUTPATIENT)
Dept: INTERNAL MEDICINE | Facility: CLINIC | Age: 47
End: 2022-05-10
Payer: COMMERCIAL

## 2022-05-10 VITALS
DIASTOLIC BLOOD PRESSURE: 72 MMHG | OXYGEN SATURATION: 98 % | HEART RATE: 90 BPM | WEIGHT: 165.38 LBS | HEIGHT: 61 IN | BODY MASS INDEX: 31.23 KG/M2 | SYSTOLIC BLOOD PRESSURE: 126 MMHG

## 2022-05-10 DIAGNOSIS — R30.0 DYSURIA: ICD-10-CM

## 2022-05-10 DIAGNOSIS — Z01.419 NORMAL GYNECOLOGIC EXAMINATION: Primary | ICD-10-CM

## 2022-05-10 DIAGNOSIS — Z12.11 SCREENING FOR COLON CANCER: ICD-10-CM

## 2022-05-10 LAB
BILIRUB UR QL STRIP: NEGATIVE
CAOX CRY UR QL COMP ASSIST: NORMAL
CLARITY UR REFRACT.AUTO: ABNORMAL
COLOR UR AUTO: YELLOW
GLUCOSE UR QL STRIP: NEGATIVE
HGB UR QL STRIP: NEGATIVE
KETONES UR QL STRIP: ABNORMAL
LEUKOCYTE ESTERASE UR QL STRIP: NEGATIVE
MICROSCOPIC COMMENT: NORMAL
NITRITE UR QL STRIP: NEGATIVE
PH UR STRIP: 5 [PH] (ref 5–8)
PROT UR QL STRIP: NEGATIVE
RBC #/AREA URNS AUTO: 4 /HPF (ref 0–4)
SP GR UR STRIP: 1.03 (ref 1–1.03)
SQUAMOUS #/AREA URNS AUTO: 1 /HPF
URN SPEC COLLECT METH UR: ABNORMAL
WBC #/AREA URNS AUTO: 1 /HPF (ref 0–5)

## 2022-05-10 PROCEDURE — 99396 PR PREVENTIVE VISIT,EST,40-64: ICD-10-PCS | Mod: S$GLB,,, | Performed by: INTERNAL MEDICINE

## 2022-05-10 PROCEDURE — 1159F MED LIST DOCD IN RCRD: CPT | Mod: CPTII,S$GLB,, | Performed by: INTERNAL MEDICINE

## 2022-05-10 PROCEDURE — 1159F PR MEDICATION LIST DOCUMENTED IN MEDICAL RECORD: ICD-10-PCS | Mod: CPTII,S$GLB,, | Performed by: INTERNAL MEDICINE

## 2022-05-10 PROCEDURE — 3078F PR MOST RECENT DIASTOLIC BLOOD PRESSURE < 80 MM HG: ICD-10-PCS | Mod: CPTII,S$GLB,, | Performed by: INTERNAL MEDICINE

## 2022-05-10 PROCEDURE — 3044F HG A1C LEVEL LT 7.0%: CPT | Mod: CPTII,S$GLB,, | Performed by: INTERNAL MEDICINE

## 2022-05-10 PROCEDURE — 3044F PR MOST RECENT HEMOGLOBIN A1C LEVEL <7.0%: ICD-10-PCS | Mod: CPTII,S$GLB,, | Performed by: INTERNAL MEDICINE

## 2022-05-10 PROCEDURE — 99396 PREV VISIT EST AGE 40-64: CPT | Mod: S$GLB,,, | Performed by: INTERNAL MEDICINE

## 2022-05-10 PROCEDURE — 99999 PR PBB SHADOW E&M-EST. PATIENT-LVL IV: ICD-10-PCS | Mod: PBBFAC,,, | Performed by: INTERNAL MEDICINE

## 2022-05-10 PROCEDURE — 3008F PR BODY MASS INDEX (BMI) DOCUMENTED: ICD-10-PCS | Mod: CPTII,S$GLB,, | Performed by: INTERNAL MEDICINE

## 2022-05-10 PROCEDURE — 81001 URINALYSIS AUTO W/SCOPE: CPT | Performed by: INTERNAL MEDICINE

## 2022-05-10 PROCEDURE — 3074F PR MOST RECENT SYSTOLIC BLOOD PRESSURE < 130 MM HG: ICD-10-PCS | Mod: CPTII,S$GLB,, | Performed by: INTERNAL MEDICINE

## 2022-05-10 PROCEDURE — 87086 URINE CULTURE/COLONY COUNT: CPT | Performed by: INTERNAL MEDICINE

## 2022-05-10 PROCEDURE — 3008F BODY MASS INDEX DOCD: CPT | Mod: CPTII,S$GLB,, | Performed by: INTERNAL MEDICINE

## 2022-05-10 PROCEDURE — 99999 PR PBB SHADOW E&M-EST. PATIENT-LVL IV: CPT | Mod: PBBFAC,,, | Performed by: INTERNAL MEDICINE

## 2022-05-10 PROCEDURE — 3078F DIAST BP <80 MM HG: CPT | Mod: CPTII,S$GLB,, | Performed by: INTERNAL MEDICINE

## 2022-05-10 PROCEDURE — 3074F SYST BP LT 130 MM HG: CPT | Mod: CPTII,S$GLB,, | Performed by: INTERNAL MEDICINE

## 2022-05-10 RX ORDER — ONDANSETRON 4 MG/1
4 TABLET, FILM COATED ORAL EVERY 8 HOURS PRN
Qty: 20 TABLET | Refills: 0 | Status: SHIPPED | OUTPATIENT
Start: 2022-05-10 | End: 2023-08-15

## 2022-05-10 NOTE — PROGRESS NOTES
Subjective:       Patient ID: Nora Conklin is a 47 y.o. female.    Chief Complaint: Annual Exam    HPIPt is feeling well.  No CP or SOB. Labs reviewed. Migraines improved.  Review of Systems   Respiratory: Negative for shortness of breath (PND or orthopnea).    Cardiovascular: Negative for chest pain (arm pain or jaw pain).   Gastrointestinal: Negative for abdominal pain, diarrhea, nausea and vomiting.   Genitourinary: Negative for dysuria.   Neurological: Negative for seizures, syncope and headaches.       Objective:      Physical Exam  Constitutional:       General: She is not in acute distress.     Appearance: She is well-developed.   HENT:      Head: Normocephalic.   Neck:      Thyroid: No thyromegaly.      Vascular: No JVD.   Cardiovascular:      Rate and Rhythm: Normal rate and regular rhythm.      Heart sounds: Normal heart sounds. No murmur heard.    No friction rub. No gallop.   Pulmonary:      Effort: Pulmonary effort is normal.      Breath sounds: Normal breath sounds. No wheezing or rales.   Abdominal:      General: Bowel sounds are normal. There is no distension.      Palpations: Abdomen is soft. There is no mass.      Tenderness: There is no abdominal tenderness. There is no guarding or rebound.   Musculoskeletal:      Cervical back: Neck supple.   Lymphadenopathy:      Cervical: No cervical adenopathy.   Skin:     General: Skin is warm and dry.   Neurological:      Mental Status: She is alert and oriented to person, place, and time.      Deep Tendon Reflexes: Reflexes are normal and symmetric.   Psychiatric:         Behavior: Behavior normal.         Thought Content: Thought content normal.         Judgment: Judgment normal.         Assessment:       1. Normal gynecologic examination    2. Screening for colon cancer    3. Dysuria        Plan:   Normal gynecologic examination  -     Ambulatory referral/consult to Gynecology; Future; Expected date: 05/17/2022    Screening for colon cancer  -      Fecal Immunochemical Test (iFOBT); Future; Expected date: 05/10/2022    Dysuria  -     Urinalysis  -     Urine culture    Other orders  -     linaCLOtide (LINZESS) 72 mcg Cap capsule; Take 1 capsule (72 mcg total) by mouth before breakfast.  Dispense: 30 capsule; Refill: 1 - for chronic constipation  -     ondansetron (ZOFRAN) 4 MG tablet; Take 1 tablet (4 mg total) by mouth every 8 (eight) hours as needed for Nausea with migraines  Dispense: 20 tablet; Refill: 0

## 2022-05-11 ENCOUNTER — LAB VISIT (OUTPATIENT)
Dept: LAB | Facility: HOSPITAL | Age: 47
End: 2022-05-11
Attending: INTERNAL MEDICINE
Payer: COMMERCIAL

## 2022-05-11 DIAGNOSIS — Z12.11 SCREENING FOR COLON CANCER: ICD-10-CM

## 2022-05-11 LAB — BACTERIA UR CULT: NO GROWTH

## 2022-05-11 PROCEDURE — 82274 ASSAY TEST FOR BLOOD FECAL: CPT | Performed by: INTERNAL MEDICINE

## 2022-05-17 LAB — HEMOCCULT STL QL IA: NEGATIVE

## 2022-06-30 ENCOUNTER — TELEPHONE (OUTPATIENT)
Dept: INTERNAL MEDICINE | Facility: CLINIC | Age: 47
End: 2022-06-30

## 2022-06-30 ENCOUNTER — LAB VISIT (OUTPATIENT)
Dept: LAB | Facility: HOSPITAL | Age: 47
End: 2022-06-30
Attending: INTERNAL MEDICINE
Payer: COMMERCIAL

## 2022-06-30 DIAGNOSIS — R00.0 TACHYCARDIA: Primary | ICD-10-CM

## 2022-06-30 DIAGNOSIS — R00.2 PALPITATIONS: Primary | ICD-10-CM

## 2022-06-30 DIAGNOSIS — R00.0 TACHYCARDIA: ICD-10-CM

## 2022-06-30 LAB
ALBUMIN SERPL BCP-MCNC: 3.5 G/DL (ref 3.5–5.2)
ALP SERPL-CCNC: 62 U/L (ref 55–135)
ALT SERPL W/O P-5'-P-CCNC: 10 U/L (ref 10–44)
ANION GAP SERPL CALC-SCNC: 9 MMOL/L (ref 8–16)
AST SERPL-CCNC: 16 U/L (ref 10–40)
BILIRUB SERPL-MCNC: 0.7 MG/DL (ref 0.1–1)
BUN SERPL-MCNC: 11 MG/DL (ref 6–20)
CALCIUM SERPL-MCNC: 9 MG/DL (ref 8.7–10.5)
CHLORIDE SERPL-SCNC: 105 MMOL/L (ref 95–110)
CO2 SERPL-SCNC: 25 MMOL/L (ref 23–29)
CREAT SERPL-MCNC: 0.8 MG/DL (ref 0.5–1.4)
EST. GFR  (AFRICAN AMERICAN): >60 ML/MIN/1.73 M^2
EST. GFR  (NON AFRICAN AMERICAN): >60 ML/MIN/1.73 M^2
GLUCOSE SERPL-MCNC: 95 MG/DL (ref 70–110)
POTASSIUM SERPL-SCNC: 3.2 MMOL/L (ref 3.5–5.1)
PROT SERPL-MCNC: 7.2 G/DL (ref 6–8.4)
SODIUM SERPL-SCNC: 139 MMOL/L (ref 136–145)
TSH SERPL DL<=0.005 MIU/L-ACNC: 2.05 UIU/ML (ref 0.4–4)

## 2022-06-30 PROCEDURE — 85025 COMPLETE CBC W/AUTO DIFF WBC: CPT | Performed by: INTERNAL MEDICINE

## 2022-06-30 PROCEDURE — 84443 ASSAY THYROID STIM HORMONE: CPT | Performed by: INTERNAL MEDICINE

## 2022-06-30 PROCEDURE — 36415 COLL VENOUS BLD VENIPUNCTURE: CPT | Performed by: INTERNAL MEDICINE

## 2022-06-30 PROCEDURE — 80053 COMPREHEN METABOLIC PANEL: CPT | Performed by: INTERNAL MEDICINE

## 2022-06-30 NOTE — TELEPHONE ENCOUNTER
Pt with palpitations - has sinus tach on  - has been drinking more coffee - not taking adderall since school finished - not drinking many fluids.  No CP , no SOB, no weakness or dizziness with palpitations.  No fever or chills,  No cough, no N/V/D.  Will check lab - hydrate - UC or ED for worsening symptoms.

## 2022-07-01 LAB
BASOPHILS # BLD AUTO: 0.04 K/UL (ref 0–0.2)
BASOPHILS NFR BLD: 0.6 % (ref 0–1.9)
DIFFERENTIAL METHOD: NORMAL
EOSINOPHIL # BLD AUTO: 0.2 K/UL (ref 0–0.5)
EOSINOPHIL NFR BLD: 2.6 % (ref 0–8)
ERYTHROCYTE [DISTWIDTH] IN BLOOD BY AUTOMATED COUNT: 12.3 % (ref 11.5–14.5)
HCT VFR BLD AUTO: 38.3 % (ref 37–48.5)
HGB BLD-MCNC: 12.6 G/DL (ref 12–16)
IMM GRANULOCYTES # BLD AUTO: 0.02 K/UL (ref 0–0.04)
IMM GRANULOCYTES NFR BLD AUTO: 0.3 % (ref 0–0.5)
LYMPHOCYTES # BLD AUTO: 3.1 K/UL (ref 1–4.8)
LYMPHOCYTES NFR BLD: 44.3 % (ref 18–48)
MCH RBC QN AUTO: 30.8 PG (ref 27–31)
MCHC RBC AUTO-ENTMCNC: 32.9 G/DL (ref 32–36)
MCV RBC AUTO: 94 FL (ref 82–98)
MONOCYTES # BLD AUTO: 0.6 K/UL (ref 0.3–1)
MONOCYTES NFR BLD: 9.2 % (ref 4–15)
NEUTROPHILS # BLD AUTO: 3 K/UL (ref 1.8–7.7)
NEUTROPHILS NFR BLD: 43 % (ref 38–73)
NRBC BLD-RTO: 0 /100 WBC
PLATELET # BLD AUTO: 302 K/UL (ref 150–450)
PMV BLD AUTO: 10.3 FL (ref 9.2–12.9)
RBC # BLD AUTO: 4.09 M/UL (ref 4–5.4)
WBC # BLD AUTO: 6.96 K/UL (ref 3.9–12.7)

## 2022-07-02 ENCOUNTER — TELEPHONE (OUTPATIENT)
Dept: INTERNAL MEDICINE | Facility: CLINIC | Age: 47
End: 2022-07-02

## 2022-07-02 DIAGNOSIS — I10 HYPERTENSION, UNSPECIFIED TYPE: Primary | ICD-10-CM

## 2022-07-08 ENCOUNTER — LAB VISIT (OUTPATIENT)
Dept: LAB | Facility: HOSPITAL | Age: 47
End: 2022-07-08
Attending: INTERNAL MEDICINE
Payer: COMMERCIAL

## 2022-07-08 DIAGNOSIS — I10 HYPERTENSION, UNSPECIFIED TYPE: ICD-10-CM

## 2022-07-08 LAB
ANION GAP SERPL CALC-SCNC: 6 MMOL/L (ref 8–16)
BUN SERPL-MCNC: 12 MG/DL (ref 6–20)
CALCIUM SERPL-MCNC: 8.4 MG/DL (ref 8.7–10.5)
CHLORIDE SERPL-SCNC: 105 MMOL/L (ref 95–110)
CO2 SERPL-SCNC: 24 MMOL/L (ref 23–29)
CREAT SERPL-MCNC: 0.7 MG/DL (ref 0.5–1.4)
EST. GFR  (AFRICAN AMERICAN): >60 ML/MIN/1.73 M^2
EST. GFR  (NON AFRICAN AMERICAN): >60 ML/MIN/1.73 M^2
GLUCOSE SERPL-MCNC: 86 MG/DL (ref 70–110)
POTASSIUM SERPL-SCNC: 3.6 MMOL/L (ref 3.5–5.1)
SODIUM SERPL-SCNC: 135 MMOL/L (ref 136–145)

## 2022-07-08 PROCEDURE — 80048 BASIC METABOLIC PNL TOTAL CA: CPT | Performed by: INTERNAL MEDICINE

## 2022-07-08 PROCEDURE — 36415 COLL VENOUS BLD VENIPUNCTURE: CPT | Performed by: INTERNAL MEDICINE

## 2022-08-17 ENCOUNTER — TELEPHONE (OUTPATIENT)
Dept: INTERNAL MEDICINE | Facility: CLINIC | Age: 47
End: 2022-08-17

## 2022-08-17 DIAGNOSIS — T75.3XXA SEA SICKNESS, INITIAL ENCOUNTER: ICD-10-CM

## 2022-08-17 RX ORDER — SCOLOPAMINE TRANSDERMAL SYSTEM 1 MG/1
1 PATCH, EXTENDED RELEASE TRANSDERMAL
Qty: 4 PATCH | Refills: 0 | Status: SHIPPED | OUTPATIENT
Start: 2022-08-17 | End: 2023-08-15

## 2022-08-30 ENCOUNTER — TELEPHONE (OUTPATIENT)
Dept: INTERNAL MEDICINE | Facility: CLINIC | Age: 47
End: 2022-08-30
Payer: COMMERCIAL

## 2022-08-30 RX ORDER — IBUPROFEN 800 MG/1
800 TABLET ORAL 3 TIMES DAILY PRN
Qty: 90 TABLET | Refills: 1 | Status: SHIPPED | OUTPATIENT
Start: 2022-08-30 | End: 2023-08-15 | Stop reason: SDUPTHER

## 2022-09-22 ENCOUNTER — OFFICE VISIT (OUTPATIENT)
Dept: OBSTETRICS AND GYNECOLOGY | Facility: CLINIC | Age: 47
End: 2022-09-22
Payer: COMMERCIAL

## 2022-09-22 VITALS
HEIGHT: 61 IN | SYSTOLIC BLOOD PRESSURE: 102 MMHG | WEIGHT: 165.13 LBS | DIASTOLIC BLOOD PRESSURE: 64 MMHG | BODY MASS INDEX: 31.18 KG/M2

## 2022-09-22 DIAGNOSIS — R19.00 PELVIC FULLNESS: ICD-10-CM

## 2022-09-22 DIAGNOSIS — Z01.419 NORMAL GYNECOLOGIC EXAMINATION: Primary | ICD-10-CM

## 2022-09-22 PROCEDURE — 87624 HPV HI-RISK TYP POOLED RSLT: CPT | Performed by: OBSTETRICS & GYNECOLOGY

## 2022-09-22 PROCEDURE — 88141 CYTOPATH C/V INTERPRET: CPT | Mod: ,,, | Performed by: PATHOLOGY

## 2022-09-22 PROCEDURE — 1159F MED LIST DOCD IN RCRD: CPT | Mod: CPTII,S$GLB,, | Performed by: OBSTETRICS & GYNECOLOGY

## 2022-09-22 PROCEDURE — 3074F PR MOST RECENT SYSTOLIC BLOOD PRESSURE < 130 MM HG: ICD-10-PCS | Mod: CPTII,S$GLB,, | Performed by: OBSTETRICS & GYNECOLOGY

## 2022-09-22 PROCEDURE — 3078F DIAST BP <80 MM HG: CPT | Mod: CPTII,S$GLB,, | Performed by: OBSTETRICS & GYNECOLOGY

## 2022-09-22 PROCEDURE — 1160F RVW MEDS BY RX/DR IN RCRD: CPT | Mod: CPTII,S$GLB,, | Performed by: OBSTETRICS & GYNECOLOGY

## 2022-09-22 PROCEDURE — 1159F PR MEDICATION LIST DOCUMENTED IN MEDICAL RECORD: ICD-10-PCS | Mod: CPTII,S$GLB,, | Performed by: OBSTETRICS & GYNECOLOGY

## 2022-09-22 PROCEDURE — 3078F PR MOST RECENT DIASTOLIC BLOOD PRESSURE < 80 MM HG: ICD-10-PCS | Mod: CPTII,S$GLB,, | Performed by: OBSTETRICS & GYNECOLOGY

## 2022-09-22 PROCEDURE — 99999 PR PBB SHADOW E&M-EST. PATIENT-LVL IV: CPT | Mod: PBBFAC,,, | Performed by: OBSTETRICS & GYNECOLOGY

## 2022-09-22 PROCEDURE — 3044F HG A1C LEVEL LT 7.0%: CPT | Mod: CPTII,S$GLB,, | Performed by: OBSTETRICS & GYNECOLOGY

## 2022-09-22 PROCEDURE — 3074F SYST BP LT 130 MM HG: CPT | Mod: CPTII,S$GLB,, | Performed by: OBSTETRICS & GYNECOLOGY

## 2022-09-22 PROCEDURE — 99999 PR PBB SHADOW E&M-EST. PATIENT-LVL IV: ICD-10-PCS | Mod: PBBFAC,,, | Performed by: OBSTETRICS & GYNECOLOGY

## 2022-09-22 PROCEDURE — 88141 PR  CYTOPATH CERV/VAG INTERPRET: ICD-10-PCS | Mod: ,,, | Performed by: PATHOLOGY

## 2022-09-22 PROCEDURE — 88175 CYTOPATH C/V AUTO FLUID REDO: CPT | Performed by: PATHOLOGY

## 2022-09-22 PROCEDURE — 3044F PR MOST RECENT HEMOGLOBIN A1C LEVEL <7.0%: ICD-10-PCS | Mod: CPTII,S$GLB,, | Performed by: OBSTETRICS & GYNECOLOGY

## 2022-09-22 PROCEDURE — 1160F PR REVIEW ALL MEDS BY PRESCRIBER/CLIN PHARMACIST DOCUMENTED: ICD-10-PCS | Mod: CPTII,S$GLB,, | Performed by: OBSTETRICS & GYNECOLOGY

## 2022-09-22 PROCEDURE — 99396 PR PREVENTIVE VISIT,EST,40-64: ICD-10-PCS | Mod: S$GLB,,, | Performed by: OBSTETRICS & GYNECOLOGY

## 2022-09-22 PROCEDURE — 3008F BODY MASS INDEX DOCD: CPT | Mod: CPTII,S$GLB,, | Performed by: OBSTETRICS & GYNECOLOGY

## 2022-09-22 PROCEDURE — 99396 PREV VISIT EST AGE 40-64: CPT | Mod: S$GLB,,, | Performed by: OBSTETRICS & GYNECOLOGY

## 2022-09-22 PROCEDURE — 3008F PR BODY MASS INDEX (BMI) DOCUMENTED: ICD-10-PCS | Mod: CPTII,S$GLB,, | Performed by: OBSTETRICS & GYNECOLOGY

## 2022-09-22 NOTE — PROGRESS NOTES
"CC: Well woman exam    Nora Conklin is a 47 y.o. female  presents for a well woman exam.  She denies any vaginal discharge or issues.        Past Medical History:   Diagnosis Date    Medical history non-contributory     Migraine headache        Past Surgical History:   Procedure Laterality Date     SECTION         OB History    Para Term  AB Living   3 3 3     3   SAB IAB Ectopic Multiple Live Births                  # Outcome Date GA Lbr Jamar/2nd Weight Sex Delivery Anes PTL Lv   3 Term            2 Term            1 Term                Family History   Problem Relation Age of Onset    Cancer Maternal Grandmother         lung     Cervical cancer Mother     Emphysema Mother     Hypertension Sister     Valvular heart disease Maternal Aunt     Breast cancer Neg Hx     Colon cancer Neg Hx     Ovarian cancer Neg Hx        Social History     Tobacco Use    Smoking status: Never     Passive exposure: Never    Smokeless tobacco: Never   Substance Use Topics    Alcohol use: Yes     Alcohol/week: 0.0 standard drinks     Comment: rare    Drug use: No       /64   Ht 5' 1" (1.549 m)   Wt 74.9 kg (165 lb 2 oz)   LMP 2022 (Approximate)   BMI 31.20 kg/m²     ROS:  GENERAL: Denies weight gain or weight loss. Feeling well overall.   SKIN: Denies rash or lesions.   HEAD: Denies head injury or headache.   NODES: Denies enlarged lymph nodes.   CHEST: Denies chest pain or shortness of breath.   CARDIOVASCULAR: Denies palpitations or left sided chest pain.   ABDOMEN: No abdominal pain, constipation, diarrhea, nausea, vomiting or rectal bleeding.   URINARY: No frequency, dysuria, hematuria, or burning on urination.  REPRODUCTIVE: See HPI.   BREASTS: The patient performs breast self-examination and denies pain, lumps, or nipple discharge.   HEMATOLOGIC: No easy bruisability or excessive bleeding.  MUSCULOSKELETAL: Denies joint pain or swelling.   NEUROLOGIC: Denies syncope or weakness. "   PSYCHIATRIC: Denies depression, anxiety or mood swings.    Physical Exam:    APPEARANCE: Well nourished, well developed, in no acute distress.  AFFECT: WNL, alert and oriented x 3  SKIN: No acne or hirsutism  NECK: Neck symmetric without masses or thyromegaly  NODES: No inguinal, cervical, axillary, or femoral lymph node enlargement  CHEST: Good respiratory effect  ABDOMEN: Soft.  No tenderness or masses.  No hepatosplenomegaly.  No hernias.  BREASTS: Symmetrical, no skin changes or visible lesions.  No palpable masses, nipple discharge bilaterally.  PELVIC: Normal external genitalia without lesions.  Normal hair distribution.  Adequate perineal body, normal urethral meatus.  Vagina moist and well rugated without lesions or discharge.  Cervix pink, friable, bled with pap, without lesions, discharge or tenderness.  No significant cystocele or rectocele.  Bimanual exam shows uterus to be globular,  10 wk size, regular, mobile and nontender.  Cervix felt dilated, possible fibroid?   Adnexa without masses or tenderness.    EXTREMITIES: No edema.    ASSESSMENT AND PLAN  1. Normal gynecologic examination  Ambulatory referral/consult to Gynecology    Liquid-Based Pap Smear, Screening      2. Pelvic fullness  US Pelvis Comp with Transvag NON-OB (xpd          Patient was counseled today on A.C.S. Pap guidelines and recommendations for yearly pelvic exams, mammograms and monthly self breast exams; to see her PCP for other health maintenance.     No follow-ups on file.

## 2022-09-29 LAB
FINAL PATHOLOGIC DIAGNOSIS: ABNORMAL
Lab: ABNORMAL

## 2022-09-30 ENCOUNTER — PATIENT MESSAGE (OUTPATIENT)
Dept: OBSTETRICS AND GYNECOLOGY | Facility: CLINIC | Age: 47
End: 2022-09-30
Payer: COMMERCIAL

## 2022-10-04 ENCOUNTER — TELEPHONE (OUTPATIENT)
Dept: OBSTETRICS AND GYNECOLOGY | Facility: CLINIC | Age: 47
End: 2022-10-04
Payer: COMMERCIAL

## 2022-10-04 ENCOUNTER — PATIENT MESSAGE (OUTPATIENT)
Dept: INTERNAL MEDICINE | Facility: CLINIC | Age: 47
End: 2022-10-04
Payer: COMMERCIAL

## 2022-10-04 ENCOUNTER — PATIENT MESSAGE (OUTPATIENT)
Dept: OBSTETRICS AND GYNECOLOGY | Facility: CLINIC | Age: 47
End: 2022-10-04
Payer: COMMERCIAL

## 2022-10-04 LAB
HPV HR 12 DNA SPEC QL NAA+PROBE: POSITIVE
HPV16 AG SPEC QL: NEGATIVE
HPV18 DNA SPEC QL NAA+PROBE: NEGATIVE

## 2022-10-05 ENCOUNTER — TELEPHONE (OUTPATIENT)
Dept: INTERNAL MEDICINE | Facility: CLINIC | Age: 47
End: 2022-10-05
Payer: COMMERCIAL

## 2022-10-05 ENCOUNTER — HOSPITAL ENCOUNTER (OUTPATIENT)
Dept: RADIOLOGY | Facility: OTHER | Age: 47
Discharge: HOME | End: 2022-10-05
Attending: OBSTETRICS & GYNECOLOGY
Payer: COMMERCIAL

## 2022-10-05 DIAGNOSIS — R19.00 PELVIC FULLNESS: ICD-10-CM

## 2022-10-05 DIAGNOSIS — Z00.00 WELLNESS EXAMINATION: Primary | ICD-10-CM

## 2022-10-05 PROCEDURE — 76830 US PELVIS COMP WITH TRANSVAG NON-OB (XPD): ICD-10-PCS | Mod: 26,,, | Performed by: RADIOLOGY

## 2022-10-05 PROCEDURE — 76856 US EXAM PELVIC COMPLETE: CPT | Mod: 26,,, | Performed by: RADIOLOGY

## 2022-10-05 PROCEDURE — 76856 US PELVIS COMP WITH TRANSVAG NON-OB (XPD): ICD-10-PCS | Mod: 26,,, | Performed by: RADIOLOGY

## 2022-10-05 PROCEDURE — 76830 TRANSVAGINAL US NON-OB: CPT | Mod: 26,,, | Performed by: RADIOLOGY

## 2022-10-05 PROCEDURE — 76856 US EXAM PELVIC COMPLETE: CPT | Mod: TC

## 2022-10-05 NOTE — TELEPHONE ENCOUNTER
The pap shows atypical cells with other high risk HPV present.  I would recommend a repeat pap in one year AP

## 2022-10-06 ENCOUNTER — LAB VISIT (OUTPATIENT)
Dept: LAB | Facility: OTHER | Age: 47
End: 2022-10-06
Attending: INTERNAL MEDICINE
Payer: COMMERCIAL

## 2022-10-06 DIAGNOSIS — Z00.00 WELLNESS EXAMINATION: ICD-10-CM

## 2022-10-06 PROCEDURE — 86735 MUMPS ANTIBODY: CPT | Performed by: INTERNAL MEDICINE

## 2022-10-06 PROCEDURE — 86787 VARICELLA-ZOSTER ANTIBODY: CPT | Performed by: INTERNAL MEDICINE

## 2022-10-06 PROCEDURE — 36415 COLL VENOUS BLD VENIPUNCTURE: CPT | Performed by: INTERNAL MEDICINE

## 2022-10-06 PROCEDURE — 86762 RUBELLA ANTIBODY: CPT | Performed by: INTERNAL MEDICINE

## 2022-10-06 PROCEDURE — 86706 HEP B SURFACE ANTIBODY: CPT | Performed by: INTERNAL MEDICINE

## 2022-10-06 PROCEDURE — 86765 RUBEOLA ANTIBODY: CPT | Performed by: INTERNAL MEDICINE

## 2022-10-07 LAB
RUBV IGG SER-ACNC: 70.9 IU/ML
RUBV IGG SER-IMP: REACTIVE

## 2022-10-10 LAB
HBV SURFACE AB SER QL IA: POSITIVE
HBV SURFACE AB SERPL IA-ACNC: 32 MIU/ML
MUMPS IGG INTERPRETATION: POSITIVE
MUMPS IGG SCREEN: 2.74 ISR (ref 0–0.9)
RUBEOLA IGG ANTIBODY: 2.52 ISR (ref 0–0.9)
RUBEOLA INTERPRETATION: POSITIVE
VARICELLA INTERPRETATION: POSITIVE
VARICELLA ZOSTER IGG: 2.56 ISR (ref 0–0.9)

## 2022-10-17 RX ORDER — LUBIPROSTONE 24 UG/1
24 CAPSULE ORAL 2 TIMES DAILY
Qty: 180 CAPSULE | Refills: 1 | Status: SHIPPED | OUTPATIENT
Start: 2022-10-17 | End: 2023-08-15 | Stop reason: SDUPTHER

## 2022-10-17 RX ORDER — LUBIPROSTONE 24 UG/1
24 CAPSULE ORAL 2 TIMES DAILY
COMMUNITY
End: 2022-10-17 | Stop reason: SDUPTHER

## 2022-11-02 ENCOUNTER — TELEPHONE (OUTPATIENT)
Dept: INTERNAL MEDICINE | Facility: CLINIC | Age: 47
End: 2022-11-02

## 2022-11-14 ENCOUNTER — OFFICE VISIT (OUTPATIENT)
Dept: INTERNAL MEDICINE | Facility: CLINIC | Age: 47
End: 2022-11-14
Payer: COMMERCIAL

## 2022-11-14 VITALS
OXYGEN SATURATION: 98 % | BODY MASS INDEX: 32.46 KG/M2 | HEIGHT: 61 IN | SYSTOLIC BLOOD PRESSURE: 124 MMHG | WEIGHT: 171.94 LBS | HEART RATE: 85 BPM | DIASTOLIC BLOOD PRESSURE: 76 MMHG

## 2022-11-14 DIAGNOSIS — G43.009 MIGRAINE WITHOUT AURA AND WITHOUT STATUS MIGRAINOSUS, NOT INTRACTABLE: Primary | ICD-10-CM

## 2022-11-14 PROCEDURE — 99999 PR PBB SHADOW E&M-EST. PATIENT-LVL IV: ICD-10-PCS | Mod: PBBFAC,,, | Performed by: INTERNAL MEDICINE

## 2022-11-14 PROCEDURE — 1159F MED LIST DOCD IN RCRD: CPT | Mod: CPTII,S$GLB,, | Performed by: INTERNAL MEDICINE

## 2022-11-14 PROCEDURE — 3078F DIAST BP <80 MM HG: CPT | Mod: CPTII,S$GLB,, | Performed by: INTERNAL MEDICINE

## 2022-11-14 PROCEDURE — 3044F HG A1C LEVEL LT 7.0%: CPT | Mod: CPTII,S$GLB,, | Performed by: INTERNAL MEDICINE

## 2022-11-14 PROCEDURE — 3074F SYST BP LT 130 MM HG: CPT | Mod: CPTII,S$GLB,, | Performed by: INTERNAL MEDICINE

## 2022-11-14 PROCEDURE — 3008F PR BODY MASS INDEX (BMI) DOCUMENTED: ICD-10-PCS | Mod: CPTII,S$GLB,, | Performed by: INTERNAL MEDICINE

## 2022-11-14 PROCEDURE — 3074F PR MOST RECENT SYSTOLIC BLOOD PRESSURE < 130 MM HG: ICD-10-PCS | Mod: CPTII,S$GLB,, | Performed by: INTERNAL MEDICINE

## 2022-11-14 PROCEDURE — 3044F PR MOST RECENT HEMOGLOBIN A1C LEVEL <7.0%: ICD-10-PCS | Mod: CPTII,S$GLB,, | Performed by: INTERNAL MEDICINE

## 2022-11-14 PROCEDURE — 3008F BODY MASS INDEX DOCD: CPT | Mod: CPTII,S$GLB,, | Performed by: INTERNAL MEDICINE

## 2022-11-14 PROCEDURE — 99999 PR PBB SHADOW E&M-EST. PATIENT-LVL IV: CPT | Mod: PBBFAC,,, | Performed by: INTERNAL MEDICINE

## 2022-11-14 PROCEDURE — 3078F PR MOST RECENT DIASTOLIC BLOOD PRESSURE < 80 MM HG: ICD-10-PCS | Mod: CPTII,S$GLB,, | Performed by: INTERNAL MEDICINE

## 2022-11-14 PROCEDURE — 99213 PR OFFICE/OUTPT VISIT, EST, LEVL III, 20-29 MIN: ICD-10-PCS | Mod: S$GLB,,, | Performed by: INTERNAL MEDICINE

## 2022-11-14 PROCEDURE — 1159F PR MEDICATION LIST DOCUMENTED IN MEDICAL RECORD: ICD-10-PCS | Mod: CPTII,S$GLB,, | Performed by: INTERNAL MEDICINE

## 2022-11-14 PROCEDURE — 99213 OFFICE O/P EST LOW 20 MIN: CPT | Mod: S$GLB,,, | Performed by: INTERNAL MEDICINE

## 2022-11-14 NOTE — PROGRESS NOTES
Subjective:       Patient ID: Nora Conklin is a 47 y.o. female.    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  No CP or SOB.    Review of Systems   Respiratory:  Negative for shortness of breath (PND or orthopnea).    Cardiovascular:  Negative for chest pain (arm pain or jaw pain).   Gastrointestinal:  Negative for abdominal pain, diarrhea, nausea and vomiting.   Genitourinary:  Negative for dysuria.   Neurological:  Negative for seizures, syncope and headaches.     Objective:      Physical Exam  Constitutional:       General: She is not in acute distress.     Appearance: She is well-developed.   HENT:      Head: Normocephalic.   Eyes:      Pupils: Pupils are equal, round, and reactive to light.   Neck:      Thyroid: No thyromegaly.      Vascular: No JVD.   Cardiovascular:      Rate and Rhythm: Normal rate and regular rhythm.      Heart sounds: Normal heart sounds. No murmur heard.    No friction rub. No gallop.   Pulmonary:      Effort: Pulmonary effort is normal.      Breath sounds: Normal breath sounds. No wheezing or rales.   Abdominal:      General: Bowel sounds are normal. There is no distension.      Palpations: Abdomen is soft. There is no mass.      Tenderness: There is no abdominal tenderness. There is no guarding or rebound.   Musculoskeletal:      Cervical back: Neck supple.   Lymphadenopathy:      Cervical: No cervical adenopathy.   Skin:     General: Skin is warm and dry.   Neurological:      Mental Status: She is alert and oriented to person, place, and time.      Deep Tendon Reflexes: Reflexes are normal and symmetric.   Psychiatric:         Behavior: Behavior normal.         Thought Content: Thought content normal.         Judgment: Judgment normal.       Assessment:       No diagnosis found.    Plan:   Migraine without aura and without status migrainosus, not intractable   Controlled - continue current meds

## 2022-12-05 ENCOUNTER — LAB VISIT (OUTPATIENT)
Dept: LAB | Facility: OTHER | Age: 47
End: 2022-12-05
Attending: INTERNAL MEDICINE
Payer: COMMERCIAL

## 2022-12-05 DIAGNOSIS — Z00.00 WELLNESS EXAMINATION: ICD-10-CM

## 2022-12-05 PROCEDURE — 86480 TB TEST CELL IMMUN MEASURE: CPT | Performed by: INTERNAL MEDICINE

## 2022-12-06 LAB
GAMMA INTERFERON BACKGROUND BLD IA-ACNC: 0.05 IU/ML
M TB IFN-G CD4+ BCKGRND COR BLD-ACNC: 0.01 IU/ML
MITOGEN IGNF BCKGRD COR BLD-ACNC: 9.27 IU/ML
TB GOLD PLUS: NEGATIVE
TB2 - NIL: 0.02 IU/ML

## 2023-01-13 NOTE — TELEPHONE ENCOUNTER
No new care gaps identified.  [facility] Embedded Care Gaps. Reference number: [id number]. 1/13/2023   7:56:38 AM CST

## 2023-01-17 RX ORDER — DEXTROAMPHETAMINE SACCHARATE, AMPHETAMINE ASPARTATE MONOHYDRATE, DEXTROAMPHETAMINE SULFATE AND AMPHETAMINE SULFATE 3.75; 3.75; 3.75; 3.75 MG/1; MG/1; MG/1; MG/1
15 CAPSULE, EXTENDED RELEASE ORAL EVERY MORNING
Qty: 30 CAPSULE | Refills: 0 | Status: SHIPPED | OUTPATIENT
Start: 2023-01-17 | End: 2023-06-13 | Stop reason: SDUPTHER

## 2023-04-13 ENCOUNTER — PATIENT MESSAGE (OUTPATIENT)
Dept: ADMINISTRATIVE | Facility: HOSPITAL | Age: 48
End: 2023-04-13
Payer: COMMERCIAL

## 2023-05-29 ENCOUNTER — TELEPHONE (OUTPATIENT)
Dept: INTERNAL MEDICINE | Facility: CLINIC | Age: 48
End: 2023-05-29
Payer: COMMERCIAL

## 2023-05-29 RX ORDER — RIMEGEPANT SULFATE 75 MG/75MG
TABLET, ORALLY DISINTEGRATING ORAL
Qty: 30 TABLET | Refills: 1 | Status: SHIPPED | OUTPATIENT
Start: 2023-05-29 | End: 2024-02-19 | Stop reason: SDUPTHER

## 2023-06-01 ENCOUNTER — TELEPHONE (OUTPATIENT)
Dept: PHARMACY | Facility: CLINIC | Age: 48
End: 2023-06-01
Payer: COMMERCIAL

## 2023-06-13 RX ORDER — DEXTROAMPHETAMINE SACCHARATE, AMPHETAMINE ASPARTATE MONOHYDRATE, DEXTROAMPHETAMINE SULFATE AND AMPHETAMINE SULFATE 3.75; 3.75; 3.75; 3.75 MG/1; MG/1; MG/1; MG/1
15 CAPSULE, EXTENDED RELEASE ORAL EVERY MORNING
Qty: 30 CAPSULE | Refills: 0 | Status: SHIPPED | OUTPATIENT
Start: 2023-06-13 | End: 2023-08-15 | Stop reason: SDUPTHER

## 2023-06-13 NOTE — TELEPHONE ENCOUNTER
No care due was identified.  [facility] Embedded Care Due Messages. Reference number: [id number].   6/13/2023 6:09:06 AM CDT

## 2023-06-21 ENCOUNTER — OFFICE VISIT (OUTPATIENT)
Dept: OBSTETRICS AND GYNECOLOGY | Facility: CLINIC | Age: 48
End: 2023-06-21
Payer: COMMERCIAL

## 2023-06-21 VITALS
WEIGHT: 165.13 LBS | HEIGHT: 61 IN | BODY MASS INDEX: 31.18 KG/M2 | SYSTOLIC BLOOD PRESSURE: 124 MMHG | DIASTOLIC BLOOD PRESSURE: 74 MMHG

## 2023-06-21 DIAGNOSIS — N89.8 VAGINAL IRRITATION: Primary | ICD-10-CM

## 2023-06-21 LAB
C TRACH DNA SPEC QL NAA+PROBE: NOT DETECTED
N GONORRHOEA DNA SPEC QL NAA+PROBE: NOT DETECTED

## 2023-06-21 PROCEDURE — 3078F PR MOST RECENT DIASTOLIC BLOOD PRESSURE < 80 MM HG: ICD-10-PCS | Mod: CPTII,S$GLB,, | Performed by: NURSE PRACTITIONER

## 2023-06-21 PROCEDURE — 87591 N.GONORRHOEAE DNA AMP PROB: CPT | Performed by: NURSE PRACTITIONER

## 2023-06-21 PROCEDURE — 81514 NFCT DS BV&VAGINITIS DNA ALG: CPT | Performed by: NURSE PRACTITIONER

## 2023-06-21 PROCEDURE — 3078F DIAST BP <80 MM HG: CPT | Mod: CPTII,S$GLB,, | Performed by: NURSE PRACTITIONER

## 2023-06-21 PROCEDURE — 99213 PR OFFICE/OUTPT VISIT, EST, LEVL III, 20-29 MIN: ICD-10-PCS | Mod: S$GLB,,, | Performed by: NURSE PRACTITIONER

## 2023-06-21 PROCEDURE — 3008F PR BODY MASS INDEX (BMI) DOCUMENTED: ICD-10-PCS | Mod: CPTII,S$GLB,, | Performed by: NURSE PRACTITIONER

## 2023-06-21 PROCEDURE — 3008F BODY MASS INDEX DOCD: CPT | Mod: CPTII,S$GLB,, | Performed by: NURSE PRACTITIONER

## 2023-06-21 PROCEDURE — 1159F MED LIST DOCD IN RCRD: CPT | Mod: CPTII,S$GLB,, | Performed by: NURSE PRACTITIONER

## 2023-06-21 PROCEDURE — 99999 PR PBB SHADOW E&M-EST. PATIENT-LVL III: ICD-10-PCS | Mod: PBBFAC,,, | Performed by: NURSE PRACTITIONER

## 2023-06-21 PROCEDURE — 3074F SYST BP LT 130 MM HG: CPT | Mod: CPTII,S$GLB,, | Performed by: NURSE PRACTITIONER

## 2023-06-21 PROCEDURE — 3074F PR MOST RECENT SYSTOLIC BLOOD PRESSURE < 130 MM HG: ICD-10-PCS | Mod: CPTII,S$GLB,, | Performed by: NURSE PRACTITIONER

## 2023-06-21 PROCEDURE — 1159F PR MEDICATION LIST DOCUMENTED IN MEDICAL RECORD: ICD-10-PCS | Mod: CPTII,S$GLB,, | Performed by: NURSE PRACTITIONER

## 2023-06-21 PROCEDURE — 99999 PR PBB SHADOW E&M-EST. PATIENT-LVL III: CPT | Mod: PBBFAC,,, | Performed by: NURSE PRACTITIONER

## 2023-06-21 PROCEDURE — 99213 OFFICE O/P EST LOW 20 MIN: CPT | Mod: S$GLB,,, | Performed by: NURSE PRACTITIONER

## 2023-06-21 RX ORDER — FLUCONAZOLE 150 MG/1
150 TABLET ORAL ONCE
Qty: 2 TABLET | Refills: 1 | Status: SHIPPED | OUTPATIENT
Start: 2023-06-21 | End: 2023-06-21

## 2023-06-21 NOTE — PROGRESS NOTES
CC: Vaginal irritation     Nora Conklin is a 48 y.o. female  presents with complaint of vaginal irritation.  denies itching.  denies odor.  She states the discharge is clear.  Alleviating factors: OTC Vagicare. 1 new sexual partners.    Denies any fever or pelvic pain.     ROS:  GENERAL: No fever, chills, fatigability or weight loss.  VULVAR: No pain, no lesions and no itching.  VAGINAL: No relaxation, no itching, no discharge, no abnormal bleeding and no lesions.  ABDOMEN: No abdominal pain. Denies nausea. Denies vomiting. No diarrhea. No constipation  BREAST: Denies pain. No lumps. No discharge.  URINARY: No incontinence, no nocturia, no frequency and no dysuria.  CARDIOVASCULAR: No chest pain. No shortness of breath. No leg cramps.  NEUROLOGICAL: No headaches. No vision changes.    PHYSICAL EXAM:  VULVA: normal appearing vulva with no masses, tenderness or lesions   VAGINA: + erythema to introitus normal appearing vagina with normal color and + discharge, no lesions   CERVIX: normal appearing cervix without discharge or lesions   UTERUS: uterus is normal size, shape, consistency and nontender   ADNEXA: normal adnexa in size, nontender and no masses    ASSESSMENT and PLAN:    ICD-10-CM ICD-9-CM    1. Vaginal irritation  N89.8 623.9 C. trachomatis/N. gonorrhoeae by AMP DNA      VAGINOSIS SCREEN BY DNA PROBE      fluconazole (DIFLUCAN) 150 MG Tab          GCCT   Vaginosis cx  Diflucan for suspected yeast     Patient was counseled today on vaginitis prevention including :  a. avoiding feminine products such as deoderant soaps, body wash, bubble bath, douches, scented toilet paper, deoderant tampons or pads, feminine wipes, chronic pad use, etc.  b. avoiding other vulvovaginal irritants such as long hot baths, humidity, tight, synthetic clothing, chlorine and sitting around in wet bathing suits  c. wearing cotton underwear, avoiding thong underwear and no underwear to bed  d. taking showers instead of  baths and use a hair dryer on cool setting afterwards to dry  e. wearing cotton to exercise and shower immediately after exercise and change clothes  f. using polyurethane condoms without spermicide if sexually active and symptoms are triggered by intercourse    FOLLOW UP: PRN lack of improvement.    Rose Marie Chopra, FNP-C

## 2023-06-23 LAB
BACTERIAL VAGINOSIS DNA: POSITIVE
CANDIDA GLABRATA DNA: NEGATIVE
CANDIDA KRUSEI DNA: NEGATIVE
CANDIDA RRNA VAG QL PROBE: NEGATIVE
T VAGINALIS RRNA GENITAL QL PROBE: NEGATIVE

## 2023-06-26 ENCOUNTER — PATIENT MESSAGE (OUTPATIENT)
Dept: OBSTETRICS AND GYNECOLOGY | Facility: CLINIC | Age: 48
End: 2023-06-26
Payer: COMMERCIAL

## 2023-06-26 DIAGNOSIS — B96.89 BV (BACTERIAL VAGINOSIS): Primary | ICD-10-CM

## 2023-06-26 DIAGNOSIS — N76.0 BV (BACTERIAL VAGINOSIS): Primary | ICD-10-CM

## 2023-06-26 RX ORDER — METRONIDAZOLE 500 MG/1
500 TABLET ORAL 2 TIMES DAILY
Qty: 14 TABLET | Refills: 0 | Status: SHIPPED | OUTPATIENT
Start: 2023-06-26 | End: 2023-07-21

## 2023-07-20 DIAGNOSIS — N76.0 BV (BACTERIAL VAGINOSIS): ICD-10-CM

## 2023-07-20 DIAGNOSIS — B96.89 BV (BACTERIAL VAGINOSIS): ICD-10-CM

## 2023-07-21 RX ORDER — METRONIDAZOLE 500 MG/1
TABLET ORAL
Qty: 14 TABLET | Refills: 0 | Status: SHIPPED | OUTPATIENT
Start: 2023-07-21 | End: 2023-08-15

## 2023-08-15 ENCOUNTER — OFFICE VISIT (OUTPATIENT)
Dept: INTERNAL MEDICINE | Facility: CLINIC | Age: 48
End: 2023-08-15
Payer: COMMERCIAL

## 2023-08-15 ENCOUNTER — HOSPITAL ENCOUNTER (OUTPATIENT)
Dept: RADIOLOGY | Facility: OTHER | Age: 48
Discharge: HOME | End: 2023-08-15
Attending: INTERNAL MEDICINE
Payer: COMMERCIAL

## 2023-08-15 VITALS
BODY MASS INDEX: 32.51 KG/M2 | HEART RATE: 84 BPM | WEIGHT: 172.19 LBS | DIASTOLIC BLOOD PRESSURE: 74 MMHG | HEIGHT: 61 IN | OXYGEN SATURATION: 98 % | SYSTOLIC BLOOD PRESSURE: 122 MMHG

## 2023-08-15 DIAGNOSIS — G43.009 MIGRAINE WITHOUT AURA AND WITHOUT STATUS MIGRAINOSUS, NOT INTRACTABLE: Primary | ICD-10-CM

## 2023-08-15 DIAGNOSIS — E01.0 THYROMEGALY: ICD-10-CM

## 2023-08-15 DIAGNOSIS — Z00.00 WELLNESS EXAMINATION: ICD-10-CM

## 2023-08-15 PROCEDURE — 3074F SYST BP LT 130 MM HG: CPT | Mod: CPTII,S$GLB,, | Performed by: INTERNAL MEDICINE

## 2023-08-15 PROCEDURE — 3078F DIAST BP <80 MM HG: CPT | Mod: CPTII,S$GLB,, | Performed by: INTERNAL MEDICINE

## 2023-08-15 PROCEDURE — 73030 XR SHOULDER TRAUMA 3 VIEW RIGHT: ICD-10-PCS | Mod: 26,RT,, | Performed by: RADIOLOGY

## 2023-08-15 PROCEDURE — 73030 X-RAY EXAM OF SHOULDER: CPT | Mod: TC,FY,RT

## 2023-08-15 PROCEDURE — 99396 PREV VISIT EST AGE 40-64: CPT | Mod: S$GLB,,, | Performed by: INTERNAL MEDICINE

## 2023-08-15 PROCEDURE — 3008F PR BODY MASS INDEX (BMI) DOCUMENTED: ICD-10-PCS | Mod: CPTII,S$GLB,, | Performed by: INTERNAL MEDICINE

## 2023-08-15 PROCEDURE — 3008F BODY MASS INDEX DOCD: CPT | Mod: CPTII,S$GLB,, | Performed by: INTERNAL MEDICINE

## 2023-08-15 PROCEDURE — 99999 PR PBB SHADOW E&M-EST. PATIENT-LVL IV: ICD-10-PCS | Mod: PBBFAC,,, | Performed by: INTERNAL MEDICINE

## 2023-08-15 PROCEDURE — 1159F MED LIST DOCD IN RCRD: CPT | Mod: CPTII,S$GLB,, | Performed by: INTERNAL MEDICINE

## 2023-08-15 PROCEDURE — 3074F PR MOST RECENT SYSTOLIC BLOOD PRESSURE < 130 MM HG: ICD-10-PCS | Mod: CPTII,S$GLB,, | Performed by: INTERNAL MEDICINE

## 2023-08-15 PROCEDURE — 3078F PR MOST RECENT DIASTOLIC BLOOD PRESSURE < 80 MM HG: ICD-10-PCS | Mod: CPTII,S$GLB,, | Performed by: INTERNAL MEDICINE

## 2023-08-15 PROCEDURE — 73030 X-RAY EXAM OF SHOULDER: CPT | Mod: 26,RT,, | Performed by: RADIOLOGY

## 2023-08-15 PROCEDURE — 99396 PR PREVENTIVE VISIT,EST,40-64: ICD-10-PCS | Mod: S$GLB,,, | Performed by: INTERNAL MEDICINE

## 2023-08-15 PROCEDURE — 1159F PR MEDICATION LIST DOCUMENTED IN MEDICAL RECORD: ICD-10-PCS | Mod: CPTII,S$GLB,, | Performed by: INTERNAL MEDICINE

## 2023-08-15 PROCEDURE — 99999 PR PBB SHADOW E&M-EST. PATIENT-LVL IV: CPT | Mod: PBBFAC,,, | Performed by: INTERNAL MEDICINE

## 2023-08-15 RX ORDER — AMITRIPTYLINE HYDROCHLORIDE 10 MG/1
TABLET, FILM COATED ORAL
Qty: 180 TABLET | Refills: 1 | Status: SHIPPED | OUTPATIENT
Start: 2023-08-15 | End: 2024-01-04

## 2023-08-15 RX ORDER — IBUPROFEN 800 MG/1
800 TABLET ORAL 3 TIMES DAILY PRN
Qty: 90 TABLET | Refills: 1 | Status: SHIPPED | OUTPATIENT
Start: 2023-08-15

## 2023-08-15 RX ORDER — LUBIPROSTONE 24 UG/1
24 CAPSULE ORAL 2 TIMES DAILY
Qty: 180 CAPSULE | Refills: 3 | Status: SHIPPED | OUTPATIENT
Start: 2023-08-15

## 2023-08-15 RX ORDER — DEXTROAMPHETAMINE SACCHARATE, AMPHETAMINE ASPARTATE MONOHYDRATE, DEXTROAMPHETAMINE SULFATE AND AMPHETAMINE SULFATE 3.75; 3.75; 3.75; 3.75 MG/1; MG/1; MG/1; MG/1
15 CAPSULE, EXTENDED RELEASE ORAL EVERY MORNING
Qty: 30 CAPSULE | Refills: 0 | Status: SHIPPED | OUTPATIENT
Start: 2023-08-15 | End: 2023-11-20 | Stop reason: SDUPTHER

## 2023-08-15 NOTE — PROGRESS NOTES
Subjective:       Patient ID: Nora Conklin is a 48 y.o. female.    Chief Complaint: Annual Exam    ANNIESHkaris is feeling well.  Has a new significant other - planning marriage.  Has shoulder pain on the right for several months.  Denies injury.  Review of Systems   Respiratory:  Negative for shortness of breath (PND or orthopnea).    Cardiovascular:  Negative for chest pain (arm pain or jaw pain).   Gastrointestinal:  Negative for abdominal pain, diarrhea, nausea and vomiting.   Genitourinary:  Negative for dysuria.   Neurological:  Negative for seizures, syncope and headaches.       Objective:      Physical Exam  Constitutional:       General: She is not in acute distress.     Appearance: She is well-developed.   HENT:      Head: Normocephalic.   Eyes:      Pupils: Pupils are equal, round, and reactive to light.   Neck:      Thyroid: No thyromegaly.      Vascular: No JVD.      Comments: Mild thyromegaly no discrete nodule  Cardiovascular:      Rate and Rhythm: Normal rate and regular rhythm.      Heart sounds: Normal heart sounds. No murmur heard.     No friction rub. No gallop.   Pulmonary:      Effort: Pulmonary effort is normal.      Breath sounds: Normal breath sounds. No wheezing or rales.   Abdominal:      General: Bowel sounds are normal. There is no distension.      Palpations: Abdomen is soft. There is no mass.      Tenderness: There is no abdominal tenderness. There is no guarding or rebound.   Musculoskeletal:      Cervical back: Neck supple.      Comments: R shoulder FROM   Lymphadenopathy:      Cervical: No cervical adenopathy.   Skin:     General: Skin is warm and dry.   Neurological:      Mental Status: She is alert and oriented to person, place, and time.      Deep Tendon Reflexes: Reflexes are normal and symmetric.   Psychiatric:         Behavior: Behavior normal.         Thought Content: Thought content normal.         Judgment: Judgment normal.         Assessment:       1. Migraine without aura  and without status migrainosus, not intractable    2. Wellness examination    3. Thyromegaly        Plan:   Migraine without aura and without status migrainosus, not intractable  Controlled with elavil, nurtec, NSAIDS - reports one - two monthly  -     amitriptyline (ELAVIL) 10 MG tablet; Take 2 tablets by mouth every night around dinner time.  Dispense: 180 tablet; Refill: 1  Wellness examination  -     CBC Auto Differential; Future; Expected date: 08/15/2023  -     Comprehensive Metabolic Panel; Future; Expected date: 08/15/2023  -     Lipid Panel; Future; Expected date: 08/15/2023  -     TSH; Future; Expected date: 08/15/2023  -     Hemoglobin A1C; Future; Expected date: 08/15/2023  -     Vitamin D; Future; Expected date: 08/15/2023  -     Mammo Digital Screening Bilat w/ Christian; Future; Expected date: 02/15/2024  -     Fecal Immunochemical Test (iFOBT); Future; Expected date: 08/15/2023  -     Urinalysis; Future; Expected date: 08/15/2023  -     Urine culture; Future; Expected date: 08/15/2023  R shoulder bursitis  -     Ambulatory referral/consult to Sports Medicine; Future; Expected date: 08/22/2023  -     X-Ray Shoulder Trauma 3 view Right; Future; Expected date: 08/15/2023    Thyromegaly  -     US Soft Tissue Head Neck Thyroid; Future; Expected date: 08/15/2023    Other orders  -     ibuprofen (ADVIL,MOTRIN) 800 MG tablet; Take 1 tablet (800 mg total) by mouth 3 (three) times daily as needed for Pain.  Dispense: 90 tablet; Refill: 1  ADD  -     dextroamphetamine-amphetamine (ADDERALL XR) 15 MG 24 hr capsule; Take 1 capsule (15 mg total) by mouth every morning.  Dispense: 30 capsule; Refill: 0  IBS constipation predominant  -     lubiprostone (AMITIZA) 24 MCG Cap; Take 1 capsule (24 mcg total) by mouth 2 (two) times daily.  Dispense: 180 capsule; Refill: 3

## 2023-08-16 ENCOUNTER — HOSPITAL ENCOUNTER (OUTPATIENT)
Dept: RADIOLOGY | Facility: OTHER | Age: 48
Discharge: HOME | End: 2023-08-16
Attending: INTERNAL MEDICINE
Payer: COMMERCIAL

## 2023-08-16 DIAGNOSIS — E01.0 THYROMEGALY: ICD-10-CM

## 2023-08-16 PROCEDURE — 76536 US EXAM OF HEAD AND NECK: CPT | Mod: TC

## 2023-08-16 PROCEDURE — 76536 US EXAM OF HEAD AND NECK: CPT | Mod: 26,,, | Performed by: STUDENT IN AN ORGANIZED HEALTH CARE EDUCATION/TRAINING PROGRAM

## 2023-08-16 PROCEDURE — 76536 US SOFT TISSUE HEAD NECK THYROID: ICD-10-PCS | Mod: 26,,, | Performed by: STUDENT IN AN ORGANIZED HEALTH CARE EDUCATION/TRAINING PROGRAM

## 2023-08-16 NOTE — PROGRESS NOTES
Normal shoulder Xray so symptoms are most likely bursitis/tendonitis - please keep sports med appt.

## 2023-09-23 ENCOUNTER — IMMUNIZATION (OUTPATIENT)
Dept: INTERNAL MEDICINE | Facility: CLINIC | Age: 48
End: 2023-09-23
Payer: COMMERCIAL

## 2023-09-23 PROCEDURE — 90471 FLU VACCINE (QUAD) GREATER THAN OR EQUAL TO 3YO PRESERVATIVE FREE IM: ICD-10-PCS | Mod: S$GLB,,, | Performed by: INTERNAL MEDICINE

## 2023-09-23 PROCEDURE — 90686 IIV4 VACC NO PRSV 0.5 ML IM: CPT | Mod: S$GLB,,, | Performed by: INTERNAL MEDICINE

## 2023-09-23 PROCEDURE — 90471 IMMUNIZATION ADMIN: CPT | Mod: S$GLB,,, | Performed by: INTERNAL MEDICINE

## 2023-09-23 PROCEDURE — 90686 FLU VACCINE (QUAD) GREATER THAN OR EQUAL TO 3YO PRESERVATIVE FREE IM: ICD-10-PCS | Mod: S$GLB,,, | Performed by: INTERNAL MEDICINE

## 2023-09-27 ENCOUNTER — HOSPITAL ENCOUNTER (OUTPATIENT)
Dept: RADIOLOGY | Facility: OTHER | Age: 48
Discharge: HOME | End: 2023-09-27
Attending: INTERNAL MEDICINE
Payer: COMMERCIAL

## 2023-09-27 DIAGNOSIS — Z12.31 VISIT FOR SCREENING MAMMOGRAM: ICD-10-CM

## 2023-09-27 DIAGNOSIS — Z00.00 WELLNESS EXAMINATION: ICD-10-CM

## 2023-09-27 PROCEDURE — 77067 SCR MAMMO BI INCL CAD: CPT | Mod: TC

## 2023-09-27 PROCEDURE — 77063 MAMMO DIGITAL SCREENING BILAT WITH TOMO: ICD-10-PCS | Mod: 26,,, | Performed by: RADIOLOGY

## 2023-09-27 PROCEDURE — 77067 MAMMO DIGITAL SCREENING BILAT WITH TOMO: ICD-10-PCS | Mod: 26,,, | Performed by: RADIOLOGY

## 2023-09-27 PROCEDURE — 77067 SCR MAMMO BI INCL CAD: CPT | Mod: 26,,, | Performed by: RADIOLOGY

## 2023-09-27 PROCEDURE — 77063 BREAST TOMOSYNTHESIS BI: CPT | Mod: 26,,, | Performed by: RADIOLOGY

## 2023-09-29 ENCOUNTER — HOSPITAL ENCOUNTER (EMERGENCY)
Facility: HOSPITAL | Age: 48
Discharge: HOME | End: 2023-09-29
Attending: STUDENT IN AN ORGANIZED HEALTH CARE EDUCATION/TRAINING PROGRAM
Payer: COMMERCIAL

## 2023-09-29 VITALS
BODY MASS INDEX: 32.1 KG/M2 | HEIGHT: 61 IN | DIASTOLIC BLOOD PRESSURE: 66 MMHG | HEART RATE: 90 BPM | SYSTOLIC BLOOD PRESSURE: 120 MMHG | OXYGEN SATURATION: 100 % | TEMPERATURE: 99 F | RESPIRATION RATE: 18 BRPM | WEIGHT: 170 LBS

## 2023-09-29 DIAGNOSIS — R68.84 JAW PAIN: ICD-10-CM

## 2023-09-29 DIAGNOSIS — R51.9 NONINTRACTABLE EPISODIC HEADACHE, UNSPECIFIED HEADACHE TYPE: Primary | ICD-10-CM

## 2023-09-29 LAB
ALBUMIN SERPL BCP-MCNC: 4.1 G/DL (ref 3.5–5.2)
ALP SERPL-CCNC: 59 U/L (ref 38–126)
ALT SERPL W/O P-5'-P-CCNC: 20 U/L (ref 10–44)
ANION GAP SERPL CALC-SCNC: 9 MMOL/L (ref 8–16)
AST SERPL-CCNC: 29 U/L (ref 15–46)
BASOPHILS # BLD AUTO: 0.05 K/UL (ref 0–0.2)
BASOPHILS NFR BLD: 0.7 % (ref 0–1.9)
BILIRUB SERPL-MCNC: 0.6 MG/DL (ref 0.1–1)
CALCIUM SERPL-MCNC: 8.9 MG/DL (ref 8.7–10.5)
CHLORIDE SERPL-SCNC: 103 MMOL/L (ref 95–110)
CO2 SERPL-SCNC: 23 MMOL/L (ref 23–29)
CREAT SERPL-MCNC: 0.62 MG/DL (ref 0.5–1.4)
DIFFERENTIAL METHOD: ABNORMAL
EOSINOPHIL # BLD AUTO: 0.4 K/UL (ref 0–0.5)
EOSINOPHIL NFR BLD: 4.8 % (ref 0–8)
ERYTHROCYTE [DISTWIDTH] IN BLOOD BY AUTOMATED COUNT: 12.3 % (ref 11.5–14.5)
EST. GFR  (NO RACE VARIABLE): >60 ML/MIN/1.73 M^2
GLUCOSE SERPL-MCNC: 105 MG/DL (ref 70–110)
HCT VFR BLD AUTO: 36.9 % (ref 37–48.5)
HGB BLD-MCNC: 12 G/DL (ref 12–16)
IMM GRANULOCYTES # BLD AUTO: 0.01 K/UL (ref 0–0.04)
IMM GRANULOCYTES NFR BLD AUTO: 0.1 % (ref 0–0.5)
LYMPHOCYTES # BLD AUTO: 3 K/UL (ref 1–4.8)
LYMPHOCYTES NFR BLD: 40.5 % (ref 18–48)
MCH RBC QN AUTO: 29.5 PG (ref 27–31)
MCHC RBC AUTO-ENTMCNC: 32.5 G/DL (ref 32–36)
MCV RBC AUTO: 91 FL (ref 82–98)
MONOCYTES # BLD AUTO: 0.6 K/UL (ref 0.3–1)
MONOCYTES NFR BLD: 8.5 % (ref 4–15)
NEUTROPHILS # BLD AUTO: 3.3 K/UL (ref 1.8–7.7)
NEUTROPHILS NFR BLD: 45.4 % (ref 38–73)
NRBC BLD-RTO: 0 /100 WBC
PLATELET # BLD AUTO: 277 K/UL (ref 150–450)
PMV BLD AUTO: 9.6 FL (ref 9.2–12.9)
POTASSIUM SERPL-SCNC: 3.8 MMOL/L (ref 3.5–5.1)
PROT SERPL-MCNC: 8 G/DL (ref 6–8.4)
RBC # BLD AUTO: 4.07 M/UL (ref 4–5.4)
SODIUM SERPL-SCNC: 135 MMOL/L (ref 136–145)
TROPONIN I SERPL-MCNC: <0.012 NG/ML (ref 0.01–0.03)
UUN UR-MCNC: 16 MG/DL (ref 7–17)
WBC # BLD AUTO: 7.28 K/UL (ref 3.9–12.7)

## 2023-09-29 PROCEDURE — 80053 COMPREHEN METABOLIC PANEL: CPT | Mod: ER | Performed by: STUDENT IN AN ORGANIZED HEALTH CARE EDUCATION/TRAINING PROGRAM

## 2023-09-29 PROCEDURE — 25000003 PHARM REV CODE 250: Mod: ER | Performed by: STUDENT IN AN ORGANIZED HEALTH CARE EDUCATION/TRAINING PROGRAM

## 2023-09-29 PROCEDURE — 93005 ELECTROCARDIOGRAM TRACING: CPT | Mod: ER

## 2023-09-29 PROCEDURE — 85025 COMPLETE CBC W/AUTO DIFF WBC: CPT | Mod: ER | Performed by: STUDENT IN AN ORGANIZED HEALTH CARE EDUCATION/TRAINING PROGRAM

## 2023-09-29 PROCEDURE — 96374 THER/PROPH/DIAG INJ IV PUSH: CPT | Mod: ER

## 2023-09-29 PROCEDURE — 63600175 PHARM REV CODE 636 W HCPCS: Mod: ER | Performed by: STUDENT IN AN ORGANIZED HEALTH CARE EDUCATION/TRAINING PROGRAM

## 2023-09-29 PROCEDURE — 84484 ASSAY OF TROPONIN QUANT: CPT | Mod: ER | Performed by: STUDENT IN AN ORGANIZED HEALTH CARE EDUCATION/TRAINING PROGRAM

## 2023-09-29 PROCEDURE — 93010 EKG 12-LEAD: ICD-10-PCS | Mod: ,,, | Performed by: INTERNAL MEDICINE

## 2023-09-29 PROCEDURE — 93010 ELECTROCARDIOGRAM REPORT: CPT | Mod: ,,, | Performed by: INTERNAL MEDICINE

## 2023-09-29 PROCEDURE — 96361 HYDRATE IV INFUSION ADD-ON: CPT | Mod: ER

## 2023-09-29 PROCEDURE — 99284 EMERGENCY DEPT VISIT MOD MDM: CPT | Mod: ER,25

## 2023-09-29 RX ORDER — PROCHLORPERAZINE EDISYLATE 5 MG/ML
10 INJECTION INTRAMUSCULAR; INTRAVENOUS ONCE
Status: COMPLETED | OUTPATIENT
Start: 2023-09-29 | End: 2023-09-29

## 2023-09-29 RX ADMIN — SODIUM CHLORIDE 500 ML: 9 INJECTION, SOLUTION INTRAVENOUS at 07:09

## 2023-09-29 RX ADMIN — PROCHLORPERAZINE EDISYLATE 10 MG: 5 INJECTION INTRAMUSCULAR; INTRAVENOUS at 07:09

## 2023-09-29 NOTE — Clinical Note
"Nora De Leon" Rubin was seen and treated in our emergency department on 9/29/2023.  She may return to work on 10/02/2023.       If you have any questions or concerns, please don't hesitate to call.      YOLETTE Washington RN    "

## 2023-09-30 NOTE — ED PROVIDER NOTES
NAME:  Nora Conklin  CSN:     [id number]  MRN:    [id number]  ADMIT DATE: 2023        eMERGENCY dEPARTMENT eNCOUnter    CHIEF COMPLAINT    Chief Complaint   Patient presents with    migraines     Reports to ED c c/o migraines x 1 week. States L side, aching. Radiates into L side of jaw and neck. Denies any visual change, vomiting, or photophobia.        HPI    Nora Conklin is a 48 y.o. female with a past medical history of  has a past medical history of Medical history non-contributory and Migraine headache.     she presents to the ED due to pain involving left jaw and radiating into her left neck and shoulder.  States it started around 2:00 p.m. at work.  Notes a long history of migraines and she has been having worsening migraines over the last week.   states she is described burning headache at 1 point and has had photophobia intermittently.  Patient states she did take Zyrtec about 30 minutes prior to arrival without much improvement in symptoms.  She states she is less concerned about her headaches at this time as these are chronic and ongoing for her, usually improve was Nurtec NSAIDs and amitriptyline.  She is more concerned as she is never had this discomfort in her jaw and left neck before and is concerned she could be having underlying cardiac symptoms.  Denies any isabel chest pain, shortness of breath.  Denies any history of heart disease in first-degree relative before the age of 50.  Does not have any other medical problems.      HPI       PAST MEDICAL HISTORY  Past Medical History:   Diagnosis Date    Medical history non-contributory     Migraine headache        SURGICAL HISTORY    Past Surgical History:   Procedure Laterality Date     SECTION         FAMILY HISTORY    Family History   Problem Relation Age of Onset    Cancer Maternal Grandmother         lung     Cervical cancer Mother     Emphysema Mother     Hypertension Sister     Valvular heart disease Maternal Aunt      "Breast cancer Neg Hx     Colon cancer Neg Hx     Ovarian cancer Neg Hx        SOCIAL HISTORY    Social History     Socioeconomic History    Marital status: Single   Occupational History    Occupation: lpn    Tobacco Use    Smoking status: Never     Passive exposure: Never    Smokeless tobacco: Never   Substance and Sexual Activity    Alcohol use: Yes     Alcohol/week: 0.0 standard drinks of alcohol     Comment: rare    Drug use: No    Sexual activity: Not Currently     Partners: Male     Birth control/protection: None       MEDICATIONS  Current Outpatient Medications   Medication Instructions    amitriptyline (ELAVIL) 10 MG tablet Take 2 tablets by mouth every night around dinner time.    ASPIRIN/ACETAMINOPHEN/CAFFEINE (GOODY'S MIGRAINE RELIEF ORAL) Oral    dextroamphetamine-amphetamine (ADDERALL XR) 15 MG 24 hr capsule 15 mg, Oral, Every morning    ibuprofen (ADVIL,MOTRIN) 800 mg, Oral, 3 times daily PRN    lubiprostone (AMITIZA) 24 mcg, Oral, 2 times daily    polyethylene glycol (GLYCOLAX) 17 gram/dose powder Mix 1 capful (17 g) in liquid and take by mouth daily as needed.    rimegepant (NURTEC) 75 mg odt One tablet by mouth daily as needed for migraine max one tablet in 24 hours Place ODT tablet on the tongue; alternatively the ODT tablet may be placed under the tongue       ALLERGIES    Review of patient's allergies indicates:  No Known Allergies      REVIEW OF SYSTEMS   Review of Systems       PHYSICAL EXAM    Reviewed Triage Note    VITAL SIGNS:   ED Triage Vitals [09/29/23 1859]   Enc Vitals Group      /78      Pulse 91      Resp 19      Temp 97.5 °F (36.4 °C)      Temp Source Oral      SpO2 100 %      Weight 170 lb      Height 5' 1"      Head Circumference       Peak Flow       Pain Score       Pain Loc       Pain Edu?       Excl. in GC?        Patient Vitals for the past 24 hrs:   BP Temp Temp src Pulse Resp SpO2 Height Weight   09/29/23 1859 132/78 97.5 °F (36.4 °C) Oral 91 19 100 % 5' 1" (1.549 m) " 77.1 kg (170 lb)       Physical Exam    Nursing note and vitals reviewed.  Constitutional: She appears well-developed and well-nourished.   HENT:   Head: Normocephalic and atraumatic.   Eyes: EOM are normal. Pupils are equal, round, and reactive to light.   Neck: Neck supple.   Normal range of motion.  Cardiovascular:  Normal rate, regular rhythm and normal heart sounds.           Pulmonary/Chest: Breath sounds normal. No respiratory distress.   Abdominal: Abdomen is soft. There is no abdominal tenderness.   Musculoskeletal:         General: Normal range of motion.      Cervical back: Normal range of motion and neck supple.     Neurological: She is alert and oriented to person, place, and time. She has normal strength. No cranial nerve deficit or sensory deficit.   Skin: Skin is warm and dry.   Psychiatric: She has a normal mood and affect.                EKG     Interpreted by EM physician if performed:   Normal sinus rhythm. No ST elevation or depression.  No T-wave inversions.  No evidence of ischemia. Rate 80            LABS  Pertinent labs reviewed. (See chart for details)   Labs Reviewed   CBC W/ AUTO DIFFERENTIAL - Abnormal; Notable for the following components:       Result Value    Hematocrit 36.9 (*)     All other components within normal limits   COMPREHENSIVE METABOLIC PANEL - Abnormal; Notable for the following components:    Sodium 135 (*)     All other components within normal limits   TROPONIN I         RADIOLOGY          Imaging Results    None           PROCEDURES    Procedures      ED COURSE & MEDICAL DECISION MAKING    Pertinent Labs & Imaging studies reviewed. (See chart for details and specific orders.)          Summary of review of records:     Last office visit with primary care in August of this year.  Known migraines controlled with amitriptyline, Nurtec in NSAIDs typically.    MDM:  Nora Conklin is a 48 y.o. female who presents with concern for pain to the left jaw and left neck  occurring at work around 2:00 p.m. today.  She does note mild headache consistent with previous migraines is concerned as she is not had these symptoms previously.    Differential includes but is not limited to progression of previous migrainous picture, ACS, doubt underlying infectious etiology at this time    CBC, CMP ordered as well as EKG, troponin          Medications   sodium chloride 0.9% bolus 500 mL 500 mL (500 mLs Intravenous New Bag 9/29/23 1937)   prochlorperazine injection Soln 10 mg (10 mg Intravenous Given 9/29/23 1937)       ED Course as of 09/29/23 2103   Fri Sep 29, 2023   1948 CBC auto differential(!)  No acute abnormalities  [HL]   2023 Comprehensive metabolic panel(!)  No acute abnormalities  [HL]   2041 Troponin I: <0.012 [HL]   2101 All findings discussed with patient and spouse at bedside.  Notes headache and symptoms have improved.  Did discuss referral to Neurology for ongoing management of her persistent migraines.  Comfortable with this plan.  Encouraged close follow-up with primary care as well.  Reasons to return discussed and all questions addressed.  Feel that this is likely secondary to underlying cardiac etiology at this time in the setting of reassuring EKG and negative troponin and this was discussed at bedside.  However, did recommend ongoing outpatient evaluation.  All questions were addressed and patient stable at discharge [HL]      ED Course User Index  [HL] Rayna Ford,        Medical Decision Making  Problems Addressed:  Jaw pain: acute illness or injury  Nonintractable episodic headache, unspecified headache type: chronic illness or injury with exacerbation, progression, or side effects of treatment     Details: Given IV fluids and Compazine    Amount and/or Complexity of Data Reviewed  Labs: ordered. Decision-making details documented in ED Course.  ECG/medicine tests: ordered and independent interpretation performed. Decision-making details documented in ED  Course.    Risk  Prescription drug management.        FINAL IMPRESSION    Final diagnoses:  [R68.84] Jaw pain  [R51.9] Nonintractable episodic headache, unspecified headache type (Primary)       DISPOSITION  Patient discharged in stable condition        ED Prescriptions    None       Follow-up Information       Follow up With Specialties Details Why Contact Info    Tyesha Bauer MD Internal Medicine Schedule an appointment as soon as possible for a visit   [address]  [facility] [address], [address]  [place] [address]  [phone]      [facility] - Emergency Dept Emergency Medicine  As needed, If symptoms worsen [address] [address] [phone]              DISCLAIMER: This note was prepared with [employer] voice recognition transcription software. Garbled syntax, mangled pronouns, and other bizarre constructions may be attributed to that software system.      DO Liliana Haywood Hanna K., DO  09/30/23 0443

## 2023-11-01 ENCOUNTER — OFFICE VISIT (OUTPATIENT)
Dept: OBSTETRICS AND GYNECOLOGY | Facility: CLINIC | Age: 48
End: 2023-11-01
Payer: COMMERCIAL

## 2023-11-01 VITALS
BODY MASS INDEX: 33 KG/M2 | HEIGHT: 61 IN | SYSTOLIC BLOOD PRESSURE: 128 MMHG | DIASTOLIC BLOOD PRESSURE: 80 MMHG | WEIGHT: 174.81 LBS

## 2023-11-01 DIAGNOSIS — N89.8 VAGINAL IRRITATION: ICD-10-CM

## 2023-11-01 DIAGNOSIS — N89.8 VAGINAL DISCHARGE: ICD-10-CM

## 2023-11-01 DIAGNOSIS — Z01.419 WELL WOMAN EXAM WITH ROUTINE GYNECOLOGICAL EXAM: Primary | ICD-10-CM

## 2023-11-01 PROCEDURE — 3074F SYST BP LT 130 MM HG: CPT | Mod: CPTII,S$GLB,, | Performed by: OBSTETRICS & GYNECOLOGY

## 2023-11-01 PROCEDURE — 3008F PR BODY MASS INDEX (BMI) DOCUMENTED: ICD-10-PCS | Mod: CPTII,S$GLB,, | Performed by: OBSTETRICS & GYNECOLOGY

## 2023-11-01 PROCEDURE — 99396 PREV VISIT EST AGE 40-64: CPT | Mod: S$GLB,,, | Performed by: OBSTETRICS & GYNECOLOGY

## 2023-11-01 PROCEDURE — 3008F BODY MASS INDEX DOCD: CPT | Mod: CPTII,S$GLB,, | Performed by: OBSTETRICS & GYNECOLOGY

## 2023-11-01 PROCEDURE — 3074F PR MOST RECENT SYSTOLIC BLOOD PRESSURE < 130 MM HG: ICD-10-PCS | Mod: CPTII,S$GLB,, | Performed by: OBSTETRICS & GYNECOLOGY

## 2023-11-01 PROCEDURE — 3079F DIAST BP 80-89 MM HG: CPT | Mod: CPTII,S$GLB,, | Performed by: OBSTETRICS & GYNECOLOGY

## 2023-11-01 PROCEDURE — 3044F HG A1C LEVEL LT 7.0%: CPT | Mod: CPTII,S$GLB,, | Performed by: OBSTETRICS & GYNECOLOGY

## 2023-11-01 PROCEDURE — 1159F MED LIST DOCD IN RCRD: CPT | Mod: CPTII,S$GLB,, | Performed by: OBSTETRICS & GYNECOLOGY

## 2023-11-01 PROCEDURE — 1160F RVW MEDS BY RX/DR IN RCRD: CPT | Mod: CPTII,S$GLB,, | Performed by: OBSTETRICS & GYNECOLOGY

## 2023-11-01 PROCEDURE — 88141 PR  CYTOPATH CERV/VAG INTERPRET: ICD-10-PCS | Mod: ,,, | Performed by: PATHOLOGY

## 2023-11-01 PROCEDURE — 99999 PR PBB SHADOW E&M-EST. PATIENT-LVL III: ICD-10-PCS | Mod: PBBFAC,,, | Performed by: OBSTETRICS & GYNECOLOGY

## 2023-11-01 PROCEDURE — 88141 CYTOPATH C/V INTERPRET: CPT | Mod: ,,, | Performed by: PATHOLOGY

## 2023-11-01 PROCEDURE — 87624 HPV HI-RISK TYP POOLED RSLT: CPT | Performed by: OBSTETRICS & GYNECOLOGY

## 2023-11-01 PROCEDURE — 3044F PR MOST RECENT HEMOGLOBIN A1C LEVEL <7.0%: ICD-10-PCS | Mod: CPTII,S$GLB,, | Performed by: OBSTETRICS & GYNECOLOGY

## 2023-11-01 PROCEDURE — 3079F PR MOST RECENT DIASTOLIC BLOOD PRESSURE 80-89 MM HG: ICD-10-PCS | Mod: CPTII,S$GLB,, | Performed by: OBSTETRICS & GYNECOLOGY

## 2023-11-01 PROCEDURE — 99999 PR PBB SHADOW E&M-EST. PATIENT-LVL III: CPT | Mod: PBBFAC,,, | Performed by: OBSTETRICS & GYNECOLOGY

## 2023-11-01 PROCEDURE — 1159F PR MEDICATION LIST DOCUMENTED IN MEDICAL RECORD: ICD-10-PCS | Mod: CPTII,S$GLB,, | Performed by: OBSTETRICS & GYNECOLOGY

## 2023-11-01 PROCEDURE — 87591 N.GONORRHOEAE DNA AMP PROB: CPT | Performed by: OBSTETRICS & GYNECOLOGY

## 2023-11-01 PROCEDURE — 99396 PR PREVENTIVE VISIT,EST,40-64: ICD-10-PCS | Mod: S$GLB,,, | Performed by: OBSTETRICS & GYNECOLOGY

## 2023-11-01 PROCEDURE — 81514 NFCT DS BV&VAGINITIS DNA ALG: CPT | Performed by: OBSTETRICS & GYNECOLOGY

## 2023-11-01 PROCEDURE — 1160F PR REVIEW ALL MEDS BY PRESCRIBER/CLIN PHARMACIST DOCUMENTED: ICD-10-PCS | Mod: CPTII,S$GLB,, | Performed by: OBSTETRICS & GYNECOLOGY

## 2023-11-01 PROCEDURE — 88175 CYTOPATH C/V AUTO FLUID REDO: CPT | Performed by: PATHOLOGY

## 2023-11-01 RX ORDER — FLUCONAZOLE 150 MG/1
150 TABLET ORAL ONCE
COMMUNITY
Start: 2023-07-20

## 2023-11-02 LAB
C TRACH DNA SPEC QL NAA+PROBE: DETECTED
N GONORRHOEA DNA SPEC QL NAA+PROBE: NOT DETECTED

## 2023-11-03 ENCOUNTER — TELEPHONE (OUTPATIENT)
Dept: OBSTETRICS AND GYNECOLOGY | Facility: CLINIC | Age: 48
End: 2023-11-03
Payer: COMMERCIAL

## 2023-11-03 LAB
BACTERIAL VAGINOSIS DNA: POSITIVE
CANDIDA GLABRATA DNA: NEGATIVE
CANDIDA KRUSEI DNA: NEGATIVE
CANDIDA RRNA VAG QL PROBE: POSITIVE
T VAGINALIS RRNA GENITAL QL PROBE: NEGATIVE

## 2023-11-03 NOTE — TELEPHONE ENCOUNTER
----- Message from Francheska Roca sent at 11/3/2023  9:32 AM CDT -----  Type:  Patient Returning Call    Who Called:pt  Who Left Message for Patient: James   Does the patient know what this is regarding?:  Would the patient rather a call back or a response via [facility]?   Best Call Back Number:[phone] (M)  Additional Information:  she saw her results and wants to know if prescription can be sent to the [facility]. Any other instructions please leave detail msg or portal. She is in class right now.

## 2023-11-03 NOTE — TELEPHONE ENCOUNTER
Pt was calling about the pharmacy she had on file.The pharmacy is correct. All questions answered.

## 2023-11-09 LAB
FINAL PATHOLOGIC DIAGNOSIS: ABNORMAL
Lab: ABNORMAL

## 2023-11-20 NOTE — TELEPHONE ENCOUNTER
No care due was identified.  Health [facility] Embedded Care Due Messages. Reference number: [id number].   11/20/2023 6:16:48 AM CST

## 2023-11-22 ENCOUNTER — TELEPHONE (OUTPATIENT)
Dept: INTERNAL MEDICINE | Facility: CLINIC | Age: 48
End: 2023-11-22
Payer: COMMERCIAL

## 2023-11-22 RX ORDER — ALPRAZOLAM 0.25 MG/1
0.25 TABLET ORAL 2 TIMES DAILY PRN
Qty: 10 TABLET | Refills: 0 | Status: SHIPPED | OUTPATIENT
Start: 2023-11-22 | End: 2023-12-22

## 2023-11-26 RX ORDER — DEXTROAMPHETAMINE SACCHARATE, AMPHETAMINE ASPARTATE MONOHYDRATE, DEXTROAMPHETAMINE SULFATE AND AMPHETAMINE SULFATE 3.75; 3.75; 3.75; 3.75 MG/1; MG/1; MG/1; MG/1
15 CAPSULE, EXTENDED RELEASE ORAL EVERY MORNING
Qty: 30 CAPSULE | Refills: 0 | Status: SHIPPED | OUTPATIENT
Start: 2023-11-26 | End: 2024-02-19 | Stop reason: SDUPTHER

## 2023-12-15 ENCOUNTER — PATIENT MESSAGE (OUTPATIENT)
Dept: INTERNAL MEDICINE | Facility: CLINIC | Age: 48
End: 2023-12-15
Payer: COMMERCIAL

## 2023-12-15 DIAGNOSIS — Z00.00 WELLNESS EXAMINATION: Primary | ICD-10-CM

## 2023-12-15 DIAGNOSIS — Z11.1 SCREENING-PULMONARY TB: ICD-10-CM

## 2023-12-18 ENCOUNTER — LAB VISIT (OUTPATIENT)
Dept: LAB | Facility: OTHER | Age: 48
End: 2023-12-18
Attending: INTERNAL MEDICINE
Payer: COMMERCIAL

## 2023-12-18 DIAGNOSIS — Z00.00 WELLNESS EXAMINATION: ICD-10-CM

## 2023-12-18 DIAGNOSIS — Z11.1 SCREENING-PULMONARY TB: ICD-10-CM

## 2023-12-18 PROCEDURE — 86480 TB TEST CELL IMMUN MEASURE: CPT | Performed by: INTERNAL MEDICINE

## 2023-12-19 LAB
GAMMA INTERFERON BACKGROUND BLD IA-ACNC: 0.05 IU/ML
M TB IFN-G CD4+ BCKGRND COR BLD-ACNC: 0.01 IU/ML
M TB IFN-G CD4+ BCKGRND COR BLD-ACNC: 0.02 IU/ML
MITOGEN IGNF BCKGRD COR BLD-ACNC: 9.95 IU/ML
TB GOLD PLUS: NEGATIVE

## 2024-01-04 ENCOUNTER — OFFICE VISIT (OUTPATIENT)
Dept: NEUROLOGY | Facility: CLINIC | Age: 49
End: 2024-01-04
Payer: COMMERCIAL

## 2024-01-04 VITALS
HEART RATE: 92 BPM | DIASTOLIC BLOOD PRESSURE: 86 MMHG | SYSTOLIC BLOOD PRESSURE: 130 MMHG | BODY MASS INDEX: 31.84 KG/M2 | HEIGHT: 61 IN | WEIGHT: 168.63 LBS

## 2024-01-04 DIAGNOSIS — G43.009 MIGRAINE WITHOUT AURA AND WITHOUT STATUS MIGRAINOSUS, NOT INTRACTABLE: Primary | ICD-10-CM

## 2024-01-04 DIAGNOSIS — R51.9 NONINTRACTABLE EPISODIC HEADACHE, UNSPECIFIED HEADACHE TYPE: ICD-10-CM

## 2024-01-04 PROCEDURE — 1160F RVW MEDS BY RX/DR IN RCRD: CPT | Mod: CPTII,S$GLB,, | Performed by: STUDENT IN AN ORGANIZED HEALTH CARE EDUCATION/TRAINING PROGRAM

## 2024-01-04 PROCEDURE — 3075F SYST BP GE 130 - 139MM HG: CPT | Mod: CPTII,S$GLB,, | Performed by: STUDENT IN AN ORGANIZED HEALTH CARE EDUCATION/TRAINING PROGRAM

## 2024-01-04 PROCEDURE — 3008F BODY MASS INDEX DOCD: CPT | Mod: CPTII,S$GLB,, | Performed by: STUDENT IN AN ORGANIZED HEALTH CARE EDUCATION/TRAINING PROGRAM

## 2024-01-04 PROCEDURE — 99999 PR PBB SHADOW E&M-EST. PATIENT-LVL IV: CPT | Mod: PBBFAC,,, | Performed by: STUDENT IN AN ORGANIZED HEALTH CARE EDUCATION/TRAINING PROGRAM

## 2024-01-04 PROCEDURE — 3079F DIAST BP 80-89 MM HG: CPT | Mod: CPTII,S$GLB,, | Performed by: STUDENT IN AN ORGANIZED HEALTH CARE EDUCATION/TRAINING PROGRAM

## 2024-01-04 PROCEDURE — 99213 OFFICE O/P EST LOW 20 MIN: CPT | Mod: S$GLB,,, | Performed by: STUDENT IN AN ORGANIZED HEALTH CARE EDUCATION/TRAINING PROGRAM

## 2024-01-04 PROCEDURE — 1159F MED LIST DOCD IN RCRD: CPT | Mod: CPTII,S$GLB,, | Performed by: STUDENT IN AN ORGANIZED HEALTH CARE EDUCATION/TRAINING PROGRAM

## 2024-01-04 RX ORDER — VENLAFAXINE HYDROCHLORIDE 37.5 MG/1
37.5 CAPSULE, EXTENDED RELEASE ORAL DAILY
Qty: 30 CAPSULE | Refills: 11 | Status: SHIPPED | OUTPATIENT
Start: 2024-01-04 | End: 2025-01-03

## 2024-01-04 NOTE — PROGRESS NOTES
[facility] - NEUROLOGY [address] [facility], [place]    Date: 1/4/24  Patient Name: Nora Conklin   MRN: [id number]   PCP: Tyesha Bauer  Referring Provider: Rayna Ford,     Assessment:   Nora Conklin is a 48 y.o. female presenting evaluation of headaches.  Description of headaches is consistent with migraine with aura.  We will start Effexor for migraine prophylaxis given that patient did have a mild improvement with amitriptyline.  Continue Nurtec as needed for rescue.  If this does not work, we can consider a CGRP injectable.    Plan:     Problem List Items Addressed This Visit          Neuro    Migraine without status migrainosus, not intractable - Primary    Current Assessment & Plan     Start effexor 37.5 mg daily  Nurtec PRN for rescue  RTC 2 months          Other Visit Diagnoses       Nonintractable episodic headache, unspecified headache type                Camille Moreno MD  [facility]   Department of Neurology/Epilepsy      Patient note was created using MModal Dictation.  Any errors in syntax or even information may not have been identified and edited on initial review prior to signing this note.  Subjective:       Headache history:   Age of onset -  childhood  Location - varies, can be right or left temporal, can also be bifrontal  Nature of pain -  throbbing  Aura -  shimmering lights  Duration of headache - can be days  Time to peak intensity -  gradually builds up  Pain scale - range of intensity -    Frequency -  1-2 times per week   Status Migrainosus history - yes  Time of day of most headaches- variable     Associated symptoms with the headache, bolded items are positive:   Meningeal symptoms - photophobia, phonophobia, exercise intolerance   Nausea/vomitting  Nasal drainage   Visual blurriness   Pallor/flushing  Dizziness   Vertigo  Confusion  Impaired concentration   Pain worsened with physical activity   Neck pain       Cluster headache symptoms: none     Symptoms of increased intracranial pressure: none  Whooshing sounds   Visual spots/blotches      Basilar migraine symptoms - none     Headache Triggers, bolded items are positive:  Bright or flickering light  Strong smell  Vigorous exercise  Dietary factors   Visual strain   Weather changes  Exertion  Heat (hot weather, hot baths or showers)  Stress        Treatment history:  Resolution of headache with sleep - yes  Meds tried:elavil 20 mg daily - stopped only mildly effective, topamax did not work and she had vision changes, fioricet, sumatriptan did not work,   Takes goody's and recently started nurtec which does sometimes work       Headache risk factors: none  H/o TBI  -   H/o Meningitis  -   F/h Aneurysms  -         PAST MEDICAL HISTORY:  Past Medical History:   Diagnosis Date    Medical history non-contributory     Migraine headache        PAST SURGICAL HISTORY:  Past Surgical History:   Procedure Laterality Date     SECTION         CURRENT MEDS:  Current Outpatient Medications   Medication Sig Dispense Refill    ALPRAZolam (XANAX) 0.25 MG tablet Take 1 tablet (0.25 mg total) by mouth 2 (two) times daily as needed for Anxiety. 10 tablet 0    amitriptyline (ELAVIL) 10 MG tablet Take 2 tablets by mouth every night around dinner time. 180 tablet 1    ASPIRIN/ACETAMINOPHEN/CAFFEINE (GOODY'S MIGRAINE RELIEF ORAL) Take by mouth.      dextroamphetamine-amphetamine (ADDERALL XR) 15 MG 24 hr capsule Take 1 capsule (15 mg total) by mouth every morning. 30 capsule 0    fluconazole (DIFLUCAN) 150 MG Tab Take 150 mg by mouth once.      ibuprofen (ADVIL,MOTRIN) 800 MG tablet Take 1 tablet (800 mg total) by mouth 3 (three) times daily as needed for Pain. 90 tablet 1    lubiprostone (AMITIZA) 24 MCG Cap Take 1 capsule (24 mcg total) by mouth 2 (two) times daily. 180 capsule 3    metroNIDAZOLE (FLAGYL) 500 MG tablet Take 1 tablet (500 mg total) by mouth every 12 (twelve) hours. 14  "tablet 0    polyethylene glycol (GLYCOLAX) 17 gram/dose powder Mix 1 capful (17 g) in liquid and take by mouth daily as needed. 510 g 2    rimegepant (NURTEC) 75 mg odt One tablet by mouth daily as needed for migraine max one tablet in 24 hours Place ODT tablet on the tongue; alternatively the ODT tablet may be placed under the tongue 30 tablet 1    terconazole (TERAZOL 3) 0.8 % vaginal cream Place 1 applicator vaginally every evening. 20 g 1     Current Facility-Administered Medications   Medication Dose Route Frequency Provider Last Rate Last Admin    gentamicin injection 120 mg  120 mg Intramuscular 1 time in Clinic/HOD Tyesha Bauer MD           ALLERGIES:  Review of patient's allergies indicates:  No Known Allergies    FAMILY HISTORY:  Family History   Problem Relation Age of Onset    Cancer Maternal Grandmother         lung     Cervical cancer Mother     Emphysema Mother     Hypertension Sister     Valvular heart disease Maternal Aunt     Breast cancer Neg Hx     Colon cancer Neg Hx     Ovarian cancer Neg Hx        SOCIAL HISTORY:  Social History     Tobacco Use    Smoking status: Never     Passive exposure: Never    Smokeless tobacco: Never   Substance Use Topics    Alcohol use: Yes     Alcohol/week: 0.0 standard drinks of alcohol     Comment: rare    Drug use: No       Review of Systems:  12 system review of systems is negative except for the symptoms mentioned in HPI.      Objective:     Vitals:    01/04/24 1203   BP: 130/86   Pulse: 92   Weight: 76.5 kg (168 lb 10.4 oz)   Height: 5' 1" (1.549 m)     General: NAD, well nourished   Eyes: no tearing, discharge, no erythema   ENT: moist mucous membranes of the oral cavity, nares patent    Neck: Supple, full range of motion  Cardiovascular: Warm and well perfused, pulses equal and symmetrical  Lungs: Normal work of breathing, normal chest wall excursions  Skin: No rash, lesions, or breakdown on exposed skin  Psychiatry: Mood and affect are appropriate "   Abdomen: soft, non tender, non distended  Extremeties: No cyanosis, clubbing or edema.    Neurological   MENTAL STATUS: Alert and oriented to person, place, and time. Attention and concentration within normal limits. Speech without dysarthria, able to name and repeat without difficulty. Recent and remote memory within normal limits   CRANIAL NERVES: Visual fields intact. PERRL. EOMI. Facial sensation intact. Face symmetrical. Hearing grossly intact. Full shoulder shrug bilaterally. Tongue protrudes midline   SENSORY: Sensation is intact to light touch throughout.  Joint position perception intact. Negative Romberg.   MOTOR: Normal bulk and tone. No pronator drift.  5/5 deltoid, biceps, triceps, interosseous, hand  bilaterally. 5/5 iliopsoas, knee extension/flexion, foot dorsi/plantarflexion bilaterally.    REFLEXES: Symmetric and 2+ throughout. Toes down going bilaterally.   CEREBELLAR/COORDINATION/GAIT: Gait steady with normal arm swing and stride length.  Heel to shin intact. Finger to nose intact. Normal rapid alternating movements.

## 2024-02-19 RX ORDER — RIMEGEPANT SULFATE 75 MG/75MG
TABLET, ORALLY DISINTEGRATING ORAL
Qty: 30 TABLET | Refills: 1 | Status: SHIPPED | OUTPATIENT
Start: 2024-02-19

## 2024-02-19 RX ORDER — DEXTROAMPHETAMINE SACCHARATE, AMPHETAMINE ASPARTATE MONOHYDRATE, DEXTROAMPHETAMINE SULFATE AND AMPHETAMINE SULFATE 3.75; 3.75; 3.75; 3.75 MG/1; MG/1; MG/1; MG/1
15 CAPSULE, EXTENDED RELEASE ORAL EVERY MORNING
Qty: 30 CAPSULE | Refills: 0 | Status: SHIPPED | OUTPATIENT
Start: 2024-02-19 | End: 2024-04-30 | Stop reason: SDUPTHER

## 2024-02-19 NOTE — TELEPHONE ENCOUNTER
No care due was identified.  Health [facility] Embedded Care Due Messages. Reference number: [id number].   2/19/2024 4:22:04 AM CST

## 2024-05-01 RX ORDER — DEXTROAMPHETAMINE SACCHARATE, AMPHETAMINE ASPARTATE MONOHYDRATE, DEXTROAMPHETAMINE SULFATE AND AMPHETAMINE SULFATE 3.75; 3.75; 3.75; 3.75 MG/1; MG/1; MG/1; MG/1
15 CAPSULE, EXTENDED RELEASE ORAL EVERY MORNING
Qty: 30 CAPSULE | Refills: 0 | Status: SHIPPED | OUTPATIENT
Start: 2024-05-01 | End: 2024-06-11 | Stop reason: SDUPTHER

## 2024-05-01 NOTE — TELEPHONE ENCOUNTER
No care due was identified.  Health [facility] Embedded Care Due Messages. Reference number: [id number].   4/30/2024 10:10:02 PM CDT

## 2024-05-16 ENCOUNTER — OFFICE VISIT (OUTPATIENT)
Dept: OPTOMETRY | Facility: CLINIC | Age: 49
End: 2024-05-16
Payer: COMMERCIAL

## 2024-05-16 DIAGNOSIS — H52.4 MYOPIA OF BOTH EYES WITH ASTIGMATISM AND PRESBYOPIA: Primary | ICD-10-CM

## 2024-05-16 DIAGNOSIS — H52.13 MYOPIA OF BOTH EYES WITH ASTIGMATISM AND PRESBYOPIA: Primary | ICD-10-CM

## 2024-05-16 DIAGNOSIS — H52.203 MYOPIA OF BOTH EYES WITH ASTIGMATISM AND PRESBYOPIA: Primary | ICD-10-CM

## 2024-05-16 PROCEDURE — 1159F MED LIST DOCD IN RCRD: CPT | Mod: CPTII,S$GLB,, | Performed by: OPTOMETRIST

## 2024-05-16 PROCEDURE — 92015 DETERMINE REFRACTIVE STATE: CPT | Mod: S$GLB,,, | Performed by: OPTOMETRIST

## 2024-05-16 PROCEDURE — 99999 PR PBB SHADOW E&M-EST. PATIENT-LVL III: CPT | Mod: PBBFAC,,, | Performed by: OPTOMETRIST

## 2024-05-16 PROCEDURE — 92004 COMPRE OPH EXAM NEW PT 1/>: CPT | Mod: S$GLB,,, | Performed by: OPTOMETRIST

## 2024-05-16 NOTE — PROGRESS NOTES
HPI    Pt is here today for a Routine exam.  She states she has noticed a gradual decrease in her vision at both   ranges. She does have prescription glasses for distance but they are not   helping very much.    No Current Eye Meds.      Last edited by Marcus Dewitt on 5/16/2024  1:51 PM.            Assessment /Plan     For exam results, see Encounter Report.    Myopia of both eyes with astigmatism and presbyopia  Eyeglass Final Rx       Eyeglass Final Rx         Sphere Cylinder Axis Dist VA Add    Right -0.25 +0.50 175 20/20 +1.50    Left -0.75 Sphere  20/20 +1.50      Type: PAL    Expiration Date: 5/16/2025                  Reviewed PALs     RTC 1 yr

## 2024-05-23 ENCOUNTER — OFFICE VISIT (OUTPATIENT)
Dept: INTERNAL MEDICINE | Facility: CLINIC | Age: 49
End: 2024-05-23
Payer: COMMERCIAL

## 2024-05-23 VITALS
DIASTOLIC BLOOD PRESSURE: 68 MMHG | WEIGHT: 170.88 LBS | SYSTOLIC BLOOD PRESSURE: 122 MMHG | HEIGHT: 61 IN | OXYGEN SATURATION: 99 % | HEART RATE: 83 BPM | BODY MASS INDEX: 32.26 KG/M2

## 2024-05-23 DIAGNOSIS — Z00.00 WELLNESS EXAMINATION: ICD-10-CM

## 2024-05-23 DIAGNOSIS — G43.009 MIGRAINE WITHOUT AURA AND WITHOUT STATUS MIGRAINOSUS, NOT INTRACTABLE: Primary | ICD-10-CM

## 2024-05-23 PROCEDURE — 99999 PR PBB SHADOW E&M-EST. PATIENT-LVL IV: CPT | Mod: PBBFAC,,, | Performed by: INTERNAL MEDICINE

## 2024-05-23 PROCEDURE — 99396 PREV VISIT EST AGE 40-64: CPT | Mod: S$GLB,,, | Performed by: INTERNAL MEDICINE

## 2024-05-23 PROCEDURE — 3078F DIAST BP <80 MM HG: CPT | Mod: CPTII,S$GLB,, | Performed by: INTERNAL MEDICINE

## 2024-05-23 PROCEDURE — 3074F SYST BP LT 130 MM HG: CPT | Mod: CPTII,S$GLB,, | Performed by: INTERNAL MEDICINE

## 2024-05-23 PROCEDURE — 3008F BODY MASS INDEX DOCD: CPT | Mod: CPTII,S$GLB,, | Performed by: INTERNAL MEDICINE

## 2024-05-23 RX ORDER — LUBIPROSTONE 24 UG/1
24 CAPSULE ORAL 2 TIMES DAILY
Qty: 180 CAPSULE | Refills: 3 | Status: SHIPPED | OUTPATIENT
Start: 2024-05-23

## 2024-05-23 NOTE — PROGRESS NOTES
Subjective:       Patient ID: Nora Conklin is a 49 y.o. female.    Chief Complaint: Annual Exam    HPIPt is feeling well - No CP or SOB. Migraines are better. Still with chronic constipation.  Review of Systems   Respiratory:  Negative for shortness of breath (PND or orthopnea).    Cardiovascular:  Negative for chest pain (arm pain or jaw pain).   Gastrointestinal:  Negative for abdominal pain, diarrhea, nausea and vomiting.   Genitourinary:  Negative for dysuria.   Neurological:  Negative for seizures, syncope and headaches.       Objective:      Physical Exam  Constitutional:       General: She is not in acute distress.     Appearance: She is well-developed.   HENT:      Head: Normocephalic.   Eyes:      Pupils: Pupils are equal, round, and reactive to light.   Neck:      Thyroid: No thyromegaly.      Vascular: No JVD.   Cardiovascular:      Rate and Rhythm: Normal rate and regular rhythm.      Heart sounds: Normal heart sounds. No murmur heard.     No friction rub. No gallop.   Pulmonary:      Effort: Pulmonary effort is normal.      Breath sounds: Normal breath sounds. No wheezing or rales.   Abdominal:      General: Bowel sounds are normal. There is no distension.      Palpations: Abdomen is soft. There is no mass.      Tenderness: There is no abdominal tenderness. There is no guarding or rebound.   Musculoskeletal:      Cervical back: Neck supple.   Lymphadenopathy:      Cervical: No cervical adenopathy.   Skin:     General: Skin is warm and dry.   Neurological:      Mental Status: She is alert and oriented to person, place, and time.      Deep Tendon Reflexes: Reflexes are normal and symmetric.   Psychiatric:         Behavior: Behavior normal.         Thought Content: Thought content normal.         Judgment: Judgment normal.         Assessment:       1. Migraine without aura and without status migrainosus, not intractable    2. Wellness examination        Plan:   Migraine without aura and without status  migrainosus, not intractable  Improved  Wellness examination  -     CBC Auto Differential; Future; Expected date: 05/23/2024  -     Comprehensive Metabolic Panel; Future; Expected date: 05/23/2024  -     Lipid Panel; Future; Expected date: 05/23/2024  -     TSH; Future; Expected date: 05/23/2024  -     Hemoglobin A1C; Future; Expected date: 05/23/2024  -     Vitamin D; Future; Expected date: 05/23/2024  -     Urinalysis; Future; Expected date: 05/23/2024  -     Urine culture; Future; Expected date: 05/23/2024  -     US Thyroid; Future; Expected date: 05/23/2024  -     Ambulatory referral/consult to Endo Procedure ; Future; Expected date: 05/24/2024  -     Sedimentation rate; Future; Expected date: 05/23/2024  -     C-Reactive Protein; Future; Expected date: 05/23/2024    Other orders  -     lubiprostone (AMITIZA) 24 MCG Cap; Take 1 capsule (24 mcg total) by mouth 2 (two) times daily.  Dispense: 180 capsule; Refill: 3

## 2024-06-11 NOTE — TELEPHONE ENCOUNTER
No care due was identified.  Health [facility] Embedded Care Due Messages. Reference number: [id number].   6/11/2024 6:22:22 AM CDT

## 2024-06-18 ENCOUNTER — TELEPHONE (OUTPATIENT)
Dept: INTERNAL MEDICINE | Facility: CLINIC | Age: 49
End: 2024-06-18

## 2024-06-19 RX ORDER — DEXTROAMPHETAMINE SACCHARATE, AMPHETAMINE ASPARTATE MONOHYDRATE, DEXTROAMPHETAMINE SULFATE AND AMPHETAMINE SULFATE 3.75; 3.75; 3.75; 3.75 MG/1; MG/1; MG/1; MG/1
15 CAPSULE, EXTENDED RELEASE ORAL EVERY MORNING
Qty: 30 CAPSULE | Refills: 0 | Status: SHIPPED | OUTPATIENT
Start: 2024-06-19

## 2024-06-27 NOTE — PROGRESS NOTES
CC: Annual check-up    SUBJECTIVE:   48 y.o. female   for annual routine Pap and checkup. Patient's last menstrual period was 10/13/2023..  She has no unusual complaints and noticed some vaginal irritation recently.    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[facility] [employer]    Past Medical History:   Diagnosis Date    Medical history non-contributory     Migraine headache      Past Surgical History:   Procedure Laterality Date     SECTION       Social History     Socioeconomic History    Marital status: Single   Occupational History    Occupation: lpn    Tobacco Use    Smoking status: Never     Passive exposure: Never    Smokeless tobacco: Never   Substance and Sexual Activity    Alcohol use: Yes     Alcohol/week: 0.0 standard drinks of alcohol     Comment: rare    Drug use: No    Sexual activity: Yes     Partners: Male     Birth control/protection: None     Family History   Problem Relation Age of Onset    Cancer Maternal Grandmother         lung     Cervical cancer Mother     Emphysema Mother     Hypertension Sister     Valvular heart disease Maternal Aunt     Breast cancer Neg Hx     Colon cancer Neg Hx     Ovarian cancer Neg Hx      OB History    Para Term  AB Living   3 3 3     3   SAB IAB Ectopic Multiple Live Births                  # Outcome Date GA Lbr Jamar/2nd Weight Sex Delivery Anes PTL Lv   3 Term            2 Term            1 Term                  Current Outpatient Medications   Medication Sig Dispense Refill    amitriptyline (ELAVIL) 10 MG tablet Take 2 tablets by mouth every night around dinner time. 180 tablet 1    ASPIRIN/ACETAMINOPHEN/CAFFEINE (GOODY'S MIGRAINE RELIEF ORAL) Take by mouth.      dextroamphetamine-amphetamine (ADDERALL XR) 15 MG 24 hr capsule Take 1 capsule (15 mg total) by mouth every morning. 30 capsule 0    fluconazole (DIFLUCAN) 150 MG Tab Take 150 mg by mouth once.      ibuprofen (ADVIL,MOTRIN) 800 MG tablet Take 1 tablet (800 mg total) by  Left message for Dr. Zamora with regards to d/c pain meds; also left message at his office; awaiting call back   mouth 3 (three) times daily as needed for Pain. 90 tablet 1    lubiprostone (AMITIZA) 24 MCG Cap Take 1 capsule (24 mcg total) by mouth 2 (two) times daily. 180 capsule 3    polyethylene glycol (GLYCOLAX) 17 gram/dose powder Mix 1 capful (17 g) in liquid and take by mouth daily as needed. 510 g 2    rimegepant (NURTEC) 75 mg odt One tablet by mouth daily as needed for migraine max one tablet in 24 hours Place ODT tablet on the tongue; alternatively the ODT tablet may be placed under the tongue 30 tablet 1     Current Facility-Administered Medications   Medication Dose Route Frequency Provider Last Rate Last Admin    gentamicin injection 120 mg  120 mg Intramuscular 1 time in Clinic/HOD Tyesha Bauer MD         Allergies: Patient has no known allergies.     ROS:  Constitutional: no weight loss, weight gain, fever, fatigue  Eyes:  No vision changes, glasses/contacts  ENT/Mouth: No ulcers, sinus problems, ears ringing, headache  Cardiovascular: No inability to lie flat, chest pain, exercise intolerance, swelling, heart palpitations  Respiratory: No wheezing, coughing blood, shortness of breath, or cough  Gastrointestinal: No diarrhea, bloody stool, nausea/vomiting, constipation, gas, hemorrhoids  Genitourinary: No blood in urine, painful urination, urgency of urination, frequency of urination, incomplete emptying, incontinence, abnormal bleeding, painful periods, heavy periods, vaginal discharge, vaginal odor, painful intercourse, sexual problems, bleeding after intercourse.  Musculoskeletal: No muscle weakness  Skin/Breast: No painful breasts, nipple discharge, masses, rash, ulcers  Neurological: No passing out, seizures, numbness, headache  Endocrine: No diabetes, hypothyroid, hyperthyroid, hot flashes, hair loss, abnormal hair growth, ance  Psychiatric: No depression, crying  Hematologic: No bruises, bleeding, swollen lymph nodes, anemia.      OBJECTIVE:   The patient appears well, alert, oriented x 3, in no  "distress.  /80   Ht 5' 1" (1.549 m)   Wt 79.3 kg (174 lb 13.2 oz)   LMP 10/13/2023   BMI 33.03 kg/m²   NECK: no thyromegaly, trachea midline  SKIN: no acne, striae, hirsutism  BREAST EXAM: breasts appear normal, no suspicious masses, no skin or nipple changes or axillary nodes  ABDOMEN: no hernias, masses, or hepatosplenomegaly  GENITALIA: normal external genitalia, no erythema, no discharge  URETHRA: normal urethra, normal urethral meatus  VAGINA: vaginal discharge copious, white, and thick  CERVIX: no lesions or cervical motion tenderness  UTERUS: normal  ADNEXA: normal adnexa and no mass, fullness, tenderness      ASSESSMENT:   well woman  1. Well woman exam with routine gynecological exam    2. Vaginal irritation    3. Vaginal discharge        PLAN:   mammogram  pap smear  return annually or prn  Orders Placed This Encounter    C. trachomatis/N. gonorrhoeae by AMP DNA    Vaginosis Screen by DNA Probe    Mammo Digital Screening Bilat w/ Christian    Liquid-Based Pap Smear, Screening       "

## 2024-07-23 ENCOUNTER — TELEPHONE (OUTPATIENT)
Dept: ENDOSCOPY | Facility: HOSPITAL | Age: 49
End: 2024-07-23

## 2024-07-23 ENCOUNTER — CLINICAL SUPPORT (OUTPATIENT)
Dept: ENDOSCOPY | Facility: HOSPITAL | Age: 49
End: 2024-07-23
Attending: INTERNAL MEDICINE
Payer: COMMERCIAL

## 2024-07-23 VITALS — BODY MASS INDEX: 32.05 KG/M2 | HEIGHT: 61 IN | WEIGHT: 169.75 LBS

## 2024-07-23 DIAGNOSIS — Z00.00 WELLNESS EXAMINATION: ICD-10-CM

## 2024-07-23 RX ORDER — SODIUM, POTASSIUM,MAG SULFATES 17.5-3.13G
1 SOLUTION, RECONSTITUTED, ORAL ORAL DAILY
Qty: 354 ML | Refills: 0 | Status: SHIPPED | OUTPATIENT
Start: 2024-07-23 | End: 2024-07-25

## 2024-07-23 NOTE — TELEPHONE ENCOUNTER
Spoke to pt to schedule procedure(s) Colonoscopy       Physician to perform procedure(s) Dr. BOB Mcclelland  Date of Procedure (s) 10/21/24  Arrival Time 7:30 AM  Time of Procedure(s) 8:30 AM   Location of Procedure(s) [place] [address] Floor  Type of Rx Prep sent to patient: Suprep  Instructions provided to patient via [facility]    Patient was informed on the following information and verbalized understanding. Screening questionnaire reviewed with patient and complete. If procedure requires anesthesia, a responsible adult needs to be present to accompany the patient home, patient cannot drive after receiving anesthesia. Appointment details are tentative, especially check-in time. Patient will receive a prep-op call 7 days prior to confirm check-in time for procedure. If applicable the patient should contact their pharmacy to verify Rx for procedure prep is ready for pick-up. Patient was advised to call the scheduling department at [phone] if pharmacy states no Rx is available. Patient was advised to call the endoscopy scheduling department if any questions or concerns arise.      [facility] Scheduling Department

## 2024-08-08 ENCOUNTER — PATIENT OUTREACH (OUTPATIENT)
Dept: ADMINISTRATIVE | Facility: HOSPITAL | Age: 49
End: 2024-08-08
Payer: COMMERCIAL

## 2024-08-08 DIAGNOSIS — Z12.11 SCREENING FOR COLORECTAL CANCER: Primary | ICD-10-CM

## 2024-08-08 DIAGNOSIS — Z12.12 SCREENING FOR COLORECTAL CANCER: Primary | ICD-10-CM

## 2024-08-13 ENCOUNTER — PATIENT MESSAGE (OUTPATIENT)
Dept: INTERNAL MEDICINE | Facility: CLINIC | Age: 49
End: 2024-08-13

## 2024-08-13 ENCOUNTER — OFFICE VISIT (OUTPATIENT)
Dept: INTERNAL MEDICINE | Facility: CLINIC | Age: 49
End: 2024-08-13
Payer: COMMERCIAL

## 2024-08-13 DIAGNOSIS — E66.9 OBESITY, UNSPECIFIED CLASSIFICATION, UNSPECIFIED OBESITY TYPE, UNSPECIFIED WHETHER SERIOUS COMORBIDITY PRESENT: Primary | ICD-10-CM

## 2024-08-13 PROCEDURE — 99499 UNLISTED E&M SERVICE: CPT | Mod: 95,,,

## 2024-08-13 RX ORDER — SEMAGLUTIDE 0.5 MG/.5ML
0.5 INJECTION, SOLUTION SUBCUTANEOUS
Qty: 2 ML | Refills: 0 | Status: ACTIVE | OUTPATIENT
Start: 2024-09-13

## 2024-08-13 RX ORDER — SEMAGLUTIDE 1 MG/.5ML
1 INJECTION, SOLUTION SUBCUTANEOUS
Qty: 2 ML | Refills: 0 | Status: ACTIVE | OUTPATIENT
Start: 2024-10-13

## 2024-08-13 RX ORDER — SEMAGLUTIDE 0.25 MG/.5ML
0.25 INJECTION, SOLUTION SUBCUTANEOUS
Qty: 2 ML | Refills: 0 | Status: ACTIVE | OUTPATIENT
Start: 2024-08-13

## 2024-08-13 NOTE — PATIENT INSTRUCTIONS
WEGOVY® (wee-NIMA-vee), (semaglutide) injection, for subcutaneous use  Read this Medication Guide and Instructions for Use before you start using WEGOVY and each time you get a refill. There may be new information. This information does not take the place of talking to your healthcare provider about your medical condition or your treatment.  What is the most important information I should know about WEGOVY?   WEGOVY may cause serious side effects, including:   Possible thyroid tumors, including cancer. Tell your healthcare provider if you get a lump or swelling in your neck, hoarseness, trouble swallowing, or shortness of breath. These may be symptoms of thyroid cancer. In studies with rodents, WEGOVY and medicines that work like WEGOVY caused thyroid tumors, including thyroid cancer. It is not known if WEGOVY will cause thyroid tumors or a type of thyroid cancer called medullary thyroid carcinoma (MTC) in people.   Do not use WEGOVY if you or any of your family have ever had a type of thyroid cancer called medullary thyroid carcinoma (MTC), or if you have an endocrine system condition called Multiple Endocrine Neoplasia syndrome type 2 (MEN 2).  What is WEGOVY?   WEGOVY is an injectable prescription medicine used with a reduced calorie diet and increased physical activity:   to reduce the risk of major cardiovascular events such as death, heart attack, or stroke in adults with known heart disease and with either obesity or overweight.   that may help adults and children aged 12 years and older with obesity, or some adults with overweight who also have weight-related medical problems, to help them lose excess body weight and keep the weight off.   WEGOVY contains semaglutide and should not be used with other semaglutide-containing products or other GLP-1 receptor agonist medicines.  It is not known if WEGOVY is safe and effective for use in children under 12 years of age.  Do not use WEGOVY if:   you or any of your  family have ever had a type of thyroid cancer called medullary thyroid carcinoma (MTC) or if you have an endocrine system condition called Multiple Endocrine Neoplasia syndrome type 2 (MEN 2).   you have had a serious allergic reaction to semaglutide or any of the ingredients in WEGOVY. See the end of this Medication Guide for a complete list of ingredients in WEGOVY. Symptoms of a serious allergic reaction include:   swelling of your face, lips, tongue or throat   fainting or feeling dizzy   problems breathing or swallowing   very rapid heartbeat   severe rash or itching  Before using WEGOVY, tell your healthcare provider if you have any other medical conditions, including if you:   have or have had problems with your pancreas or kidneys.   have type 2 diabetes and a history of diabetic retinopathy.   have or have had depression or suicidal thoughts, or mental health issues.   are pregnant or plan to become pregnant. WEGOVY may harm your unborn baby. You should stop using WEGOVY 2 months before you plan to become pregnant.   Pregnancy Exposure Registry: There is a pregnancy exposure registry for women who use WEGOVY during pregnancy. The purpose of this registry is to collect information about the health of you and your baby. Talk to your healthcare provider about how you can take part in this registry or you may contact [employer] at 1-708.308.3891.   are breastfeeding or plan to breastfeed. It is not known if WEGOVY passes into your breast milk. You should talk with your healthcare provider about the best way to feed your baby while using WEGOVY.   Tell your healthcare provider about all the medicines you take, including prescription and over-the-counter medicines, vitamins, and herbal supplements. WEGOVY may affect the way some medicines work and some medicines may affect the way WEGOVY works. Tell your healthcare provider if you are taking other medicines to treat diabetes, including sulfonylureas or insulin.  WEGOVY slows stomach emptying and can affect medicines that need to pass through the stomach quickly.   Know the medicines you take. Keep a list of them to show your healthcare provider and pharmacist when you get a new medicine.  How should I use WEGOVY?   Read the Instructions for Use that comes with WEGOVY.   Use WEGOVY exactly as your healthcare provider tells you to.   Your healthcare provider should show you how to use WEGOVY before you use it for the first time.   WEGOVY is injected under the skin (subcutaneously) of your stomach (abdomen), thigh, or upper arm. Do not inject WEGOVY into a muscle (intramuscularly) or vein (intravenously).   Change (rotate) your injection site with each injection. Do not use the same site for each injection.   Use WEGOVY 1 time each week, on the same day each week, at any time of the day.   Start WEGOVY with 0.25 mg per week in your first month. In your second month, increase your weekly dose to 0.5 mg. In the third month, increase your weekly dose to 1 mg. In the fourth month, increase your weekly dose to 1.7 mg. In the fifth month onwards, your healthcare provider may either maintain your dose at 1.7 mg weekly or increase your weekly dose to 2.4 mg.   If you need to change the day of the week, you may do so as long as your last dose of WEGOVY was given 2 or more days before.   If you miss a dose of WEGOVY and the next scheduled dose is more than 2 days away (48 hours), take the missed dose as soon as possible. If you miss a dose of WEGOVY and the next schedule dose is less than 2 days away (48 hours), do not administer the dose. Take your next dose on the regularly scheduled day.   If you miss doses of WEGOVY for more than 2 weeks, take your next dose on the regularly scheduled day or call your healthcare provider to talk about how to restart your treatment.   You can take WEGOVY with or without food.   If you take too much WEGOVY, you may have severe nausea, severe vomiting  and severe low blood sugar. Call your healthcare provider or go to the nearest hospital emergency room right away if you experience any of these symptoms.  What are the possible side effects of WEGOVY?   WEGOVY may cause serious side effects, including:   See What is the most important information I should know about WEGOVY?   inflammation of your pancreas (pancreatitis). Stop using WEGOVY and call your healthcare provider right away if you have severe pain in your stomach area (abdomen) that will not go away, with or without vomiting. You may feel the pain from your abdomen to your back.   gallbladder problems. WEGOVY may cause gallbladder problems including gallstones. Some gallbladder problems need surgery. Call your healthcare provider if you have any of the following symptoms:   pain in your upper stomach (abdomen)   yellowing of skin or eyes (jaundice)   fever   kerri-colored stools   increased risk of low blood sugar (hypoglycemia), especially those who also take medicines to treat diabetes mellitus such as insulin or sulfonylureas. Low blood sugar in patients with diabetes who receive WEGOVY can be a serious side effect. Talk to your healthcare provider about how to recognize and treat low blood sugar. You should check your blood sugar before you start taking WEGOVY and while you take WEGOVY. Signs and symptoms of low blood sugar may include:   dizziness or light-headedness   sweating   shakiness   blurred vision   slurred speech   weakness   anxiety   hunger   headache   irritability or mood changes   confusion or drowsiness   fast heartbeat   feeling jittery  kidney problems (kidney failure). In people who have kidney problems, diarrhea, nausea, and vomiting may cause a loss of fluids (dehydration) which may cause kidney problems to get worse. It is important for you to drink fluids to help reduce your chance of dehydration.   serious allergic reactions. Stop using WEGOVY and get medical help right away,  if you have any symptoms of a serious allergic reaction including:   swelling of your face, lips, tongue   severe rash or itching o very rapid heartbeat or throat   problems breathing or swallowing fainting or feeling dizzy   change in vision in people with type 2 diabetes. Tell your healthcare provider if you have changes in vision during treatment with WEGOVY.   increased heart rate. WEGOVY can increase your heart rate while you are at rest. Your healthcare provider should check your heart rate while you take WEGOVY. Tell your healthcare provider if you feel your heart racing or pounding in your chest and it lasts for several minutes.   depression or thoughts of suicide. You should pay attention to any mental changes, especially sudden changes in your mood, behaviors, thoughts, or feelings. Call your healthcare provider right away if you have any mental changes that are new, worse, or worry you.   The most common side effects of WEGOVY in adults or children aged 12 years and older may include:  nausea   stomach (abdomen) pain   dizziness   gas   diarrhea   headache   feeling bloated   stomach flu   vomiting   tiredness (fatigue)   belching   heartburn   constipation   upset stomach   low blood sugar in people   runny nose or sore throat with type 2 diabetes   Talk to your healthcare provider about any side effect that bothers you or does not go away. These are not all the possible side effects of WEGOVY. Call your doctor for medical advice about side effects. You may report side effects to FDA at 3-503-FDA-6607.  General information about the safe and effective use of WEGOVY.  Medicines are sometimes prescribed for purposes other than those listed in a Medication Guide. Do not use WEGOVY for a condition for which it was not prescribed. Do not give WEGOVY to other people, even if they have the same symptoms that you have. It may harm them. You can ask your pharmacist or healthcare provider for information about  WEGOVY that is written for health professionals.  What are the ingredients in WEGOVY?   Active Ingredient: semaglutide   Inactive Ingredients: disodium phosphate dihydrate, 1.42 mg; sodium chloride, 8.25 mg; water for injection; and hydrochloric acid or sodium hydroxide may be added to adjust pH.  For more information, go to [employer] or call 1-483-Ixpxey-8.

## 2024-08-13 NOTE — PROGRESS NOTES
Patient ID: Nora Conklin is a 49 y.o. Black or  female    Subjective  Chief Complaint: patient presents for medical weight loss management.    Contraindications to GLP-1 receptor agonist therapy:   Denies personal or family history of MTC, personal history of MEN2, history of allergic reaction while taking a GLP-1 receptor agonist, and history of pancreatitis while taking a GLP-1 receptor agonist     Co-morbidities: none      Weight loss history:  Starting weight:    7/31/2024   Recent Readings    Weight (lbs) 170 lb    BMI 32.12 BMI          Objective  Lab Results   Component Value Date     (L) 09/29/2023     08/15/2023     (L) 07/08/2022     Lab Results   Component Value Date    K 3.8 09/29/2023    K 3.5 08/15/2023    K 3.6 07/08/2022     Lab Results   Component Value Date     09/29/2023     08/15/2023     07/08/2022     Lab Results   Component Value Date    CO2 23 09/29/2023    CO2 23 08/15/2023    CO2 24 07/08/2022     Lab Results   Component Value Date    BUN 16 09/29/2023    BUN 14 08/15/2023    BUN 12 07/08/2022     Lab Results   Component Value Date     09/29/2023    GLU 87 08/15/2023    GLU 86 07/08/2022     Lab Results   Component Value Date    CALCIUM 8.9 09/29/2023    CALCIUM 8.5 (L) 08/15/2023    CALCIUM 8.4 (L) 07/08/2022     Lab Results   Component Value Date    PROT 8.0 09/29/2023    PROT 7.2 08/15/2023    PROT 7.2 06/30/2022     Lab Results   Component Value Date    ALBUMIN 4.1 09/29/2023    ALBUMIN 3.5 08/15/2023    ALBUMIN 3.5 06/30/2022     Lab Results   Component Value Date    BILITOT 0.6 09/29/2023    BILITOT 0.5 08/15/2023    BILITOT 0.7 06/30/2022     Lab Results   Component Value Date    AST 29 09/29/2023    AST 15 08/15/2023    AST 16 06/30/2022     Lab Results   Component Value Date    ALT 20 09/29/2023    ALT 13 08/15/2023    ALT 10 06/30/2022     Lab Results   Component Value Date    ANIONGAP 9 09/29/2023    ANIONGAP 12  08/15/2023    ANIONGAP 6 (L) 07/08/2022     Lab Results   Component Value Date    CREATININE 0.62 09/29/2023    CREATININE 0.8 08/15/2023    CREATININE 0.7 07/08/2022     Lab Results   Component Value Date    EGFRNORACEVR >60.0 09/29/2023    EGFRNORACEVR >60.0 08/15/2023         Assessment/Plan  -Pt qualifies for GLP-1 RA therapy based on BMI greater than or equal to 30 kg/m2  -Initiate Wegovy 0.25 mg once weekly for 1 month  -Then increase to Wegovy 0.5 mg once weekly for 1 month  -Then increase to Wegovy 1 mg once weekly  -RTC in 3 months       Patient consented to pharmacist management via collaborative practice.

## 2024-09-03 ENCOUNTER — TELEPHONE (OUTPATIENT)
Dept: INTERNAL MEDICINE | Facility: CLINIC | Age: 49
End: 2024-09-03
Payer: COMMERCIAL

## 2024-09-03 RX ORDER — DEXTROAMPHETAMINE SACCHARATE, AMPHETAMINE ASPARTATE MONOHYDRATE, DEXTROAMPHETAMINE SULFATE AND AMPHETAMINE SULFATE 3.75; 3.75; 3.75; 3.75 MG/1; MG/1; MG/1; MG/1
15 CAPSULE, EXTENDED RELEASE ORAL EVERY MORNING
Qty: 30 CAPSULE | Refills: 0 | Status: SHIPPED | OUTPATIENT
Start: 2024-09-03

## 2024-09-03 RX ORDER — IBUPROFEN 800 MG/1
800 TABLET ORAL 3 TIMES DAILY PRN
Qty: 90 TABLET | Refills: 1 | Status: SHIPPED | OUTPATIENT
Start: 2024-09-03

## 2024-09-05 PROBLEM — E66.9 OBESITY, UNSPECIFIED: Status: ACTIVE | Noted: 2024-09-05

## 2024-10-13 ENCOUNTER — ANESTHESIA EVENT (OUTPATIENT)
Dept: ENDOSCOPY | Facility: HOSPITAL | Age: 49
End: 2024-10-13
Payer: COMMERCIAL

## 2024-10-13 NOTE — ANESTHESIA PREPROCEDURE EVALUATION
10/13/2024  Nora Conklin is a 49 y.o., female.  [facility]  Anesthesia Pre-Operative Evaluation       Patient Name: Nora Conklin  YOB: 1975  MRN: [id number]  CSN: [id number]      Code Status: No Order   Date of Procedure: 10/21/2024  Anesthesia: Choice Procedure: Procedure(s) (LRB):  COLONOSCOPY (N/A)  Pre-Operative Diagnosis: Wellness examination [Z00.00]  Proceduralist: Surgeons and Role:     * Yelena Mcclelland MD - Primary Nurse: (Unknown)      SUBJECTIVE:   Nora Conklin is a 49 y.o. female who  has a past medical history of Medical history non-contributory and Migraine headache..     she has a current medication list which includes the following long-term medication(s): dextroamphetamine-amphetamine and venlafaxine.     ALLERGIES:   Review of patient's allergies indicates:  No Known Allergies  LDA:          Lines/Drains/Airways       None                  Anesthesia Evaluation      Airway   Mallampati: II  TM distance: Normal  Dental      Pulmonary    Cardiovascular     Rate: Normal    Neuro/Psych    (+) headaches    GI/Hepatic/Renal      Endo/Other    Abdominal                   MEDICATIONS:     Antibiotics (From admission, onward)      None          VTE Risk Mitigation (From admission, onward)      None              Current Facility-Administered Medications   Medication Dose Route Frequency Provider Last Rate Last Admin    gentamicin injection 120 mg  120 mg Intramuscular 1 time in Clinic/HOD Tyesha Bauer MD         Current Outpatient Medications   Medication Sig Dispense Refill    ASPIRIN/ACETAMINOPHEN/CAFFEINE (GOODY'S MIGRAINE RELIEF ORAL) Take by mouth.      dextroamphetamine-amphetamine (ADDERALL XR) 15 MG 24 hr capsule Take 1 capsule (15 mg total) by mouth every morning. 30 capsule 0    fluconazole (DIFLUCAN) 150 MG Tab Take 150 mg by  mouth once. (Patient not taking: Reported on 2024)      ibuprofen (ADVIL,MOTRIN) 800 MG tablet Take 1 tablet (800 mg total) by mouth 3 (three) times daily as needed for Pain. 90 tablet 1    lubiprostone (AMITIZA) 24 MCG Cap Take 1 capsule (24 mcg total) by mouth 2 (two) times daily. 180 capsule 3    metroNIDAZOLE (FLAGYL) 500 MG tablet Take 1 tablet (500 mg total) by mouth every 12 (twelve) hours. 14 tablet 0    polyethylene glycol (GLYCOLAX) 17 gram/dose powder Mix 1 capful (17 g) in liquid and take by mouth daily as needed. 510 g 2    rimegepant (NURTEC) 75 mg odt One tablet by mouth daily as needed for migraine max one tablet in 24 hours Place ODT tablet on the tongue; alternatively the ODT tablet may be placed under the tongue 30 tablet 1    semaglutide, weight loss, (WEGOVY) 1 mg/0.5 mL PnIj Inject 1 mg into the skin every 7 days. 2 mL 0    terconazole (TERAZOL 3) 0.8 % vaginal cream Place 1 applicator vaginally every evening. 20 g 1    venlafaxine (EFFEXOR-XR) 37.5 MG 24 hr capsule Take 1 capsule (37.5 mg total) by mouth once daily. (Patient not taking: Reported on 2024) 30 capsule 11          History:   There are no hospital problems to display for this patient.    Surgical History:    has a past surgical history that includes  section.   Social History:    reports being sexually active and has had partner(s) who are male. She reports using the following method of birth control/protection: None.  reports that she has never smoked. She has never been exposed to tobacco smoke. She has never used smokeless tobacco. She reports current alcohol use. She reports that she does not use drugs.     OBJECTIVE:     Vital Signs (Most Recent):    Vital Signs Range (Last 24H):          There is no height or weight on file to calculate BMI.   Wt Readings from Last 4 Encounters:   24 77 kg (169 lb 12.1 oz)   24 77.5 kg (170 lb 13.7 oz)   24 76.5 kg (168 lb 10.4 oz)   23 79.3  "kg (174 lb 13.2 oz)       Significant Labs:  Lab Results   Component Value Date    WBC 7.28 09/29/2023    HGB 12.0 09/29/2023    HCT 36.9 (L) 09/29/2023     09/29/2023     (L) 09/29/2023    K 3.8 09/29/2023     09/29/2023    CREATININE 0.62 09/29/2023    BUN 16 09/29/2023    CO2 23 09/29/2023     09/29/2023    CALCIUM 8.9 09/29/2023    ALKPHOS 59 09/29/2023    ALT 20 09/29/2023    AST 29 09/29/2023    ALBUMIN 4.1 09/29/2023    HGBA1C 5.3 08/15/2023    TROPONINI <0.012 09/29/2023     No LMP recorded.  No results found for this or any previous visit (from the past 72 hours).    EKG:   Results for orders placed or performed during the hospital encounter of 09/29/23   EKG 12-lead    Collection Time: 09/29/23  7:34 PM    Narrative    Test Reason : R07.9,    Vent. Rate : 084 BPM     Atrial Rate : 084 BPM     P-R Int : 180 ms          QRS Dur : 076 ms      QT Int : 360 ms       P-R-T Axes : 076 067 064 degrees     QTc Int : 425 ms    Normal sinus rhythm  Septal infarct (cited on or before 30-JUN-2022)  Abnormal ECG  When compared with ECG of 30-JUN-2022 15:34,  No significant change was found  Confirmed by Abiodun Hall MD ([id number]) on 10/3/2023 5:18:47 PM    Referred By: [email or web address]   SELF           Confirmed By:Abiodun Hall MD       TTE:  No results found for this or any previous visit.  No results found for: "EF"   No results found for this or any previous visit.  DEBBIE:  No results found for this or any previous visit.  Stress Test:  No results found for this or any previous visit.     LHC:  No results found for this or any previous visit.     PFT:  No results found for: "FEV1", "FVC", "WOZ4KTY", "TLC", "DLCO"     ASSESSMENT/PLAN:        Pre-op Assessment    I have reviewed the Patient Summary Reports.     I have reviewed the Nursing Notes. I have reviewed the NPO Status.   I have reviewed the Medications.     Review of Systems  Anesthesia Hx:  No problems with previous Anesthesia             " Denies Family Hx of Anesthesia complications.    Denies Personal Hx of Anesthesia complications.                    Hematology/Oncology:  Hematology Normal   Oncology Normal                                   EENT/Dental:  EENT/Dental Normal           Cardiovascular:  Cardiovascular Normal                                              Pulmonary:  Pulmonary Normal                       Renal/:  Renal/ Normal                 Hepatic/GI:  Hepatic/GI Normal                    Musculoskeletal:  Musculoskeletal Normal                Neurological:      Headaches                                 Endocrine:  Endocrine Normal          Obesity / BMI > 30  Dermatological:  Skin Normal    Psych:  Psychiatric Normal                  Physical Exam  General: Well nourished    Airway:  Mallampati: II   Mouth Opening: Normal  TM Distance: Normal    Chest/Lungs:  Normal Respiratory Rate    Heart:  Rate: Normal    Anesthesia Plan  Type of Anesthesia, risks & benefits discussed:    Anesthesia Type: Gen Natural Airway  Intra-op Monitoring Plan: Standard ASA Monitors  Post Op Pain Control Plan: multimodal analgesia  Induction:  IV  Informed Consent: Informed consent signed with the Patient and all parties understand the risks and agree with anesthesia plan.  All questions answered.   ASA Score: 2  Day of Surgery Review of History & Physical: H&P Update referred to the surgeon/provider.    Ready For Surgery From Anesthesia Perspective.     .

## 2024-10-14 ENCOUNTER — TELEPHONE (OUTPATIENT)
Dept: ENDOSCOPY | Facility: HOSPITAL | Age: 49
End: 2024-10-14
Payer: COMMERCIAL

## 2024-10-14 NOTE — TELEPHONE ENCOUNTER
Spoke to patient for pre-call to confirm scheduled Colonoscopy and patient verbalized understanding of the following:       Date & arrival time of procedure(s) verified 10/21/24, 7:30 AM.  Location of procedure(s) [facility]nd Floor verified.  NPO status reinforced. Ok to continue clear liquids until 6:30 AM.   Patient denies use of blood thinners.  Patient is taking Wegovy / Ozempic (Semaglutide), instructed to take last dose on/before 10/13/24. Patient states last dose was 10/6/24 and verbalized understanding not to take again prior to procedure.  Patient confirmed receipt of prep instructions and Rx prep.  Instructions provided to patient via [facility].  Patient confirmed ride home after procedure if procedure requires anesthesia.   Pre-call screening questionnaire reviewed and completed with patient.   Appointment details are tentative, including check-in time.  If the patient begins taking any blood thinning medications, injectable weight loss/diabetes medications (other than insulin), or Adipex (phentermine) patient was instructed to contact the endoscopy scheduling department as soon as possible.  Patient was advised to call the endoscopy scheduling department if any questions or concerns arise.     [facility]oscopy Scheduling Department

## 2024-10-18 NOTE — H&P
Short Stay Endoscopy History and Physical    PCP - Tyesha Bauer MD  Referring Physician - PRE-ADMIT NURSE 1, ENDO -[facility]  No address on file    Procedure - Colonoscopy  ASA - per anesthesia  Mallampati - per anesthesia  History of Anesthesia problems - no  Family history Anesthesia problems -  no   Plan of anesthesia - General    HPI  49 y.o. female  Reason for procedure:   Wellness examination [Z00.00]  Screen for colon cancer [Z12.11]        ROS:  Constitutional: No fevers, chills, No weight loss  CV: No chest pain  Pulm: No cough, No shortness of breath  GI: see HPI    Medical History:  has a past medical history of Medical history non-contributory and Migraine headache.    Surgical History:  has a past surgical history that includes  section.    Family History: family history includes Cancer in her maternal grandmother; Cervical cancer in her mother; Emphysema in her mother; Hypertension in her sister; Valvular heart disease in her maternal aunt..    Social History:  reports that she has never smoked. She has never been exposed to tobacco smoke. She has never used smokeless tobacco. She reports current alcohol use. She reports that she does not use drugs.    Review of patient's allergies indicates:  No Known Allergies    Medications:   Facility-Administered Medications Prior to Admission   Medication Dose Route Frequency Provider Last Rate Last Admin    gentamicin injection 120 mg  120 mg Intramuscular 1 time in Clinic/HOD Tyesha Bauer MD         Medications Prior to Admission   Medication Sig Dispense Refill Last Dose/Taking    ASPIRIN/ACETAMINOPHEN/CAFFEINE (GOODY'S MIGRAINE RELIEF ORAL) Take by mouth.   Past Month    dextroamphetamine-amphetamine (ADDERALL XR) 15 MG 24 hr capsule Take 1 capsule (15 mg total) by mouth every morning. 30 capsule 0 Past Week    ibuprofen (ADVIL,MOTRIN) 800 MG tablet Take 1 tablet (800 mg total) by mouth 3 (three) times daily as needed  for Pain. 90 tablet 1 10/20/2024    lubiprostone (AMITIZA) 24 MCG Cap Take 1 capsule (24 mcg total) by mouth 2 (two) times daily. 180 capsule 3 Past Month    rimegepant (NURTEC) 75 mg odt One tablet by mouth daily as needed for migraine max one tablet in 24 hours Place ODT tablet on the tongue; alternatively the ODT tablet may be placed under the tongue 30 tablet 1 Past Month    fluconazole (DIFLUCAN) 150 MG Tab Take 150 mg by mouth once. (Patient not taking: Reported on 8/13/2024)       metroNIDAZOLE (FLAGYL) 500 MG tablet Take 1 tablet (500 mg total) by mouth every 12 (twelve) hours. 14 tablet 0     polyethylene glycol (GLYCOLAX) 17 gram/dose powder Mix 1 capful (17 g) in liquid and take by mouth daily as needed. 510 g 2     semaglutide, weight loss, (WEGOVY) 1 mg/0.5 mL PnIj Inject 1 mg into the skin every 7 days. 2 mL 0 10/6/2024    terconazole (TERAZOL 3) 0.8 % vaginal cream Place 1 applicator vaginally every evening. 20 g 1     venlafaxine (EFFEXOR-XR) 37.5 MG 24 hr capsule Take 1 capsule (37.5 mg total) by mouth once daily. (Patient not taking: Reported on 8/13/2024) 30 capsule 11        Physical Exam:    Vital Signs:   Vitals:    10/21/24 0752   BP: 115/64   Pulse: 100   Resp: 16   Temp: 99 °F (37.2 °C)       General Appearance: Well appearing in no acute distress  Abdomen: Soft, non tender, non distended with normal bowel sounds, no masses    Labs:  Lab Results   Component Value Date    WBC 7.28 09/29/2023    HGB 12.0 09/29/2023    HCT 36.9 (L) 09/29/2023     09/29/2023    CHOL 203 (H) 08/15/2023    TRIG 53 08/15/2023    HDL 51 08/15/2023    ALT 20 09/29/2023    AST 29 09/29/2023     (L) 09/29/2023    K 3.8 09/29/2023     09/29/2023    CREATININE 0.62 09/29/2023    BUN 16 09/29/2023    CO2 23 09/29/2023    TSH 2.039 08/15/2023    HGBA1C 5.3 08/15/2023       I have explained the risks and benefits of this endoscopic procedure to the patient including but not limited to bleeding,  inflammation, infection, perforation, and death.    Assessment/Plan:     CRC Screening     - Proceed with colonoscopy     Yelena Mcclelland MD  Gastroenterology   [facility]

## 2024-10-21 ENCOUNTER — ANESTHESIA (OUTPATIENT)
Dept: ENDOSCOPY | Facility: HOSPITAL | Age: 49
End: 2024-10-21
Payer: COMMERCIAL

## 2024-10-21 ENCOUNTER — HOSPITAL ENCOUNTER (OUTPATIENT)
Facility: HOSPITAL | Age: 49
Discharge: HOME | End: 2024-10-21
Attending: STUDENT IN AN ORGANIZED HEALTH CARE EDUCATION/TRAINING PROGRAM | Admitting: STUDENT IN AN ORGANIZED HEALTH CARE EDUCATION/TRAINING PROGRAM
Payer: COMMERCIAL

## 2024-10-21 VITALS
HEART RATE: 90 BPM | DIASTOLIC BLOOD PRESSURE: 73 MMHG | RESPIRATION RATE: 16 BRPM | BODY MASS INDEX: 30.91 KG/M2 | WEIGHT: 168 LBS | HEIGHT: 62 IN | TEMPERATURE: 98 F | OXYGEN SATURATION: 100 % | SYSTOLIC BLOOD PRESSURE: 124 MMHG

## 2024-10-21 DIAGNOSIS — Z12.11 COLON CANCER SCREENING: Primary | ICD-10-CM

## 2024-10-21 PROCEDURE — 27201012 HC FORCEPS, HOT/COLD, DISP: Performed by: STUDENT IN AN ORGANIZED HEALTH CARE EDUCATION/TRAINING PROGRAM

## 2024-10-21 PROCEDURE — 99900035 HC TECH TIME PER 15 MIN (STAT)

## 2024-10-21 PROCEDURE — 94761 N-INVAS EAR/PLS OXIMETRY MLT: CPT

## 2024-10-21 PROCEDURE — 88305 TISSUE EXAM BY PATHOLOGIST: CPT | Mod: 26,,, | Performed by: PATHOLOGY

## 2024-10-21 PROCEDURE — 45380 COLONOSCOPY AND BIOPSY: CPT | Mod: 33 | Performed by: STUDENT IN AN ORGANIZED HEALTH CARE EDUCATION/TRAINING PROGRAM

## 2024-10-21 PROCEDURE — 37000009 HC ANESTHESIA EA ADD 15 MINS: Performed by: STUDENT IN AN ORGANIZED HEALTH CARE EDUCATION/TRAINING PROGRAM

## 2024-10-21 PROCEDURE — 37000008 HC ANESTHESIA 1ST 15 MINUTES: Performed by: STUDENT IN AN ORGANIZED HEALTH CARE EDUCATION/TRAINING PROGRAM

## 2024-10-21 PROCEDURE — 45380 COLONOSCOPY AND BIOPSY: CPT | Mod: 33,,, | Performed by: STUDENT IN AN ORGANIZED HEALTH CARE EDUCATION/TRAINING PROGRAM

## 2024-10-21 PROCEDURE — D9220A PRA ANESTHESIA: Mod: ,,, | Performed by: NURSE ANESTHETIST, CERTIFIED REGISTERED

## 2024-10-21 PROCEDURE — 63600175 PHARM REV CODE 636 W HCPCS: Performed by: ANESTHESIOLOGY

## 2024-10-21 PROCEDURE — 88305 TISSUE EXAM BY PATHOLOGIST: CPT | Performed by: PATHOLOGY

## 2024-10-21 RX ORDER — LIDOCAINE HYDROCHLORIDE 20 MG/ML
INJECTION, SOLUTION EPIDURAL; INFILTRATION; INTRACAUDAL; PERINEURAL
Status: DISCONTINUED | OUTPATIENT
Start: 2024-10-21 | End: 2024-10-21

## 2024-10-21 RX ORDER — PROPOFOL 10 MG/ML
VIAL (ML) INTRAVENOUS
Status: DISCONTINUED | OUTPATIENT
Start: 2024-10-21 | End: 2024-10-21

## 2024-10-21 RX ORDER — PROPOFOL 10 MG/ML
VIAL (ML) INTRAVENOUS CONTINUOUS PRN
Status: DISCONTINUED | OUTPATIENT
Start: 2024-10-21 | End: 2024-10-21

## 2024-10-21 RX ADMIN — PROPOFOL 200 MCG/KG/MIN: 10 INJECTION, EMULSION INTRAVENOUS at 08:10

## 2024-10-21 RX ADMIN — LIDOCAINE HYDROCHLORIDE 80 MG: 20 INJECTION, SOLUTION EPIDURAL; INFILTRATION; INTRACAUDAL; PERINEURAL at 08:10

## 2024-10-21 RX ADMIN — PROPOFOL 60 MG: 10 INJECTION, EMULSION INTRAVENOUS at 08:10

## 2024-10-21 RX ADMIN — GLYCOPYRROLATE 0.2 MG: 0.2 INJECTION, SOLUTION INTRAMUSCULAR; INTRAVENOUS at 08:10

## 2024-10-21 NOTE — PLAN OF CARE
Pt arrived to recovery dosc via stretcher per ENDO team. Bedside report received. Pt attached to bedside monitor. VSS. Pt resting without distress post procedure. Pt on room air; oxygen sats 99%. Pt IV access clean, dry and intact. Safety maijntained.

## 2024-10-21 NOTE — ANESTHESIA POSTPROCEDURE EVALUATION
Anesthesia Post Evaluation    Patient: Nora Conklin    Procedure(s) Performed: Procedure(s) (LRB):  COLONOSCOPY, SCREENING, LOW RISK PATIENT (N/A)    Final Anesthesia Type: general      Patient location during evaluation: PACU  Patient participation: Yes- Able to Participate  Level of consciousness: awake and alert  Post-procedure vital signs: reviewed and stable  Pain management: adequate  Airway patency: patent    PONV status at discharge: No PONV  Anesthetic complications: no      Cardiovascular status: stable  Respiratory status: spontaneous ventilation  Hydration status: euvolemic  Follow-up not needed.          Vitals Value Taken Time   /73 10/21/24 0929   Temp 36.6 °C (97.8 °F) 10/21/24 0909   Pulse 90 10/21/24 0929   Resp 16 10/21/24 0929   SpO2 100 % 10/21/24 0929         Event Time   Out of Recovery 09:24:00         Pain/Jony Score: Jony Score: 10 (10/21/2024  9:24 AM)

## 2024-10-21 NOTE — PROVATION PATIENT INSTRUCTIONS
Discharge Summary/Instructions after an Endoscopic Procedure  Patient Name: Nora Conklin  Patient MRN: [id number]  Patient YOB: 1975 Monday, October 21, 2024  Yelena Mcclelland MD  Dear patient,  As a result of recent federal legislation (The Federal Cures Act), you may   receive lab or pathology results from your procedure in your [facility]   account before your physician is able to contact you. Your physician or   their representative will relay the results to you with their   recommendations at their soonest availability.  Thank you,  RESTRICTIONS:  During your procedure today, you received medications for sedation.  These   medications may affect your judgment, balance and coordination.  Therefore,   for 24 hours, you have the following restrictions:   - DO NOT drive a car, operate machinery, make legal/financial decisions,   sign important papers or drink alcohol.    ACTIVITY:  Today: no heavy lifting, straining or running due to procedural   sedation/anesthesia.  The following day: return to full activity including work.  DIET:  Eat and drink normally unless instructed otherwise.     TREATMENT FOR COMMON SIDE EFFECTS:  - Mild abdominal pain, nausea, belching, bloating or excessive gas:  rest,   eat lightly and use a heating pad.  - Sore Throat: treat with throat lozenges and/or gargle with warm salt   water.  - Because air was used during the procedure, expelling large amounts of air   from your rectum or belching is normal.  - If a bowel prep was taken, you may not have a bowel movement for 1-3 days.    This is normal.  SYMPTOMS TO WATCH FOR AND REPORT TO YOUR PHYSICIAN:  1. Abdominal pain or bloating, other than gas cramps.  2. Chest pain.  3. Back pain.  4. Signs of infection such as: chills or fever occurring within 24 hours   after the procedure.  5. Rectal bleeding, which would show as bright red, maroon, or black stools.   (A tablespoon of blood from the rectum is not serious, especially  if   hemorrhoids are present.)  6. Vomiting.  7. Weakness or dizziness.  GO DIRECTLY TO THE NEAREST EMERGENCY ROOM IF YOU HAVE ANY OF THE FOLLOWING:      Difficulty breathing              Chills and/or fever over 101 F   Persistent vomiting and/or vomiting blood   Severe abdominal pain   Severe chest pain   Black, tarry stools   Bleeding- more than one tablespoon   Any other symptom or condition that you feel may need urgent attention  Your doctor recommends these additional instructions:  If any biopsies were taken, your doctors clinic will contact you in 1 to 2   weeks with any results.  - Discharge patient to home (ambulatory).   - Resume regular diet.   - Continue present medications.   - Await pathology results.   - Repeat colonoscopy in 7 years for surveillance.  For questions, problems or results please call your physician - Yelena Mcclelland MD at Work:  [phone].  [facility], EMERGENCY ROOM PHONE NUMBER: [phone]  IF A COMPLICATION OR EMERGENCY SITUATION ARISES AND YOU ARE UNABLE TO REACH   YOUR PHYSICIAN - GO DIRECTLY TO THE EMERGENCY ROOM.  Yelena Mcclelland MD  10/21/2024 9:12:29 AM  This report has been verified and signed electronically.  Dear patient,  As a result of recent federal legislation (The Federal Cures Act), you may   receive lab or pathology results from your procedure in your [facility]   account before your physician is able to contact you. Your physician or   their representative will relay the results to you with their   recommendations at their soonest availability.  Thank you,  [employer]ATION

## 2024-10-21 NOTE — TRANSFER OF CARE
"Anesthesia Transfer of Care Note    Patient: Nora Conklin    Procedure(s) Performed: Procedure(s) (LRB):  COLONOSCOPY, SCREENING, LOW RISK PATIENT (N/A)    Patient location: GI    Anesthesia Type: general    Transport from OR: Transported from OR on room air with adequate spontaneous ventilation    Post pain: adequate analgesia    Post assessment: no apparent anesthetic complications    Post vital signs: stable    Level of consciousness: awake, alert and oriented    Nausea/Vomiting: no nausea/vomiting    Complications: none    Transfer of care protocol was followed      Last vitals: Visit Vitals  /64 (BP Location: Left arm, Patient Position: Lying)   Pulse 100   Temp 37.2 °C (99 °F) (Temporal)   Resp 16   Ht 5' 2" (1.575 m)   Wt 76.2 kg (168 lb)   SpO2 99%   Breastfeeding No   BMI 30.73 kg/m²     "

## 2024-10-23 LAB
FINAL PATHOLOGIC DIAGNOSIS: NORMAL
GROSS: NORMAL
Lab: NORMAL

## 2024-10-29 DIAGNOSIS — F98.8 ATTENTION DEFICIT DISORDER, UNSPECIFIED TYPE: Primary | ICD-10-CM

## 2024-10-30 RX ORDER — DEXTROAMPHETAMINE SACCHARATE, AMPHETAMINE ASPARTATE MONOHYDRATE, DEXTROAMPHETAMINE SULFATE AND AMPHETAMINE SULFATE 3.75; 3.75; 3.75; 3.75 MG/1; MG/1; MG/1; MG/1
15 CAPSULE, EXTENDED RELEASE ORAL EVERY MORNING
Qty: 30 CAPSULE | Refills: 0 | Status: SHIPPED | OUTPATIENT
Start: 2024-10-30

## 2024-10-31 DIAGNOSIS — E66.9 OBESITY, UNSPECIFIED CLASSIFICATION, UNSPECIFIED OBESITY TYPE, UNSPECIFIED WHETHER SERIOUS COMORBIDITY PRESENT: ICD-10-CM

## 2024-10-31 RX ORDER — SEMAGLUTIDE 1 MG/.5ML
1 INJECTION, SOLUTION SUBCUTANEOUS
Qty: 2 ML | Refills: 0 | Status: ACTIVE | OUTPATIENT
Start: 2024-10-31

## 2024-11-12 NOTE — PROGRESS NOTES
Patient ID: Nora Conklin is a 49 y.o. Black or  female    Subjective  Chief Complaint: patient presents for medical weight loss management.    Co-morbidities: none    HPI: Patient started Wegovy with [facility] in August 2024 and is currently managed on Wegovy 1 mg. Pt has completed 3 doses of this strength.    Tolerance to current therapy:  Denies vomiting, diarrhea, constipation, abdominal pain  Endorses nausea    Weight loss history:  Starting weight:    7/31/2024   Recent Readings    Weight (lbs) 170 lb    BMI 32.12 BMI    Current weight:    11/5/2024   Recent Readings    Weight (lbs) 165 lb    BMI 31.17 BMI    % weight loss since GLP-1 initiation: 2.9 %    Objective  Lab Results   Component Value Date     (L) 09/29/2023     08/15/2023     (L) 07/08/2022     Lab Results   Component Value Date    K 3.8 09/29/2023    K 3.5 08/15/2023    K 3.6 07/08/2022     Lab Results   Component Value Date     09/29/2023     08/15/2023     07/08/2022     Lab Results   Component Value Date    CO2 23 09/29/2023    CO2 23 08/15/2023    CO2 24 07/08/2022     Lab Results   Component Value Date    BUN 16 09/29/2023    BUN 14 08/15/2023    BUN 12 07/08/2022     Lab Results   Component Value Date     09/29/2023    GLU 87 08/15/2023    GLU 86 07/08/2022     Lab Results   Component Value Date    CALCIUM 8.9 09/29/2023    CALCIUM 8.5 (L) 08/15/2023    CALCIUM 8.4 (L) 07/08/2022     Lab Results   Component Value Date    PROT 8.0 09/29/2023    PROT 7.2 08/15/2023    PROT 7.2 06/30/2022     Lab Results   Component Value Date    ALBUMIN 4.1 09/29/2023    ALBUMIN 3.5 08/15/2023    ALBUMIN 3.5 06/30/2022     Lab Results   Component Value Date    BILITOT 0.6 09/29/2023    BILITOT 0.5 08/15/2023    BILITOT 0.7 06/30/2022     Lab Results   Component Value Date    AST 29 09/29/2023    AST 15 08/15/2023    AST 16 06/30/2022     Lab Results   Component Value Date    ALT 20  09/29/2023    ALT 13 08/15/2023    ALT 10 06/30/2022     Lab Results   Component Value Date    ANIONGAP 9 09/29/2023    ANIONGAP 12 08/15/2023    ANIONGAP 6 (L) 07/08/2022     Lab Results   Component Value Date    CREATININE 0.62 09/29/2023    CREATININE 0.8 08/15/2023    CREATININE 0.7 07/08/2022     Lab Results   Component Value Date    EGFRNORACEVR >60.0 09/29/2023    EGFRNORACEVR >60.0 08/15/2023     Assessment/Plan  - Increase to Wegovy 1.7 mg x 4 weeks once completed with Wegovy 1 mg  - Then increase to Wegovy 2.4 mg SQ weekly  - RTC in 3 months for follow-up evaluation    Patient consented to pharmacist management via collaborative practice.

## 2024-11-13 ENCOUNTER — PATIENT MESSAGE (OUTPATIENT)
Dept: INTERNAL MEDICINE | Facility: CLINIC | Age: 49
End: 2024-11-13

## 2024-11-13 ENCOUNTER — OFFICE VISIT (OUTPATIENT)
Dept: INTERNAL MEDICINE | Facility: CLINIC | Age: 49
End: 2024-11-13
Payer: COMMERCIAL

## 2024-11-13 DIAGNOSIS — E66.811 OBESITY, CLASS I, BMI 30-34.9: Primary | ICD-10-CM

## 2024-11-13 PROCEDURE — 99499 UNLISTED E&M SERVICE: CPT | Mod: 95,,,

## 2024-11-13 RX ORDER — SEMAGLUTIDE 2.4 MG/.75ML
2.4 INJECTION, SOLUTION SUBCUTANEOUS
Qty: 3 ML | Refills: 2 | Status: ACTIVE | OUTPATIENT
Start: 2024-11-13

## 2024-11-13 RX ORDER — SEMAGLUTIDE 1.7 MG/.75ML
1.7 INJECTION, SOLUTION SUBCUTANEOUS
Qty: 3 ML | Refills: 0 | Status: ACTIVE | OUTPATIENT
Start: 2024-11-13

## 2024-11-14 ENCOUNTER — LAB VISIT (OUTPATIENT)
Dept: LAB | Facility: OTHER | Age: 49
End: 2024-11-14
Attending: INTERNAL MEDICINE
Payer: COMMERCIAL

## 2024-11-14 DIAGNOSIS — Z00.00 WELLNESS EXAMINATION: ICD-10-CM

## 2024-11-14 LAB
25(OH)D3+25(OH)D2 SERPL-MCNC: 18 NG/ML (ref 30–96)
ALBUMIN SERPL BCP-MCNC: 3.7 G/DL (ref 3.5–5.2)
ALP SERPL-CCNC: 61 U/L (ref 40–150)
ALT SERPL W/O P-5'-P-CCNC: 12 U/L (ref 10–44)
ANION GAP SERPL CALC-SCNC: 7 MMOL/L (ref 8–16)
AST SERPL-CCNC: 14 U/L (ref 10–40)
BASOPHILS # BLD AUTO: 0.05 K/UL (ref 0–0.2)
BASOPHILS NFR BLD: 1.1 % (ref 0–1.9)
BILIRUB SERPL-MCNC: 0.7 MG/DL (ref 0.1–1)
BUN SERPL-MCNC: 16 MG/DL (ref 6–20)
CALCIUM SERPL-MCNC: 8.6 MG/DL (ref 8.7–10.5)
CHLORIDE SERPL-SCNC: 108 MMOL/L (ref 95–110)
CHOLEST SERPL-MCNC: 208 MG/DL (ref 120–199)
CHOLEST/HDLC SERPL: 5.2 {RATIO} (ref 2–5)
CO2 SERPL-SCNC: 23 MMOL/L (ref 23–29)
CREAT SERPL-MCNC: 0.8 MG/DL (ref 0.5–1.4)
CRP SERPL-MCNC: 0.3 MG/L (ref 0–8.2)
DIFFERENTIAL METHOD BLD: ABNORMAL
EOSINOPHIL # BLD AUTO: 0.2 K/UL (ref 0–0.5)
EOSINOPHIL NFR BLD: 4 % (ref 0–8)
ERYTHROCYTE [DISTWIDTH] IN BLOOD BY AUTOMATED COUNT: 12.7 % (ref 11.5–14.5)
ERYTHROCYTE [SEDIMENTATION RATE] IN BLOOD BY PHOTOMETRIC METHOD: 29 MM/HR (ref 0–36)
EST. GFR  (NO RACE VARIABLE): >60 ML/MIN/1.73 M^2
ESTIMATED AVG GLUCOSE: 103 MG/DL (ref 68–131)
GLUCOSE SERPL-MCNC: 90 MG/DL (ref 70–110)
HBA1C MFR BLD: 5.2 % (ref 4–5.6)
HCT VFR BLD AUTO: 37 % (ref 37–48.5)
HDLC SERPL-MCNC: 40 MG/DL (ref 40–75)
HDLC SERPL: 19.2 % (ref 20–50)
HGB BLD-MCNC: 12.5 G/DL (ref 12–16)
IMM GRANULOCYTES # BLD AUTO: 0.01 K/UL (ref 0–0.04)
IMM GRANULOCYTES NFR BLD AUTO: 0.2 % (ref 0–0.5)
LDLC SERPL CALC-MCNC: 155.2 MG/DL (ref 63–159)
LYMPHOCYTES # BLD AUTO: 2.5 K/UL (ref 1–4.8)
LYMPHOCYTES NFR BLD: 53.5 % (ref 18–48)
MCH RBC QN AUTO: 29.9 PG (ref 27–31)
MCHC RBC AUTO-ENTMCNC: 33.8 G/DL (ref 32–36)
MCV RBC AUTO: 89 FL (ref 82–98)
MONOCYTES # BLD AUTO: 0.4 K/UL (ref 0.3–1)
MONOCYTES NFR BLD: 8.3 % (ref 4–15)
NEUTROPHILS # BLD AUTO: 1.6 K/UL (ref 1.8–7.7)
NEUTROPHILS NFR BLD: 32.9 % (ref 38–73)
NONHDLC SERPL-MCNC: 168 MG/DL
NRBC BLD-RTO: 0 /100 WBC
PLATELET # BLD AUTO: 291 K/UL (ref 150–450)
PMV BLD AUTO: 9.3 FL (ref 9.2–12.9)
POTASSIUM SERPL-SCNC: 3.7 MMOL/L (ref 3.5–5.1)
PROT SERPL-MCNC: 7.1 G/DL (ref 6–8.4)
RBC # BLD AUTO: 4.18 M/UL (ref 4–5.4)
SODIUM SERPL-SCNC: 138 MMOL/L (ref 136–145)
TRIGL SERPL-MCNC: 64 MG/DL (ref 30–150)
TSH SERPL DL<=0.005 MIU/L-ACNC: 1.62 UIU/ML (ref 0.4–4)
WBC # BLD AUTO: 4.71 K/UL (ref 3.9–12.7)

## 2024-11-14 PROCEDURE — 85652 RBC SED RATE AUTOMATED: CPT | Performed by: INTERNAL MEDICINE

## 2024-11-14 PROCEDURE — 82306 VITAMIN D 25 HYDROXY: CPT | Performed by: INTERNAL MEDICINE

## 2024-11-14 PROCEDURE — 80053 COMPREHEN METABOLIC PANEL: CPT | Performed by: INTERNAL MEDICINE

## 2024-11-14 PROCEDURE — 80061 LIPID PANEL: CPT | Performed by: INTERNAL MEDICINE

## 2024-11-14 PROCEDURE — 86140 C-REACTIVE PROTEIN: CPT | Performed by: INTERNAL MEDICINE

## 2024-11-14 PROCEDURE — 36415 COLL VENOUS BLD VENIPUNCTURE: CPT | Performed by: INTERNAL MEDICINE

## 2024-11-14 PROCEDURE — 83036 HEMOGLOBIN GLYCOSYLATED A1C: CPT | Performed by: INTERNAL MEDICINE

## 2024-11-14 PROCEDURE — 84443 ASSAY THYROID STIM HORMONE: CPT | Performed by: INTERNAL MEDICINE

## 2024-11-14 PROCEDURE — 85025 COMPLETE CBC W/AUTO DIFF WBC: CPT | Performed by: INTERNAL MEDICINE

## 2024-11-15 ENCOUNTER — OFFICE VISIT (OUTPATIENT)
Dept: OBSTETRICS AND GYNECOLOGY | Facility: CLINIC | Age: 49
End: 2024-11-15
Payer: COMMERCIAL

## 2024-11-15 VITALS — SYSTOLIC BLOOD PRESSURE: 123 MMHG | BODY MASS INDEX: 29.82 KG/M2 | WEIGHT: 163 LBS | DIASTOLIC BLOOD PRESSURE: 78 MMHG

## 2024-11-15 DIAGNOSIS — Z01.411 ENCNTR FOR GYN EXAM (GENERAL) (ROUTINE) W ABNORMAL FINDINGS: Primary | ICD-10-CM

## 2024-11-15 DIAGNOSIS — Z12.31 ENCOUNTER FOR SCREENING MAMMOGRAM FOR BREAST CANCER: ICD-10-CM

## 2024-11-15 DIAGNOSIS — Z30.8 ENCOUNTER FOR OTHER CONTRACEPTIVE MANAGEMENT: ICD-10-CM

## 2024-11-15 DIAGNOSIS — Z11.3 ENCNTR SCREEN FOR INFECTIONS W SEXL MODE OF TRANSMISS: ICD-10-CM

## 2024-11-15 DIAGNOSIS — N92.6 IRREGULAR BLEEDING: ICD-10-CM

## 2024-11-15 DIAGNOSIS — Z30.011 ORAL CONTRACEPTION INITIAL PRESCRIPTION: ICD-10-CM

## 2024-11-15 DIAGNOSIS — Z12.4 ENCOUNTER FOR SCREENING FOR MALIGNANT NEOPLASM OF CERVIX: ICD-10-CM

## 2024-11-15 LAB
B-HCG UR QL: NEGATIVE
CTP QC/QA: YES

## 2024-11-15 PROCEDURE — 87491 CHLMYD TRACH DNA AMP PROBE: CPT | Performed by: NURSE PRACTITIONER

## 2024-11-15 PROCEDURE — 99999 PR PBB SHADOW E&M-EST. PATIENT-LVL III: CPT | Mod: PBBFAC,,, | Performed by: NURSE PRACTITIONER

## 2024-11-15 RX ORDER — LACTIC ACID, L-, CITRIC ACID MONOHYDRATE, AND POTASSIUM BITARTRATE 90; 50; 20 MG/5G; MG/5G; MG/5G
1 GEL VAGINAL ONCE AS NEEDED
Qty: 120 G | Refills: 11 | Status: SHIPPED | OUTPATIENT
Start: 2024-11-15 | End: 2024-11-15

## 2024-11-15 RX ORDER — NORGESTIMATE AND ETHINYL ESTRADIOL 0.25-0.035
KIT ORAL
Qty: 28 TABLET | Refills: 0 | Status: SHIPPED | OUTPATIENT
Start: 2024-11-15

## 2024-11-15 NOTE — PROGRESS NOTES
HISTORY OF PRESENT ILLNESS:    Nora Conklin is a 49 y.o. female, , Patient's last menstrual period was 10/29/2024 (exact date).,  presents for a routine exam with c/o irregular bleeding. Pt reports LMP on 10/29/2024 and bleeding continued (for 17 days) with brown clotty discharge then increases in flow a little with bright red bleeding with mild cramping. Pt had to use a thin pad and pantiliner. Patient reports cycles occur every 4 weeks lasting 3 days using 3 pads per day, blood clots at times up to onofre size. She denies any BTB. Pt denies breast, urinary or gyn issues. Pt reports starting wegovy about 2 months- pt reports losing 6-7 lbs. Pt currently in RN school with moderate stress.     Last Pap: 2023- ASCUS/HR HPV pos, neg for types 16/18  History of abnormal pap: 2023 & 2022- ASCUS/HR HPV pos, neg for types 16/18  Last Mammogram: 2023- Negative (BIRADS 1)   Fam hx of breast or ovarian cancer: No  Last Colonoscopy: 10/21/2024- removed polyps; repeat in 7 years    She uses withdrawal method for birth control.   She does desire STD screening.    The patient participates in regular exercise: yes.    The patient does not smoke.    The patient wears seatbelts.     Pt denies any domestic violence.    Past Medical History:   Diagnosis Date    Medical history non-contributory     Migraine headache      Past Surgical History:   Procedure Laterality Date     SECTION      COLONOSCOPY, SCREENING, LOW RISK PATIENT N/A 10/21/2024    Procedure: COLONOSCOPY, SCREENING, LOW RISK PATIENT;  Surgeon: Yelena Mcclelland MD;  Location: [facility] ENDOSCOPY;  Service: Endoscopy;  Laterality: N/A;  [facility] Sup[facility] to portal.EC  10/14/24- pc complete. DBM     MEDICATIONS AND ALLERGIES:    Current Outpatient Medications:     ASPIRIN/ACETAMINOPHEN/CAFFEINE (GOODY'S MIGRAINE RELIEF ORAL), Take by mouth., Disp: , Rfl:     dextroamphetamine-amphetamine (ADDERALL XR) 15 MG 24 hr  capsule, Take 1 capsule (15 mg total) by mouth every morning., Disp: 30 capsule, Rfl: 0    fluconazole (DIFLUCAN) 150 MG Tab, Take 150 mg by mouth once. (Patient not taking: Reported on 11/15/2024), Disp: , Rfl:     ibuprofen (ADVIL,MOTRIN) 800 MG tablet, Take 1 tablet (800 mg total) by mouth 3 (three) times daily as needed for Pain., Disp: 90 tablet, Rfl: 1    lubiprostone (AMITIZA) 24 MCG Cap, Take 1 capsule (24 mcg total) by mouth 2 (two) times daily., Disp: 180 capsule, Rfl: 3    metroNIDAZOLE (FLAGYL) 500 MG tablet, Take 1 tablet (500 mg total) by mouth every 12 (twelve) hours. (Patient not taking: Reported on 11/15/2024), Disp: 14 tablet, Rfl: 0    polyethylene glycol (GLYCOLAX) 17 gram/dose powder, Mix 1 capful (17 g) in liquid and take by mouth daily as needed., Disp: 510 g, Rfl: 2    rimegepant (NURTEC) 75 mg odt, One tablet by mouth daily as needed for migraine max one tablet in 24 hours Place ODT tablet on the tongue; alternatively the ODT tablet may be placed under the tongue, Disp: 30 tablet, Rfl: 1    semaglutide, weight loss, (WEGOVY) 1.7 mg/0.75 mL PnIj, Inject 1.7 mg into the skin every 7 days., Disp: 3 mL, Rfl: 0    semaglutide, weight loss, (WEGOVY) 2.4 mg/0.75 mL PnIj, Inject 2.4 mg into the skin every 7 days., Disp: 3 mL, Rfl: 2    terconazole (TERAZOL 3) 0.8 % vaginal cream, Place 1 applicator vaginally every evening., Disp: 20 g, Rfl: 1    venlafaxine (EFFEXOR-XR) 37.5 MG 24 hr capsule, Take 1 capsule (37.5 mg total) by mouth once daily., Disp: 30 capsule, Rfl: 11    Current Facility-Administered Medications:     gentamicin injection 120 mg, 120 mg, Intramuscular, 1 time in Clinic/HOD, Tyesha Bauer MD    Review of patient's allergies indicates:  No Known Allergies    Family History   Problem Relation Name Age of Onset    Cancer Maternal Grandmother          lung     Cervical cancer Mother      Emphysema Mother      Hypertension Sister      Valvular heart disease Maternal Aunt       Breast cancer Neg Hx      Colon cancer Neg Hx      Ovarian cancer Neg Hx       Social History     Socioeconomic History    Marital status: Single   Occupational History    Occupation: lpn    Tobacco Use    Smoking status: Never     Passive exposure: Never    Smokeless tobacco: Never   Substance and Sexual Activity    Alcohol use: Yes     Alcohol/week: 0.0 standard drinks of alcohol     Comment: rare    Drug use: No    Sexual activity: Yes     Partners: Male     Birth control/protection: None     Social Drivers of Health     Financial Resource Strain: Low Risk  (8/13/2024)    Overall Financial Resource Strain (CARDIA)     Difficulty of Paying Living Expenses: Not very hard   Food Insecurity: No Food Insecurity (8/13/2024)    Hunger Vital Sign     Worried About Running Out of Food in the Last Year: Never true     Ran Out of Food in the Last Year: Never true   Physical Activity: Insufficiently Active (8/13/2024)    Exercise Vital Sign     Days of Exercise per Week: 2 days     Minutes of Exercise per Session: 30 min   Stress: No Stress Concern Present (8/13/2024)    [employer] [place] [employer] - Occupational Stress Questionnaire     Feeling of Stress : Only a little   Housing Stability: Unknown (8/13/2024)    Housing Stability Vital Sign     Unable to Pay for Housing in the Last Year: No     COMPREHENSIVE GYN HISTORY:  PAP History: Denies abnormal Paps.  Infection History: Denies STDs. Denies PID.  Benign History: Denies uterine fibroids. Denies ovarian cysts. Denies endometriosis. Denies other conditions.  Cancer History: Denies cervical cancer. Denies uterine cancer or hyperplasia. Denies ovarian cancer. Denies vulvar cancer or pre-cancer. Denies vaginal cancer or pre-cancer. Denies breast cancer. Denies colon cancer.  Sexual Activity History: Reports currently being sexually active  Menstrual History: Normal flow (until episode of irregular bleeding)  Dysmenorrhea History: Mild cramping    ROS:  GENERAL:  No weight changes. No swelling. No fatigue. No fever.  CARDIOVASCULAR: No chest pain. No shortness of breath. No leg cramps.   NEUROLOGICAL: No headaches. No vision changes.  BREASTS: No pain. No lumps. No discharge.  ABDOMEN: No pain. No nausea. No vomiting. No diarrhea. No constipation.  REPRODUCTIVE: Reports irregular bleeding  VULVA: No pain. No lesions. No itching.  VAGINA: No relaxation. No itching. No odor. No discharge. No lesions.  URINARY: No incontinence. No nocturia. No frequency. No dysuria.    /78 (BP Location: Left arm, Patient Position: Sitting)   Wt 73.9 kg (163 lb 0.5 oz)   LMP 10/29/2024 (Exact Date)   BMI 29.82 kg/m²     PE:  APPEARANCE: Well nourished, well developed, in no acute distress.  AFFECT: WNL, alert and oriented x 3.  SKIN: No acne or hirsutism.  NECK: Neck symmetric, without masses or thyromegaly.  NODES: No inguinal, cervical, axillary or femoral lymph node enlargement.  CHEST: Good respiratory effort.   ABDOMEN: Soft. No tenderness or masses. No hepatosplenomegaly. No hernias.  BREASTS: Symmetrical, no skin changes, visible lesions, palpable masses or nipple discharge bilaterally.  PELVIC: External female genitalia without lesions.  Female hair distribution. Adequate perineal body, Normal urethral meatus. Vagina moist and well rugated without lesions or discharge.  No significant cystocele or rectocele present. Cervix pink without lesions, discharge or tenderness. Uterus is normal, mobile and nontender. Adnexa without masses or tenderness. PAP SMEAR PERFORMED TODAY  EXTREMITIES: No edema    DIAGNOSIS:  1. Encntr for gyn exam (general) (routine) w abnormal findings    2. Encounter for screening mammogram for breast cancer    3. Encounter for screening for malignant neoplasm of cervix    4. Encntr screen for infections w sexl mode of transmiss      PLAN:  -Pap smear performed today, A.C.S. pap guidelines discussed (every 3 yrs with hx of normal paps).   -CBE performed today.  Sent order for mammogram. Recommend CBE yearly and encouraged breast self-exam/awareness. Pt verbalized understanding.  -BC: sent phexxi to pharmacy- discussed benefits, risks, side effects, proper use; sent OCP cascade to pharmacy to regulated irregular bleeding  -GC/CT culture performed today  -Irregular bleeding: will send order for pelvic U/S; discussed possible cause due to weight loss med & losing 7 lbs within the last 2 months  -UPT negative at OV today     COUNSELING:  A.C.S. pap guidelines discussed (every 3 yrs with hx of normal paps). Recommend yearly mammograms.     FOLLOW-UP with me annually and follow up with PCP for other health maintenance.    PROBLEM VISIT INFO:  Dx: Irregular bleeding; Dx code: N92.6; Rx: OCP cascade; sent order for GC/CT culture & pelvic U/S; CPT code: 22566-38 modifier    Pratibha Sandhu DNP, RN, APRN, WHNP-BC  [facility]  Ob/Gyn Department- [facility]

## 2024-11-25 ENCOUNTER — HOSPITAL ENCOUNTER (OUTPATIENT)
Dept: RADIOLOGY | Facility: HOSPITAL | Age: 49
Discharge: HOME | End: 2024-11-25
Attending: NURSE PRACTITIONER
Payer: COMMERCIAL

## 2024-11-25 DIAGNOSIS — N92.6 IRREGULAR BLEEDING: ICD-10-CM

## 2024-11-25 PROCEDURE — 76830 TRANSVAGINAL US NON-OB: CPT | Mod: 26,,, | Performed by: RADIOLOGY

## 2024-11-25 PROCEDURE — 76830 TRANSVAGINAL US NON-OB: CPT | Mod: TC,PN

## 2024-11-25 PROCEDURE — 76856 US EXAM PELVIC COMPLETE: CPT | Mod: 26,,, | Performed by: RADIOLOGY

## 2024-12-05 DIAGNOSIS — F98.8 ATTENTION DEFICIT DISORDER, UNSPECIFIED TYPE: ICD-10-CM

## 2024-12-05 RX ORDER — DEXTROAMPHETAMINE SACCHARATE, AMPHETAMINE ASPARTATE MONOHYDRATE, DEXTROAMPHETAMINE SULFATE AND AMPHETAMINE SULFATE 3.75; 3.75; 3.75; 3.75 MG/1; MG/1; MG/1; MG/1
15 CAPSULE, EXTENDED RELEASE ORAL EVERY MORNING
Qty: 30 CAPSULE | Refills: 0 | Status: SHIPPED | OUTPATIENT
Start: 2024-12-05

## 2024-12-05 NOTE — TELEPHONE ENCOUNTER
No care due was identified.  [facility] Embedded Care Due Messages. Reference number: [id number].   12/05/2024 6:22:48 AM CST

## 2025-01-07 ENCOUNTER — PATIENT MESSAGE (OUTPATIENT)
Dept: ADMINISTRATIVE | Facility: OTHER | Age: 50
End: 2025-01-07
Payer: COMMERCIAL

## 2025-01-08 ENCOUNTER — TELEPHONE (OUTPATIENT)
Dept: INTERNAL MEDICINE | Facility: CLINIC | Age: 50
End: 2025-01-08
Payer: COMMERCIAL

## 2025-01-08 DIAGNOSIS — Z11.1 SCREENING-PULMONARY TB: Primary | ICD-10-CM

## 2025-01-09 ENCOUNTER — LAB VISIT (OUTPATIENT)
Dept: LAB | Facility: OTHER | Age: 50
End: 2025-01-09
Attending: INTERNAL MEDICINE
Payer: COMMERCIAL

## 2025-01-09 DIAGNOSIS — Z11.1 SCREENING-PULMONARY TB: ICD-10-CM

## 2025-01-09 PROCEDURE — 86480 TB TEST CELL IMMUN MEASURE: CPT | Performed by: INTERNAL MEDICINE

## 2025-01-10 LAB
GAMMA INTERFERON BACKGROUND BLD IA-ACNC: 0.06 IU/ML
M TB IFN-G CD4+ BCKGRND COR BLD-ACNC: -0 IU/ML
M TB IFN-G CD4+ BCKGRND COR BLD-ACNC: -0 IU/ML
MITOGEN IGNF BCKGRD COR BLD-ACNC: 6.07 IU/ML
TB GOLD PLUS: NEGATIVE

## 2025-01-24 ENCOUNTER — PROCEDURE VISIT (OUTPATIENT)
Dept: OBSTETRICS AND GYNECOLOGY | Facility: CLINIC | Age: 50
End: 2025-01-24
Payer: COMMERCIAL

## 2025-01-24 VITALS
DIASTOLIC BLOOD PRESSURE: 73 MMHG | WEIGHT: 165.38 LBS | SYSTOLIC BLOOD PRESSURE: 130 MMHG | BODY MASS INDEX: 30.24 KG/M2

## 2025-01-24 DIAGNOSIS — R87.612 LGSIL ON PAP SMEAR OF CERVIX: Primary | ICD-10-CM

## 2025-01-24 DIAGNOSIS — B97.7 HPV (HUMAN PAPILLOMA VIRUS) INFECTION: ICD-10-CM

## 2025-01-24 LAB
B-HCG UR QL: NEGATIVE
CTP QC/QA: YES

## 2025-01-24 PROCEDURE — 88305 TISSUE EXAM BY PATHOLOGIST: CPT | Mod: 26,,, | Performed by: PATHOLOGY

## 2025-01-24 PROCEDURE — 88305 TISSUE EXAM BY PATHOLOGIST: CPT | Mod: 59 | Performed by: PATHOLOGY

## 2025-01-24 PROCEDURE — 88342 IMHCHEM/IMCYTCHM 1ST ANTB: CPT | Performed by: PATHOLOGY

## 2025-01-24 PROCEDURE — 57454 BX/CURETT OF CERVIX W/SCOPE: CPT | Mod: S$GLB,,, | Performed by: OBSTETRICS & GYNECOLOGY

## 2025-01-24 PROCEDURE — 88342 IMHCHEM/IMCYTCHM 1ST ANTB: CPT | Mod: 26,,, | Performed by: PATHOLOGY

## 2025-01-24 PROCEDURE — 81025 URINE PREGNANCY TEST: CPT | Mod: S$GLB,,, | Performed by: OBSTETRICS & GYNECOLOGY

## 2025-01-24 PROCEDURE — 99499 UNLISTED E&M SERVICE: CPT | Mod: S$GLB,,, | Performed by: OBSTETRICS & GYNECOLOGY

## 2025-01-29 ENCOUNTER — PATIENT MESSAGE (OUTPATIENT)
Dept: OBSTETRICS AND GYNECOLOGY | Facility: CLINIC | Age: 50
End: 2025-01-29
Payer: COMMERCIAL

## 2025-01-29 LAB
FINAL PATHOLOGIC DIAGNOSIS: NORMAL
GROSS: NORMAL
Lab: NORMAL

## 2025-01-29 NOTE — PROCEDURES
Colposcopy    Date/Time: 1/24/2025 1:45 PM    Performed by: Mallory Sullivan MD  Authorized by: Mallory Sullivan MD    Consent obatined:  Prior to procedure the appropriate consent was completed and verified  Timeout:Immediately prior to procedure a time out was called to verify the correct patient, procedure, equipment, support staff and site/side marked as required  Prep:Patient was prepped and draped in the usual sterile fashion  Assistants?: Yes    List of assistants:  Mary MARTINEZ was present for the entire procedure.    Colposcopy Site:  Cervix  Position:  Supine   Patient was prepped and draped in the normal sterile fashion.  Acrowhite Lesion? Yes    Atypical Vessels: No    Transformation Zone Adequate?: Yes    Biopsy?: Yes         Location:  Cervix ((2 00, 12 00 and 7 00))  ECC Performed?: Yes    LEEP Performed?: No    Estimated blood loss (cc):  1   Patient tolerated the procedure well with no immediate complications.   Post-operative instructions were provided for the patient.   Patient was discharged and will follow up if any complications occur     Monsels applied to cervix, hemostasis noted.   Small band of tissue on cervix, incised with alligator forceps. Hemostasis noted.  Discussed off label gardasil vaccine (>age46) and AHCC 3 g daily x 6 months.

## 2025-02-07 DIAGNOSIS — N87.1 CIN II (CERVICAL INTRAEPITHELIAL NEOPLASIA II): Primary | ICD-10-CM

## 2025-02-13 DIAGNOSIS — F98.8 ATTENTION DEFICIT DISORDER, UNSPECIFIED TYPE: ICD-10-CM

## 2025-02-13 NOTE — TELEPHONE ENCOUNTER
No care due was identified.  [facility] Embedded Care Due Messages. Reference number: [id number].   2/13/2025 3:36:17 AM CST

## 2025-02-21 RX ORDER — DEXTROAMPHETAMINE SACCHARATE, AMPHETAMINE ASPARTATE MONOHYDRATE, DEXTROAMPHETAMINE SULFATE AND AMPHETAMINE SULFATE 3.75; 3.75; 3.75; 3.75 MG/1; MG/1; MG/1; MG/1
15 CAPSULE, EXTENDED RELEASE ORAL EVERY MORNING
Qty: 30 CAPSULE | Refills: 0 | Status: SHIPPED | OUTPATIENT
Start: 2025-02-21

## 2025-03-17 ENCOUNTER — OFFICE VISIT (OUTPATIENT)
Dept: OBSTETRICS AND GYNECOLOGY | Facility: CLINIC | Age: 50
End: 2025-03-17
Payer: COMMERCIAL

## 2025-03-17 DIAGNOSIS — N87.1 CIN II (CERVICAL INTRAEPITHELIAL NEOPLASIA II): Primary | ICD-10-CM

## 2025-03-17 PROCEDURE — 99999 PR PBB SHADOW E&M-EST. PATIENT-LVL II: CPT | Mod: PBBFAC,,, | Performed by: OBSTETRICS & GYNECOLOGY

## 2025-03-17 PROCEDURE — 99499 UNLISTED E&M SERVICE: CPT | Mod: S$GLB,,, | Performed by: OBSTETRICS & GYNECOLOGY

## 2025-03-17 NOTE — PROGRESS NOTES
[facility] - Ob Gyn  Obstetrics & Gynecology  History & Physical    Patient Name: Nora Conklin  MRN: [id number]  Admission Date: (Not on file)  Primary Care Provider: Tyesha Bauer MD    Subjective:     Chief Complaint/Reason for Admission: scheduled surgery    History of Present Illness: 49 y/o  presents for scheduled LEEP for ROGER II. No complaints today.    Current Outpatient Medications on File Prior to Visit   Medication Sig    ASPIRIN/ACETAMINOPHEN/CAFFEINE (GOODY'S MIGRAINE RELIEF ORAL) Take by mouth.    dextroamphetamine-amphetamine (ADDERALL XR) 15 MG 24 hr capsule Take 1 capsule (15 mg total) by mouth every morning.    fluconazole (DIFLUCAN) 150 MG Tab Take 150 mg by mouth once.    ibuprofen (ADVIL,MOTRIN) 800 MG tablet Take 1 tablet (800 mg total) by mouth 3 (three) times daily as needed for Pain.    lubiprostone (AMITIZA) 24 MCG Cap Take 1 capsule (24 mcg total) by mouth 2 (two) times daily.    metroNIDAZOLE (FLAGYL) 500 MG tablet Take 1 tablet (500 mg total) by mouth every 12 (twelve) hours.    norgestimate-ethinyl estradioL (ORTHO-CYCLEN) 0.25-35 mg-mcg per tablet Take 3 tablets by mouth today, 2 tablets tomorrow and then 1 tablet as directed    polyethylene glycol (GLYCOLAX) 17 gram/dose powder Mix 1 capful (17 g) in liquid and take by mouth daily as needed.    rimegepant (NURTEC) 75 mg odt take One tablet by mouth daily as needed for migraine max one tablet in 24 hours Place ODT tablet on the tongue; alternatively the ODT tablet may be placed under the tongue    semaglutide, weight loss, (WEGOVY) 2.4 mg/0.75 mL PnIj Inject 2.4 mg into the skin every 7 days.    terconazole (TERAZOL 3) 0.8 % vaginal cream Place 1 applicator vaginally every evening.    venlafaxine (EFFEXOR-XR) 37.5 MG 24 hr capsule Take 1 capsule (37.5 mg total) by mouth once daily.     Current Facility-Administered Medications on File Prior to Visit   Medication    gentamicin injection 120 mg       Review of patient's  allergies indicates:  No Known Allergies    Past Medical History:   Diagnosis Date    Medical history non-contributory     Migraine headache      OB History    Para Term  AB Living   3 3 3   3   SAB IAB Ectopic Multiple Live Births             # Outcome Date GA Lbr Jamar/2nd Weight Sex Type Anes PTL Lv   3 Term            2 Term            1 Term              Past Surgical History:   Procedure Laterality Date     SECTION      COLONOSCOPY, SCREENING, LOW RISK PATIENT N/A 10/21/2024    Procedure: COLONOSCOPY, SCREENING, LOW RISK PATIENT;  Surgeon: Yelena Mcclelland MD;  Location[facility] ENDOSCOPY;  Service: Endoscopy;  Laterality: N/A;  [facility] Suprep instr. to portal.EC  10/14/24- pc complete. DBM     Family History       Problem Relation (Age of Onset)    Cancer Maternal Grandmother    Cervical cancer Mother    Emphysema Mother    Hypertension Sister    Valvular heart disease Maternal Aunt          Tobacco Use    Smoking status: Never     Passive exposure: Never    Smokeless tobacco: Never   Substance and Sexual Activity    Alcohol use: Yes     Alcohol/week: 0.0 standard drinks of alcohol     Comment: rare    Drug use: No    Sexual activity: Yes     Partners: Male     Birth control/protection: None     Review of Systems   Constitutional:  Negative for chills, diaphoresis, fatigue and fever.   Respiratory:  Negative for cough and shortness of breath.    Cardiovascular:  Negative for chest pain and palpitations.   Gastrointestinal:  Negative for abdominal pain, constipation, diarrhea, nausea and vomiting.   Genitourinary:  Negative for dyspareunia, menorrhagia, menstrual problem, pelvic pain, vaginal bleeding, vaginal discharge and vaginal pain.   Neurological:  Negative for headaches.   Psychiatric/Behavioral:  Negative for depression. The patient is not nervous/anxious.      Objective:     Vital Signs (Most Recent):    Vital Signs (24h Range):  [unfilled]        There is no  height or weight on file to calculate BMI.  No LMP recorded.    Physical Exam:   Constitutional: She is oriented to person, place, and time. She appears well-developed and well-nourished. No distress.    HENT:   Head: Normocephalic and atraumatic.    Eyes: EOM are normal.     Cardiovascular:  Regular rhythm and normal heart sounds.      Exam reveals no gallop, no clubbing and no cyanosis.       No murmur heard.   Pulmonary/Chest: Effort normal. She has no wheezes. She has no rales.                  Musculoskeletal: Normal range of motion and moves all extremeties.       Neurological: She is alert and oriented to person, place, and time.    Skin: No cyanosis. Nails show no clubbing.    Psychiatric: She has a normal mood and affect. Her behavior is normal. Judgment and thought content normal.       Laboratory:  None    Diagnostic Results:  Final Pathologic Diagnosis 1. Cervical biopsy at 12 o'clock shows acute and chronic inflammation, no dysplasia identified.    2. Cervical biopsy at 2 o'clock shows an area of moderate squamous dysplasia (ROGER 2) present in a background of acute and chronic inflammation.  Immunostain for p16 shows full-thickness epithelial staining in this area.  The control stains appropriately.  3. Cervical biopsy at 7 o'clock shows focal mild squamous dysplasia (CIN1) present in a background of acute and chronic inflammation.    4. Endocervical curettage shows small fragments of benign endocervical and endometrial tissue, no dysplasia identified       Assessment/Plan:     There are no hospital problems to display for this patient.  To OR for LEEP on 3/24/25  Orders placed  Consents signed. Risks, benefits, alternatives discussed.  Preadmit scheduled.   She is no longer taking GLP-1 at this time.    Mallory Sullivan MD  Obstetrics & Gynecology  [facility] - Ob Gyn

## 2025-03-19 ENCOUNTER — ANESTHESIA EVENT (OUTPATIENT)
Dept: SURGERY | Facility: OTHER | Age: 50
End: 2025-03-19
Payer: COMMERCIAL

## 2025-03-19 NOTE — ANESTHESIA PREPROCEDURE EVALUATION
10/13/2024  Nora Conklin is a 49 y.o., female.  [facility]  Anesthesia Pre-Operative Evaluation       Patient Name: Nora Conklin  YOB: 1975  MRN: [id number]  CSN: [id number]      Code Status: No Order   Date of Procedure: 10/21/2024  Anesthesia: Choice Procedure: Procedure(s) (LRB):  COLONOSCOPY (N/A)  Pre-Operative Diagnosis: Wellness examination [Z00.00]  Proceduralist: Surgeons and Role:     * Yelena Mcclelland MD - Primary Nurse: (Unknown)      SUBJECTIVE:   Nora Conklin is a 49 y.o. female who  has a past medical history of Medical history non-contributory and Migraine headache..     she has a current medication list which includes the following long-term medication(s): dextroamphetamine-amphetamine and venlafaxine.     ALLERGIES:   Review of patient's allergies indicates:  No Known Allergies  LDA:          Lines/Drains/Airways       None                  Anesthesia Evaluation      Airway   Mallampati: II  TM distance: Normal  Dental      Pulmonary    Cardiovascular     Rate: Normal    Neuro/Psych    (+) headaches    GI/Hepatic/Renal      Endo/Other    Abdominal                   MEDICATIONS:     Antibiotics (From admission, onward)      None          VTE Risk Mitigation (From admission, onward)      None              Current Facility-Administered Medications   Medication Dose Route Frequency Provider Last Rate Last Admin    gentamicin injection 120 mg  120 mg Intramuscular 1 time in Clinic/HOD Tyesha Bauer MD         Current Outpatient Medications   Medication Sig Dispense Refill    ASPIRIN/ACETAMINOPHEN/CAFFEINE (GOODY'S MIGRAINE RELIEF ORAL) Take by mouth.      dextroamphetamine-amphetamine (ADDERALL XR) 15 MG 24 hr capsule Take 1 capsule (15 mg total) by mouth every morning. 30 capsule 0    fluconazole (DIFLUCAN) 150 MG Tab Take 150 mg by  mouth once. (Patient not taking: Reported on 2024)      ibuprofen (ADVIL,MOTRIN) 800 MG tablet Take 1 tablet (800 mg total) by mouth 3 (three) times daily as needed for Pain. 90 tablet 1    lubiprostone (AMITIZA) 24 MCG Cap Take 1 capsule (24 mcg total) by mouth 2 (two) times daily. 180 capsule 3    metroNIDAZOLE (FLAGYL) 500 MG tablet Take 1 tablet (500 mg total) by mouth every 12 (twelve) hours. 14 tablet 0    polyethylene glycol (GLYCOLAX) 17 gram/dose powder Mix 1 capful (17 g) in liquid and take by mouth daily as needed. 510 g 2    rimegepant (NURTEC) 75 mg odt One tablet by mouth daily as needed for migraine max one tablet in 24 hours Place ODT tablet on the tongue; alternatively the ODT tablet may be placed under the tongue 30 tablet 1    semaglutide, weight loss, (WEGOVY) 1 mg/0.5 mL PnIj Inject 1 mg into the skin every 7 days. 2 mL 0    terconazole (TERAZOL 3) 0.8 % vaginal cream Place 1 applicator vaginally every evening. 20 g 1    venlafaxine (EFFEXOR-XR) 37.5 MG 24 hr capsule Take 1 capsule (37.5 mg total) by mouth once daily. (Patient not taking: Reported on 2024) 30 capsule 11          History:   There are no hospital problems to display for this patient.    Surgical History:    has a past surgical history that includes  section.   Social History:    reports being sexually active and has had partner(s) who are male. She reports using the following method of birth control/protection: None.  reports that she has never smoked. She has never been exposed to tobacco smoke. She has never used smokeless tobacco. She reports current alcohol use. She reports that she does not use drugs.     OBJECTIVE:     Vital Signs (Most Recent):    Vital Signs Range (Last 24H):          There is no height or weight on file to calculate BMI.   Wt Readings from Last 4 Encounters:   24 77 kg (169 lb 12.1 oz)   24 77.5 kg (170 lb 13.7 oz)   24 76.5 kg (168 lb 10.4 oz)   23 79.3  "kg (174 lb 13.2 oz)       Significant Labs:  Lab Results   Component Value Date    WBC 7.28 09/29/2023    HGB 12.0 09/29/2023    HCT 36.9 (L) 09/29/2023     09/29/2023     (L) 09/29/2023    K 3.8 09/29/2023     09/29/2023    CREATININE 0.62 09/29/2023    BUN 16 09/29/2023    CO2 23 09/29/2023     09/29/2023    CALCIUM 8.9 09/29/2023    ALKPHOS 59 09/29/2023    ALT 20 09/29/2023    AST 29 09/29/2023    ALBUMIN 4.1 09/29/2023    HGBA1C 5.3 08/15/2023    TROPONINI <0.012 09/29/2023     No LMP recorded.  No results found for this or any previous visit (from the past 72 hours).    EKG:   Results for orders placed or performed during the hospital encounter of 09/29/23   EKG 12-lead    Collection Time: 09/29/23  7:34 PM    Narrative    Test Reason : R07.9,    Vent. Rate : 084 BPM     Atrial Rate : 084 BPM     P-R Int : 180 ms          QRS Dur : 076 ms      QT Int : 360 ms       P-R-T Axes : 076 067 064 degrees     QTc Int : 425 ms    Normal sinus rhythm  Septal infarct (cited on or before 30-JUN-2022)  Abnormal ECG  When compared with ECG of 30-JUN-2022 15:34,  No significant change was found  Confirmed by Abiodun Hall MD ([id number]) on 10/3/2023 5:18:47 PM    Referred By: [email or web address]   SELF           Confirmed By:Abiodun Hall MD       TTE:  No results found for this or any previous visit.  No results found for: "EF"   No results found for this or any previous visit.  DEBBIE:  No results found for this or any previous visit.  Stress Test:  No results found for this or any previous visit.     LHC:  No results found for this or any previous visit.     PFT:  No results found for: "FEV1", "FVC", "XDU8VTF", "TLC", "DLCO"     ASSESSMENT/PLAN:        Pre-op Assessment    I have reviewed the Patient Summary Reports.     I have reviewed the Nursing Notes. I have reviewed the NPO Status.   I have reviewed the Medications.     Review of Systems  Anesthesia Hx:  No problems with previous Anesthesia             " Denies Family Hx of Anesthesia complications.    Denies Personal Hx of Anesthesia complications.                    Hematology/Oncology:  Hematology Normal   Oncology Normal                                   EENT/Dental:  EENT/Dental Normal           Cardiovascular:  Cardiovascular Normal                                              Pulmonary:  Pulmonary Normal                       Renal/:  Renal/ Normal                 Hepatic/GI:  Hepatic/GI Normal                    Musculoskeletal:  Musculoskeletal Normal                Neurological:      Headaches                                 Endocrine:  Endocrine Normal          Obesity / BMI > 30  Dermatological:  Skin Normal    Psych:  Psychiatric Normal                  Physical Exam  General: Well nourished    Airway:  Mallampati: II   Mouth Opening: Normal  TM Distance: Normal    Chest/Lungs:  Normal Respiratory Rate    Heart:  Rate: Normal    Anesthesia Plan  Type of Anesthesia, risks & benefits discussed:    Anesthesia Type: Gen Natural Airway  Intra-op Monitoring Plan: Standard ASA Monitors  Post Op Pain Control Plan: multimodal analgesia  Induction:  IV  Informed Consent: Informed consent signed with the Patient and all parties understand the risks and agree with anesthesia plan.  All questions answered.   ASA Score: 2  Day of Surgery Review of History & Physical: H&P Update referred to the surgeon/provider.    Ready For Surgery From Anesthesia Perspective.     .                                                                                                               03/19/2025  Nora Conklin is a 50 y.o., female.      Pre-op Assessment    I have reviewed the Patient Summary Reports.     I have reviewed the Nursing Notes. I have reviewed the NPO Status.   I have reviewed the Medications.     Review of Systems  Anesthesia Hx:  No problems with previous Anesthesia                Social:  Non-Smoker       Cardiovascular:  Exercise tolerance: good                                              Neurological:      Headaches                                     Physical Exam  General: Well nourished    Airway:  Mallampati: II   Mouth Opening: Normal  TM Distance: Normal  Neck ROM: Normal ROM      Anesthesia Plan  Type of Anesthesia, risks & benefits discussed:    Anesthesia Type: Gen Supraglottic Airway, Gen ETT  Intra-op Monitoring Plan: Standard ASA Monitors  Induction:  IV  Informed Consent: Informed consent signed with the Patient and all parties understand the risks and agree with anesthesia plan.  All questions answered.   ASA Score: 2    Ready For Surgery From Anesthesia Perspective.     .

## 2025-03-21 ENCOUNTER — TELEPHONE (OUTPATIENT)
Dept: INTERNAL MEDICINE | Facility: CLINIC | Age: 50
End: 2025-03-21
Payer: COMMERCIAL

## 2025-03-21 ENCOUNTER — PATIENT MESSAGE (OUTPATIENT)
Dept: PREADMISSION TESTING | Facility: OTHER | Age: 50
End: 2025-03-21
Payer: COMMERCIAL

## 2025-03-21 NOTE — H&P (VIEW-ONLY)
Information to Prepare you for your Surgery    PRE-ADMIT TESTING & SURGERY  [address] [facility] BUILDING  ENTRANCE 2       Your surgery has been scheduled at [facility]. We are pleased to have the opportunity to serve you. For Further Information please call [phone].    On the day of surgery please report to the Registration Desk on the 1st floor of the [facility]. This is a 4 story red brick building on the corner of [facility] [address] [facility]. Turn onto [address]  to access the parking lot behind the [facility] at the corner of [facility].    CONTACT YOUR PHYSICIAN'S OFFICE THE DAY PRIOR TO YOUR SURGERY TO OBTAIN YOUR ARRIVAL TIME.     The evening before surgery do not eat anything after 9 p.m. ( this includes hard candy, chewing gum and mints).  You may only have GATORADE, POWERADE AND WATER  from 9 p.m. until you leave your home.   DO NOT DRINK ANY LIQUIDS ON THE WAY TO THE HOSPITAL.      Why does your anesthesiologist allow you to drink Gatorade/Powerade before surgery?  Gatorade/Powerade helps to increase your comfort before surgery and to decrease your nausea after surgery.   The carbohydrates in Gatorade/Powerade help reduce your body's stress response to surgery.    If you are a diabetic-drink only water prior to surgery.    Outpatient Surgery- May allow 2 adults (18 and older)/ Support Persons (1 being the designated ) for all surgical/procedural patients. A breastfeeding mother will be allowed her infant and 2 adult Support Persons. No one under the age of 18 will be allowed in the building.    MEDICATION INSTRUCTIONS:     Surgery Patients:  If you take ASPIRIN - Your PHYSICIAN/SURGEON will need to inform you IF/OR when you need to stop taking aspirin prior to your surgery.     Starting the week prior to surgery, do not take any medications containing IBUPROFEN or NSAIDS (Advil, Aleve, BC, Goody's, Ketorolac, Meloxicam, Mobic, Motrin, Naproxen,  Toradol, etc).  If you are not sure if you should take a medicine, please call your surgeon's office.  You may take Tylenol.    Do Not Wear any make-up (especially eye make-up) to surgery. Please remove any false eyelashes or eyelash extensions. If you arrive the day of surgery with makeup/eyelashes on you will be required to remove prior to surgery. (There is a risk of corneal abrasions if eye makeup/eyelash extensions are not removed)    Leave all valuables at home.   Do Not wear any jewelry or watches, including any metal in body piercings. Jewelry must be removed prior to coming to the hospital.  There is a possibility that rings that are unable to be removed may be cut off if they are on the surgical extremity.    Please remove all hair extensions, wigs, clips and any other metal accessories/ ornaments from your hair.  These items may pose a flammable/fire risk in Surgery and must be removed.    Do not shave your surgical area at least 5 days prior to your surgery. The surgical prep will be performed at the hospital according to Infection Control regulations.    Contact Lens must be removed before surgery. Either do not wear the contact lens or bring a case and solution for storage.  Please bring a container for eyeglasses or dentures as required.  Bring any paperwork your physician has provided, such as consent forms,  history and physicals, doctor's orders, etc.   Bring comfortable clothes that are loose fitting to wear upon discharge. Take into consideration the type of surgery being performed.  Maintain your diet as advised per your physician the day prior to surgery.    Adequate rest the night before surgery is advised.   Park in the Parking lot behind the hospital or in the [place] [facility] across the street from the parking lot. Parking is complimentary.  If you will be discharged the same day as your procedure, please arrange for a responsible adult to drive you home or to accompany you if  traveling by taxi.   YOU WILL NOT BE PERMITTED TO DRIVE OR TO LEAVE THE HOSPITAL ALONE AFTER SURGERY.   If you are being discharged the same day, it is strongly recommended that you arrange for someone to remain with you for the first 24 hrs following your surgery.    The Surgeon will speak to your family/visitor after your surgery regarding the outcome of your surgery and post op care.  The Surgeon may speak to you after your surgery, but there is a possibility you may not remember the details.  Please check with your family members regarding the conversation with the Surgeon.    We strongly recommend whoever is bringing you home be present for discharge instructions.  This will ensure a thorough understanding for your post op home care.    If the patient has fever, cough, or signs/symptoms of Flu or Covid please do not come in for your surgery.   Contact your surgeon and your primary care physician for further instructions.               Bathing Instructions  Shower (no bath) the evening before and the morning of your procedure with antibacterial soap.  Wash your face with water and your regular face wash/soap  Use your regular shampoo  Dry off as usual Do not use any deodorant, powder, body lotions, perfume, after shave or cologne.     Or     Bathing Instructions with Hibiclens    Shower the evening before and morning of your procedure with Chlorhexidine (Hibiclens)  do not use Chlorhexidine on your face or genitals. Do not get in your eyes.  Wash your face with water and your regular face wash/soap  Use your regular shampoo  Apply Chlorhexidine (Hibiclens) directly on your skin or on a wet washcloth and wash gently. When showering: Move away from the shower stream when applying Chlorhexidine (Hibiclens) to avoid rinsing off too soon.  Rinse thoroughly with warm water  Do not dilute Chlorhexidine (Hibiclens)   Dry off as usual, do not use any deodorant, powder, body lotions, perfume, after shave or cologne.

## 2025-03-21 NOTE — PRE ADMISSION SCREENING
Information to Prepare you for your Surgery    PRE-ADMIT TESTING & SURGERY  [address] [facility] BUILDING  ENTRANCE 2       Your surgery has been scheduled at [facility]. We are pleased to have the opportunity to serve you. For Further Information please call [phone].    On the day of surgery please report to the Registration Desk on the 1st floor of the [facility]. This is a 4 story red brick building on the corner of [facility]. Turn onto [address]  to access the parking lot behind the [facility] at the corner of [facility].    CONTACT YOUR PHYSICIAN'S OFFICE THE DAY PRIOR TO YOUR SURGERY TO OBTAIN YOUR ARRIVAL TIME.     The evening before surgery do not eat anything after 9 p.m. ( this includes hard candy, chewing gum and mints).  You may only have GATORADE, POWERADE AND WATER  from 9 p.m. until you leave your home.   DO NOT DRINK ANY LIQUIDS ON THE WAY TO THE HOSPITAL.      Why does your anesthesiologist allow you to drink Gatorade/Powerade before surgery?  Gatorade/Powerade helps to increase your comfort before surgery and to decrease your nausea after surgery.   The carbohydrates in Gatorade/Powerade help reduce your body's stress response to surgery.    If you are a diabetic-drink only water prior to surgery.    Outpatient Surgery- May allow 2 adults (18 and older)/ Support Persons (1 being the designated ) for all surgical/procedural patients. A breastfeeding mother will be allowed her infant and 2 adult Support Persons. No one under the age of 18 will be allowed in the building.    MEDICATION INSTRUCTIONS:     Surgery Patients:  If you take ASPIRIN - Your PHYSICIAN/SURGEON will need to inform you IF/OR when you need to stop taking aspirin prior to your surgery.     Starting the week prior to surgery, do not take any medications containing IBUPROFEN or NSAIDS (Advil, Aleve, BC, Goody's, Ketorolac, Meloxicam, Mobic, Motrin, Naproxen,  Toradol, etc).  If you are not sure if you should take a medicine, please call your surgeon's office.  You may take Tylenol.    Do Not Wear any make-up (especially eye make-up) to surgery. Please remove any false eyelashes or eyelash extensions. If you arrive the day of surgery with makeup/eyelashes on you will be required to remove prior to surgery. (There is a risk of corneal abrasions if eye makeup/eyelash extensions are not removed)    Leave all valuables at home.   Do Not wear any jewelry or watches, including any metal in body piercings. Jewelry must be removed prior to coming to the hospital.  There is a possibility that rings that are unable to be removed may be cut off if they are on the surgical extremity.    Please remove all hair extensions, wigs, clips and any other metal accessories/ ornaments from your hair.  These items may pose a flammable/fire risk in Surgery and must be removed.    Do not shave your surgical area at least 5 days prior to your surgery. The surgical prep will be performed at the hospital according to Infection Control regulations.    Contact Lens must be removed before surgery. Either do not wear the contact lens or bring a case and solution for storage.  Please bring a container for eyeglasses or dentures as required.  Bring any paperwork your physician has provided, such as consent forms,  history and physicals, doctor's orders, etc.   Bring comfortable clothes that are loose fitting to wear upon discharge. Take into consideration the type of surgery being performed.  Maintain your diet as advised per your physician the day prior to surgery.    Adequate rest the night before surgery is advised.   Park in the Parking lot behind the hospital or in the [place] [facility] across the street from the parking lot. Parking is complimentary.  If you will be discharged the same day as your procedure, please arrange for a responsible adult to drive you home or to accompany you if  traveling by taxi.   YOU WILL NOT BE PERMITTED TO DRIVE OR TO LEAVE THE HOSPITAL ALONE AFTER SURGERY.   If you are being discharged the same day, it is strongly recommended that you arrange for someone to remain with you for the first 24 hrs following your surgery.    The Surgeon will speak to your family/visitor after your surgery regarding the outcome of your surgery and post op care.  The Surgeon may speak to you after your surgery, but there is a possibility you may not remember the details.  Please check with your family members regarding the conversation with the Surgeon.    We strongly recommend whoever is bringing you home be present for discharge instructions.  This will ensure a thorough understanding for your post op home care.    If the patient has fever, cough, or signs/symptoms of Flu or Covid please do not come in for your surgery.   Contact your surgeon and your primary care physician for further instructions.               Bathing Instructions  Shower (no bath) the evening before and the morning of your procedure with antibacterial soap.  Wash your face with water and your regular face wash/soap  Use your regular shampoo  Dry off as usual Do not use any deodorant, powder, body lotions, perfume, after shave or cologne.     Or     Bathing Instructions with Hibiclens    Shower the evening before and morning of your procedure with Chlorhexidine (Hibiclens)  do not use Chlorhexidine on your face or genitals. Do not get in your eyes.  Wash your face with water and your regular face wash/soap  Use your regular shampoo  Apply Chlorhexidine (Hibiclens) directly on your skin or on a wet washcloth and wash gently. When showering: Move away from the shower stream when applying Chlorhexidine (Hibiclens) to avoid rinsing off too soon.  Rinse thoroughly with warm water  Do not dilute Chlorhexidine (Hibiclens)   Dry off as usual, do not use any deodorant, powder, body lotions, perfume, after shave or cologne.

## 2025-03-21 NOTE — TELEPHONE ENCOUNTER
Patient has been closed out of [facility] program due to cost of therapy/unable to reach. Programs updated, medications discontinued, and future visits canceled. Pt to welcome to re-enroll in [facility] program at later date if they would like.

## 2025-03-21 NOTE — TELEPHONE ENCOUNTER
----- Message from Pharmacist Falguni sent at 3/21/2025  9:24 AM CDT -----  Regarding: Wegovy  Good morning,In January patient had concerns about being able to afford medication. After multiple attempts to follow up, she was paused due to Unable to contact. She has not returned our calls so at this time I will close her out and discontinue remaining prescriptions.Falguni Boyd, Clinical Pharmacist

## 2025-03-24 ENCOUNTER — ANESTHESIA (OUTPATIENT)
Dept: SURGERY | Facility: OTHER | Age: 50
End: 2025-03-24
Payer: COMMERCIAL

## 2025-03-24 ENCOUNTER — HOSPITAL ENCOUNTER (OUTPATIENT)
Facility: OTHER | Age: 50
Discharge: HOME | End: 2025-03-24
Attending: OBSTETRICS & GYNECOLOGY | Admitting: OBSTETRICS & GYNECOLOGY
Payer: COMMERCIAL

## 2025-03-24 VITALS
WEIGHT: 165.38 LBS | HEART RATE: 86 BPM | RESPIRATION RATE: 16 BRPM | SYSTOLIC BLOOD PRESSURE: 116 MMHG | HEIGHT: 62 IN | BODY MASS INDEX: 30.44 KG/M2 | TEMPERATURE: 99 F | OXYGEN SATURATION: 100 % | DIASTOLIC BLOOD PRESSURE: 67 MMHG

## 2025-03-24 DIAGNOSIS — N87.1 CIN II (CERVICAL INTRAEPITHELIAL NEOPLASIA II): ICD-10-CM

## 2025-03-24 DIAGNOSIS — Z98.890 S/P LEEP: Primary | ICD-10-CM

## 2025-03-24 LAB
B-HCG UR QL: NEGATIVE
CTP QC/QA: YES

## 2025-03-24 PROCEDURE — 71000033 HC RECOVERY, INTIAL HOUR: Performed by: OBSTETRICS & GYNECOLOGY

## 2025-03-24 PROCEDURE — 63600175 PHARM REV CODE 636 W HCPCS: Performed by: NURSE ANESTHETIST, CERTIFIED REGISTERED

## 2025-03-24 PROCEDURE — 36000706: Performed by: OBSTETRICS & GYNECOLOGY

## 2025-03-24 PROCEDURE — 88305 TISSUE EXAM BY PATHOLOGIST: CPT | Mod: 26,,, | Performed by: PATHOLOGY

## 2025-03-24 PROCEDURE — 37000008 HC ANESTHESIA 1ST 15 MINUTES: Performed by: OBSTETRICS & GYNECOLOGY

## 2025-03-24 PROCEDURE — D9220A PRA ANESTHESIA: Mod: CRNA,,, | Performed by: NURSE ANESTHETIST, CERTIFIED REGISTERED

## 2025-03-24 PROCEDURE — 36000707: Performed by: OBSTETRICS & GYNECOLOGY

## 2025-03-24 PROCEDURE — 57522 CONIZATION OF CERVIX: CPT | Mod: ,,, | Performed by: OBSTETRICS & GYNECOLOGY

## 2025-03-24 PROCEDURE — 88342 IMHCHEM/IMCYTCHM 1ST ANTB: CPT | Mod: TC | Performed by: OBSTETRICS & GYNECOLOGY

## 2025-03-24 PROCEDURE — 25000003 PHARM REV CODE 250: Performed by: NURSE ANESTHETIST, CERTIFIED REGISTERED

## 2025-03-24 PROCEDURE — 81025 URINE PREGNANCY TEST: CPT | Performed by: OBSTETRICS & GYNECOLOGY

## 2025-03-24 PROCEDURE — 88307 TISSUE EXAM BY PATHOLOGIST: CPT | Mod: 26,,, | Performed by: PATHOLOGY

## 2025-03-24 PROCEDURE — D9220A PRA ANESTHESIA: Mod: ANES,,, | Performed by: ANESTHESIOLOGY

## 2025-03-24 PROCEDURE — 71000015 HC POSTOP RECOV 1ST HR: Performed by: OBSTETRICS & GYNECOLOGY

## 2025-03-24 PROCEDURE — 25000003 PHARM REV CODE 250: Performed by: OBSTETRICS & GYNECOLOGY

## 2025-03-24 PROCEDURE — 71000039 HC RECOVERY, EACH ADD'L HOUR: Performed by: OBSTETRICS & GYNECOLOGY

## 2025-03-24 PROCEDURE — 88342 IMHCHEM/IMCYTCHM 1ST ANTB: CPT | Mod: 26,,, | Performed by: PATHOLOGY

## 2025-03-24 PROCEDURE — 37000009 HC ANESTHESIA EA ADD 15 MINS: Performed by: OBSTETRICS & GYNECOLOGY

## 2025-03-24 RX ORDER — ONDANSETRON 8 MG/1
8 TABLET, ORALLY DISINTEGRATING ORAL EVERY 8 HOURS PRN
Status: DISCONTINUED | OUTPATIENT
Start: 2025-03-24 | End: 2025-03-24 | Stop reason: HOSPADM

## 2025-03-24 RX ORDER — PROPOFOL 10 MG/ML
VIAL (ML) INTRAVENOUS
Status: DISCONTINUED | OUTPATIENT
Start: 2025-03-24 | End: 2025-03-24

## 2025-03-24 RX ORDER — LIDOCAINE HYDROCHLORIDE 20 MG/ML
INJECTION INTRAVENOUS
Status: DISCONTINUED | OUTPATIENT
Start: 2025-03-24 | End: 2025-03-24

## 2025-03-24 RX ORDER — GLUCAGON 1 MG
1 KIT INJECTION
Status: DISCONTINUED | OUTPATIENT
Start: 2025-03-24 | End: 2025-03-24 | Stop reason: HOSPADM

## 2025-03-24 RX ORDER — SODIUM CHLORIDE 0.9 % (FLUSH) 0.9 %
2 SYRINGE (ML) INJECTION ONCE
Status: DISCONTINUED | OUTPATIENT
Start: 2025-03-24 | End: 2025-03-24 | Stop reason: HOSPADM

## 2025-03-24 RX ORDER — PHENYLEPHRINE HYDROCHLORIDE 10 MG/ML
INJECTION INTRAVENOUS
Status: DISCONTINUED | OUTPATIENT
Start: 2025-03-24 | End: 2025-03-24

## 2025-03-24 RX ORDER — ACETAMINOPHEN 325 MG/1
650 TABLET ORAL EVERY 4 HOURS PRN
Qty: 30 TABLET | Refills: 1 | Status: SHIPPED | OUTPATIENT
Start: 2025-03-24 | End: 2026-03-24

## 2025-03-24 RX ORDER — SODIUM CHLORIDE, SODIUM LACTATE, POTASSIUM CHLORIDE, CALCIUM CHLORIDE 600; 310; 30; 20 MG/100ML; MG/100ML; MG/100ML; MG/100ML
INJECTION, SOLUTION INTRAVENOUS CONTINUOUS
Status: DISCONTINUED | OUTPATIENT
Start: 2025-03-24 | End: 2025-03-24 | Stop reason: HOSPADM

## 2025-03-24 RX ORDER — DIPHENHYDRAMINE HYDROCHLORIDE 50 MG/ML
25 INJECTION, SOLUTION INTRAMUSCULAR; INTRAVENOUS EVERY 4 HOURS PRN
Status: DISCONTINUED | OUTPATIENT
Start: 2025-03-24 | End: 2025-03-24 | Stop reason: HOSPADM

## 2025-03-24 RX ORDER — HYDROCODONE BITARTRATE AND ACETAMINOPHEN 5; 325 MG/1; MG/1
1 TABLET ORAL EVERY 4 HOURS PRN
Refills: 0 | Status: DISCONTINUED | OUTPATIENT
Start: 2025-03-24 | End: 2025-03-24 | Stop reason: HOSPADM

## 2025-03-24 RX ORDER — DIPHENHYDRAMINE HCL 25 MG
25 CAPSULE ORAL EVERY 4 HOURS PRN
Status: DISCONTINUED | OUTPATIENT
Start: 2025-03-24 | End: 2025-03-24 | Stop reason: HOSPADM

## 2025-03-24 RX ORDER — ONDANSETRON HYDROCHLORIDE 2 MG/ML
4 INJECTION, SOLUTION INTRAVENOUS DAILY PRN
Status: DISCONTINUED | OUTPATIENT
Start: 2025-03-24 | End: 2025-03-24 | Stop reason: HOSPADM

## 2025-03-24 RX ORDER — LIDOCAINE HYDROCHLORIDE 10 MG/ML
0.5 INJECTION, SOLUTION EPIDURAL; INFILTRATION; INTRACAUDAL; PERINEURAL ONCE
Status: DISCONTINUED | OUTPATIENT
Start: 2025-03-24 | End: 2025-03-24 | Stop reason: HOSPADM

## 2025-03-24 RX ORDER — IBUPROFEN 600 MG/1
600 TABLET ORAL EVERY 6 HOURS PRN
Qty: 30 TABLET | Refills: 1 | Status: SHIPPED | OUTPATIENT
Start: 2025-03-24

## 2025-03-24 RX ORDER — OXYCODONE HYDROCHLORIDE 5 MG/1
5 TABLET ORAL
Status: DISCONTINUED | OUTPATIENT
Start: 2025-03-24 | End: 2025-03-24 | Stop reason: HOSPADM

## 2025-03-24 RX ORDER — FENTANYL CITRATE 50 UG/ML
INJECTION, SOLUTION INTRAMUSCULAR; INTRAVENOUS
Status: DISCONTINUED | OUTPATIENT
Start: 2025-03-24 | End: 2025-03-24

## 2025-03-24 RX ORDER — HYDROMORPHONE HYDROCHLORIDE 2 MG/ML
0.4 INJECTION, SOLUTION INTRAMUSCULAR; INTRAVENOUS; SUBCUTANEOUS EVERY 5 MIN PRN
Status: DISCONTINUED | OUTPATIENT
Start: 2025-03-24 | End: 2025-03-24 | Stop reason: HOSPADM

## 2025-03-24 RX ORDER — PROCHLORPERAZINE EDISYLATE 5 MG/ML
5 INJECTION INTRAMUSCULAR; INTRAVENOUS EVERY 30 MIN PRN
Status: DISCONTINUED | OUTPATIENT
Start: 2025-03-24 | End: 2025-03-24 | Stop reason: HOSPADM

## 2025-03-24 RX ORDER — ONDANSETRON HYDROCHLORIDE 2 MG/ML
INJECTION, SOLUTION INTRAVENOUS
Status: DISCONTINUED | OUTPATIENT
Start: 2025-03-24 | End: 2025-03-24

## 2025-03-24 RX ADMIN — FENTANYL CITRATE 100 MCG: 50 INJECTION, SOLUTION INTRAMUSCULAR; INTRAVENOUS at 10:03

## 2025-03-24 RX ADMIN — ONDANSETRON HYDROCHLORIDE 4 MG: 2 INJECTION INTRAMUSCULAR; INTRAVENOUS at 11:03

## 2025-03-24 RX ADMIN — SODIUM CHLORIDE: 0.9 INJECTION, SOLUTION INTRAVENOUS at 10:03

## 2025-03-24 RX ADMIN — PROPOFOL 180 MG: 10 INJECTION, EMULSION INTRAVENOUS at 10:03

## 2025-03-24 RX ADMIN — LIDOCAINE HYDROCHLORIDE 50 MG: 20 INJECTION, SOLUTION INTRAVENOUS at 10:03

## 2025-03-24 RX ADMIN — PHENYLEPHRINE HYDROCHLORIDE 100 MCG: 10 INJECTION INTRAVENOUS at 10:03

## 2025-03-24 NOTE — OP NOTE
Operative Report    Procedure: LEEP    Date of Surgery 03/24/2025    Attending: Mallory Sullivan MD    Surgeon: Mallory Sullivan MD    Pre-Op Diagnosis: CIN2    Post-op Diagnosis:  ROGER 2, s/p LEEP    EBL: 5 cc     IVF: See anesthesia report    Urine Output: 15 cc urine drained prior to start of case     Specimen:   1. Ectocervix specimen obtained with large white loop, 1 pass, marked with stitch at 12 o'clock  2. Endocervical curettage    Findings:   1. Normal external female genitalia  2. Anterior lip of cervix elevated likely from prior C/S, small tissue band noted at R aspect, released w/ bovie cautery   3. Lugol solution applied, decreased uptake visualized at 2 o'clock and 5-7 o'clock.   4. Entire transformation zone excised    Procedure in Detail:  After proper consents were explained and obtained, the patient was taken to the Operating Room where anesthesia was obtained without difficulty. She was then placed in dorsal lithotomy position in Cheo stirrups. The patient was then prepped and draped in normal sterile fashion. A coated speculum was placed into the vagina, however the cervix was not able to be appropriately visualized. The cervix appeared tacked up anteriorly 2/2 scarring, likely from prior C/S. A band was noted at the right aspect of the cervix which was released using electrocautery. Coated sidewall retractors were used to visualize the cervix and transformation zone. Lugol solution was applied to the cervix. Decreased uptake visualized at 2 o'clock and from 5-7 o'clock. The white loop was selected. The entire transformation zone was excised in 1 pass. The specimen was tagged with a suture at 12:00. An endocervical curettage was performed. The base was then cauterized with roller ball cautery.  The specimens were placed in formalin and sent to Pathology for a Histopathologic diagnosis.    The patient tolerated the procedure well. All counts were correct x2. The patient was taken to Recovery area  in stable condition.    Mallory Sullivan MD  Obstetrics & Gynecology, PGY-1    Attestation:  I was present and scrubbed for the entire procedure. I updated the patient's family contact via phone immediately after the procedure.     Mallory Sullivan MD  OB/GYN

## 2025-03-24 NOTE — ANESTHESIA POSTPROCEDURE EVALUATION
Anesthesia Post Evaluation    Patient: Nora Conklin    Procedure(s) Performed: Procedure(s) (LRB):  LEEP CONIZATION, CERVIX (N/A)    Final Anesthesia Type: general      Patient location during evaluation: PACU  Patient participation: Yes- Able to Participate  Level of consciousness: awake and alert  Post-procedure vital signs: reviewed and stable  Pain management: adequate  Airway patency: patent    PONV status at discharge: No PONV  Anesthetic complications: no      Cardiovascular status: blood pressure returned to baseline  Respiratory status: unassisted  Hydration status: euvolemic  Follow-up not needed.              Vitals Value Taken Time   /64 03/24/25 12:36   Temp 36.9 °C (98.5 °F) 03/24/25 12:36   Pulse 87 03/24/25 12:36   Resp 16 03/24/25 12:36   SpO2 100 % 03/24/25 12:36         Event Time   Out of Recovery 12:33:26         Pain/Jony Score: Jony Score: 10 (3/24/2025 12:36 PM)

## 2025-03-24 NOTE — TRANSFER OF CARE
"Anesthesia Transfer of Care Note    Patient: Nora Conklin    Procedure(s) Performed: Procedure(s) (LRB):  LEEP CONIZATION, CERVIX (N/A)    Patient location: PACU    Anesthesia Type: general    Transport from OR: Transported from OR on 2-3 L/min O2 by NC with adequate spontaneous ventilation    Post pain: adequate analgesia    Post assessment: no apparent anesthetic complications    Post vital signs: stable    Level of consciousness: awake    Nausea/Vomiting: no nausea/vomiting    Complications: none    Transfer of care protocol was followed      Last vitals: Visit Vitals  /65 (BP Location: Right arm, Patient Position: Lying)   Pulse 89   Temp 37.2 °C (98.9 °F) (Temporal)   Resp 16   Ht 5' 2" (1.575 m)   Wt 75 kg (165 lb 5.5 oz)   LMP 02/26/2025 (Exact Date)   SpO2 100%   Breastfeeding No   BMI 30.24 kg/m²     "

## 2025-03-24 NOTE — DISCHARGE SUMMARY
"Discharge Summary  Gynecology      Admit Date: 3/24/2025    Discharge Date and Time: 3/24/2025     Attending Physician: Mallory Sullivan MD    Principal Diagnoses: S/P LEEP    Active Hospital Problems    Diagnosis  POA    *S/P LEEP for ROGER II [Z98.890]  Not Applicable      Resolved Hospital Problems   No resolved problems to display.       Procedures: Procedure(s) (LRB):  LEEP CONIZATION, CERVIX (N/A)    Discharged Condition: stable    Hospital Course:   Nora Conklin is a 50 y.o. y.o.  female who presented on 3/24/2025  for above procedures for the treatment of ROGER 2. Patient tolerated procedure. PMH noncontributory. Post-operative course was uncomplicated.  On day of discharge, patient was urinating, ambulating, and tolerating a regular diet without difficulty. Pain was well controlled on PO medication. She was discharged home on POD#0 in stable condition with instructions to follow up with Dr. Sullivan in 4 weeks.     Consults: None    Significant Diagnostic Studies:  No results for input(s): "WBC", "HGB", "HCT", "MCV", "PLT" in the last 168 hours.     Treatments:   1. Surgery as above    Disposition: Home or Self Care    Patient Instructions:   Current Discharge Medication List        START taking these medications    Details   acetaminophen (TYLENOL) 325 MG tablet Take 2 tablets (650 mg total) by mouth every 4 (four) hours as needed for Pain.  Qty: 30 tablet, Refills: 1           CONTINUE these medications which have CHANGED    Details   ibuprofen (ADVIL,MOTRIN) 600 MG tablet Take 1 tablet (600 mg total) by mouth every 6 (six) hours as needed.  Qty: 30 tablet, Refills: 1           CONTINUE these medications which have NOT CHANGED    Details   dextroamphetamine-amphetamine (ADDERALL XR) 15 MG 24 hr capsule Take 1 capsule (15 mg total) by mouth every morning.  Qty: 30 capsule, Refills: 0    Associated Diagnoses: Attention deficit disorder, unspecified type      lubiprostone (AMITIZA) 24 MCG " Cap Take 1 capsule (24 mcg total) by mouth 2 (two) times daily.  Qty: 180 capsule, Refills: 3      polyethylene glycol (GLYCOLAX) 17 gram/dose powder Mix 1 capful (17 g) in liquid and take by mouth daily as needed.  Qty: 510 g, Refills: 2      rimegepant (NURTEC) 75 mg odt take One tablet by mouth daily as needed for migraine max one tablet in 24 hours Place ODT tablet on the tongue; alternatively the ODT tablet may be placed under the tongue  Qty: 30 tablet, Refills: 1      venlafaxine (EFFEXOR-XR) 37.5 MG 24 hr capsule Take 1 capsule (37.5 mg total) by mouth once daily.  Qty: 30 capsule, Refills: 11             Discharge Procedure Orders   Diet general     Pelvic Rest     Call MD for:  temperature >100.4     Call MD for:  persistent nausea and vomiting     Call MD for:  severe uncontrolled pain     Call MD for:  difficulty breathing, headache or visual disturbances     Call MD for:  redness, tenderness, or signs of infection (pain, swelling, redness, odor or green/yellow discharge around incision site)     Call MD for:  hives     Call MD for:  persistent dizziness or light-headedness     Call MD for:  extreme fatigue     Call MD for:   Order Comments: Heavy vaginal bleeding, saturating >1 pad per hour for >1 hour     No dressing needed     Activity as tolerated     Shower on day dressing removed (No bath)        Follow-up Information       Mallory Sullivan MD Follow up in 4 week(s).    Specialty: Obstetrics and Gynecology  Why: Post-op  Contact information:  [address] [facility] [address] [phone]                             Mary Hernandez MD  Obstetrics & Gynecology, PGY-1     Mallory Sullivan MD  OB/GYN

## 2025-03-24 NOTE — DISCHARGE INSTRUCTIONS
Anesthesia: After Your Surgery  Youve just had surgery. During surgery, you received medication called anesthesia to keep you comfortable and pain-free. After surgery, you may experience some pain or nausea. This is common. Here are some tips for feeling better and recovering after surgery.    Going home  Your doctor or nurse will show you how to take care of yourself when you go home. He or she will also answer your questions. Have an adult family member or friend drive you home. For the first 24 hours after your surgery:  Do not drive or use heavy equipment.  Do not make important decisions or sign legal documents.  Avoid alcohol.  Have someone stay with you, if needed. He or she can watch for problems and help keep you safe.  Take your time getting up from a seated or lying position. You may experience dizziness for 24 hours  Be sure to keep all follow-up appointments with your doctor. And rest after your procedure for as long as your doctor tells you to.    Coping with pain  If you have pain after surgery, pain medication will help you feel better. Take it as directed, before pain becomes severe. Also, ask your doctor or pharmacist about other ways to control pain, such as with heat, ice, and relaxation. And follow any other instructions your surgeon or nurse gives you.    URINARY RETENTION  Should you experience a decrease in your urine output or are unable to urinate following surgery, this can be due to the medications given during surgery.  We recommend you going to the nearest Emergency Department.    Tips for taking pain medication  To get the best relief possible, remember these points:  Pain medications can upset your stomach. Taking them with a little food may help.  Most pain relievers taken by mouth need at least 20 to 30 minutes to take effect.  Taking medication on a schedule can help you remember to take it. Try to time your medication so that you can take it before beginning an activity, such  as dressing, walking, or sitting down for dinner.  Constipation is a common side effect of pain medications. Contact your doctor before taking any medications like laxatives or stool softeners to help relieve constipation. Also ask about any dietary restrictions, because drinking lots of fluids and eating foods like fruits and vegetables that are high in fiber can also help. Remember, dont take laxatives unless your surgeon has prescribed them.  Mixing alcohol and pain medication can cause dizziness and slow your breathing. It can even be fatal. Dont drink alcohol while taking pain medication.  Pain medication can slow your reflexes. Dont drive or operate machinery while taking pain medication.  If your health care provider tells you to take acetaminophen to help relieve your pain, ask him or her how much you are supposed to take each day. (Acetaminophen is the generic name for Tylenol and other brand-name pain relievers.) Acetaminophen or other pain relievers may interact with your prescription medicines or other over-the-counter (OTC) drugs. Some prescription medications contain acetaminophen along with other active ingredients. Using both prescription and OTC acetaminophen for pain can cause you to overdose. The FDA recommends that you read the labels on your OTC medications carefully. This will help you to clearly understand the list of active ingredients, dosing instructions, and any warnings. It may also help you avoid taking too much acetaminophen. If you have questions or don't understand the information, ask your pharmacist or health care provider to explain it to you before you take the OTC medication.    Managing nausea  Some people have an upset stomach after surgery. This is often due to anesthesia, pain, pain medications, or the stress of surgery. The following tips will help you manage nausea and get good nutrition as you recover. If you were on a special diet before surgery, ask your doctor if you  should follow it during recovery. These tips may help:  Dont push yourself to eat. Your body will tell you when to eat and how much.  Start off with clear liquids and soup. They are easier to digest.  Progress to semi-solid foods (mashed potatoes, applesauce, and gelatin) as you feel ready.  Slowly move to solid foods. Dont eat fatty, rich, or spicy foods at first.  Dont force yourself to have three large meals a day. Instead, eat smaller amounts more often.  Take pain medications with a small amount of solid food, such as crackers or toast to avoid nausea.      Call your surgeon if    You feel too sleepy, dizzy, or groggy (medication may be too strong).  You have side effects like nausea, vomiting, or skin changes (rash, itching, or hives).   © 1102-9048 [employer]. [address] [place] [address]. All rights reserved. This information is not intended as a substitute for professional medical care. Always follow your healthcare professional's instructions.      Home Care Instructions  LEEP(Loop Electrosurgical Excisional Procedure)               ACTIVITY LEVEL:  If you received sedation and/or an anesthetic, you may feel sleepy for several hours. Rest until you feel more awake. Gradually resume your normal activities. You may return to regular exercise in one week. Avoid strenuous exercise (running or high-impact aerobics) until the watery discharge stops. (Approximately 3 weeks.)    DIET:  At home, continue with liquids. If there is no nausea, you may eat a soft diet and gradually resume a regular diet.    BATHING:  You may shower in one day.    CARE:  You can expect a bloody, dark brownish vaginal discharge for 2-3 days. This discharge may be granular- it may have some clumps that look similar to pieces of tissue. This is normal after a LEEP due to the medication that is applied to minimize bleeding. Vaginal bleeding may occur for the first 4-5 days and should not be heavier than your  normal period. You may also expect a watery vaginal discharge for 2-3 weeks following the  procedure. Your regular menstrual period may be delayed or heavier than normal.  Nothing should be inserted into the vagina until the cervix is healed. Avoid douches and do not use tampons for 2-3 weeks. Sexual activity is restricted as advised by your doctor.    MEDICATIONS:  You will receive instructions for any pain and/or antibiotic prescriptions. Pain medication should be taken only if needed and as directed. Antibiotics, if ordered, should be taken as directed until the entire prescription is completed.    ADDITIONAL INFORMATION:  You may have some cramps for the first 2-3 days. You can use Tylenol, Motrin, or any other over-the-counter medicine that you normally take for this pain. AVOID taking aspirin for 7 days.    WHEN TO CALL THE DOCTOR:   For any heavy vaginal bleeding   Fever over 101°F (38.4°C)   Any lower abdominal pain not relieved by the pain mediation   Foul smelling discharge that is becoming worse rather than better

## 2025-03-24 NOTE — INTERVAL H&P NOTE
Nora Conklin is 50 y.o.  presenting for scheduled LEEP.    Temp:  [98.9 °F (37.2 °C)] 98.9 °F (37.2 °C)  Pulse:  [89] 89  Resp:  [16] 16  SpO2:  [100 %] 100 %  BP: (124)/(65) 124/65    General: NAD, alert, oriented, cooperative  HEENT: NCAT, EOM grossly intact  Lungs: Normal WOB  Heart: regular rate  Abdomen: soft, nondistended, nontender, no rebound or guarding    Consents in chart. Pre-operative heparin not indicated. All questions answered and concerns addressed. To OR for planned procedure.    Mary Hernandez MD  Obstetrics & Gynecology, PGY-1

## 2025-03-24 NOTE — PLAN OF CARE
Yoonhermelinda Conklin has met all discharge criteria from Phase II. Vital Signs are stable, ambulating  without difficulty. Discharge instructions given, patient verbalized understanding. Discharged from facility via wheelchair in stable condition.

## 2025-03-26 ENCOUNTER — TELEPHONE (OUTPATIENT)
Dept: OBSTETRICS AND GYNECOLOGY | Facility: CLINIC | Age: 50
End: 2025-03-26
Payer: COMMERCIAL

## 2025-03-26 NOTE — TELEPHONE ENCOUNTER
Spoke with patient, doing well after surgery. Pathology pending. Will schedule follow up appts accordingly.     Mallory Sullivan MD  OB/GYN

## 2025-03-27 ENCOUNTER — RESULTS FOLLOW-UP (OUTPATIENT)
Dept: OBSTETRICS AND GYNECOLOGY | Facility: CLINIC | Age: 50
End: 2025-03-27

## 2025-03-27 LAB
ESTROGEN SERPL-MCNC: NORMAL PG/ML
INSULIN SERPL-ACNC: NORMAL U[IU]/ML
LAB AP CLINICAL INFORMATION: NORMAL
LAB AP GROSS DESCRIPTION: NORMAL
LAB AP PERFORMING LOCATION(S): NORMAL
LAB AP REPORT FOOTNOTES: NORMAL
T3RU NFR SERPL: NORMAL %

## 2025-04-04 NOTE — TELEPHONE ENCOUNTER
Refill Routing Note   Medication(s) are not appropriate for processing by [facility] for the following reason(s):        Outside of protocol    [facility] action(s):  Route             Appointments  past 12m or future 3m with PCP    Date Provider   Last Visit   5/23/2024 Tyesha Bauer MD   Next Visit   Visit date not found Tyesha Bauer MD   ED visits in past 90 days: 0        Note composed:8:58 AM 04/04/2025

## 2025-04-07 RX ORDER — RIMEGEPANT SULFATE 75 MG/75MG
TABLET, ORALLY DISINTEGRATING ORAL
Qty: 30 TABLET | Refills: 1 | Status: SHIPPED | OUTPATIENT
Start: 2025-04-07

## 2025-04-08 ENCOUNTER — OFFICE VISIT (OUTPATIENT)
Dept: OPTOMETRY | Facility: CLINIC | Age: 50
End: 2025-04-08
Payer: COMMERCIAL

## 2025-04-08 DIAGNOSIS — H52.203 MYOPIA OF BOTH EYES WITH ASTIGMATISM AND PRESBYOPIA: Primary | ICD-10-CM

## 2025-04-08 DIAGNOSIS — H52.13 MYOPIA OF BOTH EYES WITH ASTIGMATISM AND PRESBYOPIA: Primary | ICD-10-CM

## 2025-04-08 DIAGNOSIS — H52.4 MYOPIA OF BOTH EYES WITH ASTIGMATISM AND PRESBYOPIA: Primary | ICD-10-CM

## 2025-04-08 PROCEDURE — 92014 COMPRE OPH EXAM EST PT 1/>: CPT | Mod: S$GLB,,, | Performed by: OPTOMETRIST

## 2025-04-08 PROCEDURE — 92015 DETERMINE REFRACTIVE STATE: CPT | Mod: S$GLB,,, | Performed by: OPTOMETRIST

## 2025-04-08 PROCEDURE — 99999 PR PBB SHADOW E&M-EST. PATIENT-LVL III: CPT | Mod: PBBFAC,,, | Performed by: OPTOMETRIST

## 2025-04-08 NOTE — PROGRESS NOTES
HPI    Nora Conklin is a 50 y.o. female who feels distance VA blurry even   with glasses     (+)blurred vision  (+)Headaches  (--)diplopia  (--)flashes  (--)floaters  (--)pain  (--)Itching  (--)tearing  (--)burning  (--)Dryness  (--) OTC Drops  (--)Photophobia      Last edited by Seth Gray, OD on 4/8/2025  2:39 PM.            Assessment /Plan     For exam results, see Encounter Report.    Myopia of both eyes with astigmatism and presbyopia  Eyeglass Final Rx       Eyeglass Final Rx         Sphere Cylinder Axis Dist VA    Right -0.50 +0.50 175 20/20    Left -0.75 Sphere  20/20      Type: SVL    Expiration Date: 4/8/2026                  [employer]      RTC 1 yr

## 2025-04-23 DIAGNOSIS — F98.8 ATTENTION DEFICIT DISORDER, UNSPECIFIED TYPE: ICD-10-CM

## 2025-04-23 RX ORDER — DEXTROAMPHETAMINE SACCHARATE, AMPHETAMINE ASPARTATE MONOHYDRATE, DEXTROAMPHETAMINE SULFATE AND AMPHETAMINE SULFATE 3.75; 3.75; 3.75; 3.75 MG/1; MG/1; MG/1; MG/1
15 CAPSULE, EXTENDED RELEASE ORAL EVERY MORNING
Qty: 30 CAPSULE | Refills: 0 | Status: SHIPPED | OUTPATIENT
Start: 2025-04-23

## 2025-04-23 NOTE — TELEPHONE ENCOUNTER
No care due was identified.  [facility] Embedded Care Due Messages. Reference number: [id number].   4/23/2025 1:58:29 PM CDT

## 2025-05-02 ENCOUNTER — OFFICE VISIT (OUTPATIENT)
Dept: OBSTETRICS AND GYNECOLOGY | Facility: CLINIC | Age: 50
End: 2025-05-02
Payer: COMMERCIAL

## 2025-05-02 VITALS
DIASTOLIC BLOOD PRESSURE: 79 MMHG | BODY MASS INDEX: 30.24 KG/M2 | SYSTOLIC BLOOD PRESSURE: 128 MMHG | WEIGHT: 165.38 LBS

## 2025-05-02 DIAGNOSIS — Z98.890 S/P LEEP: Primary | ICD-10-CM

## 2025-05-02 DIAGNOSIS — N87.1 CIN II (CERVICAL INTRAEPITHELIAL NEOPLASIA II): ICD-10-CM

## 2025-05-02 PROCEDURE — 3078F DIAST BP <80 MM HG: CPT | Mod: CPTII,S$GLB,, | Performed by: OBSTETRICS & GYNECOLOGY

## 2025-05-02 PROCEDURE — 1160F RVW MEDS BY RX/DR IN RCRD: CPT | Mod: CPTII,S$GLB,, | Performed by: OBSTETRICS & GYNECOLOGY

## 2025-05-02 PROCEDURE — 1159F MED LIST DOCD IN RCRD: CPT | Mod: CPTII,S$GLB,, | Performed by: OBSTETRICS & GYNECOLOGY

## 2025-05-02 PROCEDURE — 99999 PR PBB SHADOW E&M-EST. PATIENT-LVL III: CPT | Mod: PBBFAC,,, | Performed by: OBSTETRICS & GYNECOLOGY

## 2025-05-02 PROCEDURE — 3074F SYST BP LT 130 MM HG: CPT | Mod: CPTII,S$GLB,, | Performed by: OBSTETRICS & GYNECOLOGY

## 2025-05-02 PROCEDURE — 99024 POSTOP FOLLOW-UP VISIT: CPT | Mod: CPTII,S$GLB,, | Performed by: OBSTETRICS & GYNECOLOGY

## 2025-05-02 NOTE — PROGRESS NOTES
Subjective     Patient ID: Nora Conklin is a 50 y.o. female.    Chief Complaint:  Postpartum Care      History of Present Illness  HPI  Postoperative Follow-up  Patient presents to the clinic 6 weeks status post LEEP for ROGER II. Final path ROGER I with clear margins. Reports had light bleeding for about a month, had a menstrual cycle during that time most likely. Reports bleeding has stopped.     PATHOLOGY  Final Diagnosis   1. Cervical LEEP cone biopsy shows areas of mild squamous dysplasia (CIN1) present in a background of chronic inflammation.  No high-grade dysplasia identified.  Mild dysplasia does not involve the inked edges of this LEEP cone biopsy.  2. Endocervical curettage shows fragments of benign endocervical tissue with reactive stroma, no dysplasia identified.  Immunostain for p16 shows patchy positivity in endocervical glandular epithelium.  No full-thickness epithelial staining is identified.  The control stains appropriately.        GYN & OB History  No LMP recorded.   Date of Last Pap: 2024    OB History    Para Term  AB Living   3 3 3   3   SAB IAB Ectopic Multiple Live Births             # Outcome Date GA Lbr Jamar/2nd Weight Sex Type Anes PTL Lv   3 Term            2 Term            1 Term                Review of Systems  Review of Systems   Constitutional:  Negative for chills, diaphoresis, fatigue and fever.   Respiratory:  Negative for cough and shortness of breath.    Cardiovascular:  Negative for chest pain and palpitations.   Gastrointestinal:  Negative for abdominal pain, constipation, diarrhea, nausea and vomiting.   Genitourinary:  Negative for dyspareunia, pelvic pain, vaginal bleeding, vaginal discharge and vaginal pain.   Neurological:  Negative for headaches.   Psychiatric/Behavioral:  Negative for depression.           Objective   Physical Exam:   Constitutional: She appears well-developed and well-nourished. No distress.    HENT:   Head: Normocephalic  and atraumatic.       Pulmonary/Chest: Effort normal.                  Musculoskeletal: Normal range of motion and moves all extremeties.       Neurological: She is alert.     Psychiatric: She has a normal mood and affect. Her behavior is normal. Judgment and thought content normal.            Assessment and Plan     1. S/P LEEP for ROGER II    2. ROGER II (cervical intraepithelial neoplasia II)          Plan:  Nora was seen today for postpartum care.    Diagnoses and all orders for this visit:    S/P LEEP for ROGER II    ROGER II (cervical intraepithelial neoplasia II)    Reviewed pathology  Given ROGER II on colposcopy prior to LEEP, recommended pap/HPV in 6 months.    No orders of the defined types were placed in this encounter.      Follow up in about 6 months (around 11/2/2025) for pap/HPV.              Private Vehicle

## 2025-05-09 ENCOUNTER — OFFICE VISIT (OUTPATIENT)
Dept: INTERNAL MEDICINE | Facility: CLINIC | Age: 50
End: 2025-05-09
Payer: COMMERCIAL

## 2025-05-09 VITALS
HEIGHT: 62 IN | HEART RATE: 94 BPM | OXYGEN SATURATION: 95 % | DIASTOLIC BLOOD PRESSURE: 66 MMHG | SYSTOLIC BLOOD PRESSURE: 116 MMHG | BODY MASS INDEX: 29.88 KG/M2 | WEIGHT: 162.38 LBS

## 2025-05-09 DIAGNOSIS — Z12.31 SCREENING MAMMOGRAM, ENCOUNTER FOR: ICD-10-CM

## 2025-05-09 DIAGNOSIS — G43.009 MIGRAINE WITHOUT AURA AND WITHOUT STATUS MIGRAINOSUS, NOT INTRACTABLE: Primary | ICD-10-CM

## 2025-05-09 DIAGNOSIS — Z00.00 WELLNESS EXAMINATION: ICD-10-CM

## 2025-05-09 PROCEDURE — 1159F MED LIST DOCD IN RCRD: CPT | Mod: CPTII,S$GLB,, | Performed by: INTERNAL MEDICINE

## 2025-05-09 PROCEDURE — 99999 PR PBB SHADOW E&M-EST. PATIENT-LVL IV: CPT | Mod: PBBFAC,,, | Performed by: INTERNAL MEDICINE

## 2025-05-09 PROCEDURE — 3078F DIAST BP <80 MM HG: CPT | Mod: CPTII,S$GLB,, | Performed by: INTERNAL MEDICINE

## 2025-05-09 PROCEDURE — 3074F SYST BP LT 130 MM HG: CPT | Mod: CPTII,S$GLB,, | Performed by: INTERNAL MEDICINE

## 2025-05-09 PROCEDURE — 3008F BODY MASS INDEX DOCD: CPT | Mod: CPTII,S$GLB,, | Performed by: INTERNAL MEDICINE

## 2025-05-09 PROCEDURE — 99396 PREV VISIT EST AGE 40-64: CPT | Mod: S$GLB,,, | Performed by: INTERNAL MEDICINE

## 2025-05-09 RX ORDER — ERGOCALCIFEROL 1.25 MG/1
CAPSULE ORAL
Qty: 12 CAPSULE | Refills: 0 | Status: SHIPPED | OUTPATIENT
Start: 2025-05-09

## 2025-05-09 RX ORDER — LUBIPROSTONE 24 UG/1
24 CAPSULE ORAL 2 TIMES DAILY
Qty: 180 CAPSULE | Refills: 3 | Status: SHIPPED | OUTPATIENT
Start: 2025-05-09

## 2025-05-09 RX ORDER — VENLAFAXINE HYDROCHLORIDE 37.5 MG/1
37.5 CAPSULE, EXTENDED RELEASE ORAL DAILY
Qty: 90 CAPSULE | Refills: 3 | Status: SHIPPED | OUTPATIENT
Start: 2025-05-09 | End: 2026-05-09

## 2025-06-09 NOTE — PROGRESS NOTES
Subjective:       Patient ID: Nora Conklin is a 50 y.o. female.    Chief Complaint: Annual Exam    HPIPt is feeling well overall - migraines are controlled.  No CP or SOB.  Review of Systems   Respiratory:  Negative for shortness of breath (PND or orthopnea).    Cardiovascular:  Negative for chest pain (arm pain or jaw pain).   Gastrointestinal:  Negative for abdominal pain, diarrhea, nausea and vomiting.   Genitourinary:  Negative for dysuria.   Neurological:  Negative for seizures, syncope and headaches.       Objective:      Physical Exam  Constitutional:       General: She is not in acute distress.     Appearance: She is well-developed.   HENT:      Head: Normocephalic.   Eyes:      Pupils: Pupils are equal, round, and reactive to light.   Neck:      Thyroid: No thyromegaly.      Vascular: No JVD.   Cardiovascular:      Rate and Rhythm: Normal rate and regular rhythm.      Heart sounds: Normal heart sounds. No murmur heard.     No friction rub. No gallop.   Pulmonary:      Effort: Pulmonary effort is normal.      Breath sounds: Normal breath sounds. No wheezing or rales.   Abdominal:      General: Bowel sounds are normal. There is no distension.      Palpations: Abdomen is soft. There is no mass.      Tenderness: There is no abdominal tenderness. There is no guarding or rebound.   Musculoskeletal:      Cervical back: Neck supple.   Lymphadenopathy:      Cervical: No cervical adenopathy.   Skin:     General: Skin is warm and dry.   Neurological:      Mental Status: She is alert and oriented to person, place, and time.      Deep Tendon Reflexes: Reflexes are normal and symmetric.   Psychiatric:         Behavior: Behavior normal.         Thought Content: Thought content normal.         Judgment: Judgment normal.         Assessment:       1. Migraine without aura and without status migrainosus, not intractable    2. Wellness examination    3. Screening mammogram, encounter for        Plan:   Migraine  without aura and without status migrainosus, not intractable  Controlled - continue current meds    Wellness examination  -     CBC Auto Differential; Future; Expected date: 05/09/2025  -     Comprehensive Metabolic Panel; Future; Expected date: 05/09/2025  -     Lipid Panel; Future; Expected date: 05/09/2025  -     TSH; Future; Expected date: 05/09/2025  -     Hemoglobin A1C; Future; Expected date: 05/09/2025  -     Vitamin D; Future; Expected date: 05/09/2025  -     Urinalysis; Future; Expected date: 05/09/2025  -     Urinalysis Microscopic; Future; Expected date: 05/09/2025    Screening mammogram, encounter for  -     Mammo Digital Screening Bilat w/ Christian (XPD); Future; Expected date: 11/09/2025    Other orders  -     lubiprostone (AMITIZA) 24 MCG Cap; Take 1 capsule (24 mcg total) by mouth 2 (two) times daily.  Dispense: 180 capsule; Refill: 3  -     venlafaxine (EFFEXOR-XR) 37.5 MG 24 hr capsule; Take 1 capsule (37.5 mg total) by mouth once daily.  Dispense: 90 capsule; Refill: 3  -     ergocalciferol (ERGOCALCIFEROL) 50,000 unit Cap; take One weekly for 12 weeks then 2,000 IU daily thereafter  Dispense: 12 capsule; Refill: 0

## 2025-06-14 ENCOUNTER — HOSPITAL ENCOUNTER (OUTPATIENT)
Dept: RADIOLOGY | Facility: HOSPITAL | Age: 50
Discharge: HOME | End: 2025-06-14
Attending: INTERNAL MEDICINE
Payer: COMMERCIAL

## 2025-06-14 DIAGNOSIS — Z12.31 SCREENING MAMMOGRAM, ENCOUNTER FOR: ICD-10-CM

## 2025-06-14 PROCEDURE — 77067 SCR MAMMO BI INCL CAD: CPT | Mod: TC

## 2025-06-14 PROCEDURE — 77067 SCR MAMMO BI INCL CAD: CPT | Mod: 26,,, | Performed by: RADIOLOGY

## 2025-06-14 PROCEDURE — 77063 BREAST TOMOSYNTHESIS BI: CPT | Mod: 26,,, | Performed by: RADIOLOGY

## 2025-07-08 DIAGNOSIS — F98.8 ATTENTION DEFICIT DISORDER, UNSPECIFIED TYPE: ICD-10-CM

## 2025-07-08 NOTE — TELEPHONE ENCOUNTER
No care due was identified.  [facility] Embedded Care Due Messages. Reference number: [id number].   7/08/2025 3:03:33 PM CDT

## 2025-07-12 RX ORDER — DEXTROAMPHETAMINE SACCHARATE, AMPHETAMINE ASPARTATE MONOHYDRATE, DEXTROAMPHETAMINE SULFATE AND AMPHETAMINE SULFATE 3.75; 3.75; 3.75; 3.75 MG/1; MG/1; MG/1; MG/1
15 CAPSULE, EXTENDED RELEASE ORAL EVERY MORNING
Qty: 30 CAPSULE | Refills: 0 | Status: SHIPPED | OUTPATIENT
Start: 2025-07-12

## 2025-08-14 ENCOUNTER — LAB VISIT (OUTPATIENT)
Dept: LAB | Facility: HOSPITAL | Age: 50
End: 2025-08-14
Attending: INTERNAL MEDICINE
Payer: COMMERCIAL

## 2025-08-14 ENCOUNTER — TELEPHONE (OUTPATIENT)
Dept: INTERNAL MEDICINE | Facility: CLINIC | Age: 50
End: 2025-08-14
Payer: COMMERCIAL

## 2025-08-14 DIAGNOSIS — R30.0 DYSURIA: ICD-10-CM

## 2025-08-14 DIAGNOSIS — R30.0 DYSURIA: Primary | ICD-10-CM

## 2025-08-14 LAB
BACTERIA #/AREA URNS AUTO: ABNORMAL /HPF
BILIRUB UR QL STRIP.AUTO: NEGATIVE
CLARITY UR: ABNORMAL
COLOR UR AUTO: YELLOW
GLUCOSE UR QL STRIP: NEGATIVE
HGB UR QL STRIP: ABNORMAL
KETONES UR QL STRIP: ABNORMAL
LEUKOCYTE ESTERASE UR QL STRIP: ABNORMAL
MICROSCOPIC COMMENT: ABNORMAL
NITRITE UR QL STRIP: POSITIVE
PH UR STRIP: 6 [PH]
PROT UR QL STRIP: ABNORMAL
RBC #/AREA URNS AUTO: 8 /HPF (ref 0–4)
SP GR UR STRIP: >=1.03
SQUAMOUS #/AREA URNS AUTO: 3 /HPF
UROBILINOGEN UR STRIP-ACNC: ABNORMAL EU/DL
WBC #/AREA URNS AUTO: 36 /HPF (ref 0–5)

## 2025-08-14 PROCEDURE — 81003 URINALYSIS AUTO W/O SCOPE: CPT

## 2025-08-14 PROCEDURE — 87077 CULTURE AEROBIC IDENTIFY: CPT

## 2025-08-15 ENCOUNTER — RESULTS FOLLOW-UP (OUTPATIENT)
Dept: INTERNAL MEDICINE | Facility: CLINIC | Age: 50
End: 2025-08-15
Payer: COMMERCIAL

## 2025-08-15 LAB — HOLD SPECIMEN: NORMAL

## 2025-08-15 RX ORDER — SULFAMETHOXAZOLE AND TRIMETHOPRIM 800; 160 MG/1; MG/1
1 TABLET ORAL 2 TIMES DAILY
Qty: 6 TABLET | Refills: 0 | Status: SHIPPED | OUTPATIENT
Start: 2025-08-15 | End: 2025-08-18

## 2025-08-18 LAB — BACTERIA UR CULT: ABNORMAL

## 2025-08-25 DIAGNOSIS — F98.8 ATTENTION DEFICIT DISORDER, UNSPECIFIED TYPE: ICD-10-CM

## 2025-08-25 RX ORDER — DEXTROAMPHETAMINE SACCHARATE, AMPHETAMINE ASPARTATE MONOHYDRATE, DEXTROAMPHETAMINE SULFATE AND AMPHETAMINE SULFATE 3.75; 3.75; 3.75; 3.75 MG/1; MG/1; MG/1; MG/1
15 CAPSULE, EXTENDED RELEASE ORAL EVERY MORNING
Qty: 30 CAPSULE | Refills: 0 | Status: CANCELLED | OUTPATIENT
Start: 2025-08-25

## 2025-08-29 DIAGNOSIS — F98.8 ATTENTION DEFICIT DISORDER, UNSPECIFIED TYPE: ICD-10-CM

## 2025-08-29 RX ORDER — DEXTROAMPHETAMINE SACCHARATE, AMPHETAMINE ASPARTATE MONOHYDRATE, DEXTROAMPHETAMINE SULFATE AND AMPHETAMINE SULFATE 3.75; 3.75; 3.75; 3.75 MG/1; MG/1; MG/1; MG/1
15 CAPSULE, EXTENDED RELEASE ORAL EVERY MORNING
Qty: 30 CAPSULE | Refills: 0 | Status: SHIPPED | OUTPATIENT
Start: 2025-08-29

## 2025-08-29 RX ORDER — IBUPROFEN 600 MG/1
600 TABLET, FILM COATED ORAL EVERY 6 HOURS PRN
Qty: 30 TABLET | Refills: 1 | Status: SHIPPED | OUTPATIENT
Start: 2025-08-29

## (undated) DEVICE — GLOVE SENSICARE PI SURG 6.5

## (undated) DEVICE — SWAB PROCTO 16 X 1 1/2 ST 2/PK

## (undated) DEVICE — ELECTRODE LOOP 20MMX12MM

## (undated) DEVICE — Device

## (undated) DEVICE — PAD PREP CUFFED NS 24X48IN

## (undated) DEVICE — CONNECTOR TUBING STR 5 IN 1

## (undated) DEVICE — GLOVE SENSICARE PI GRN 6.5

## (undated) DEVICE — ELECTRODE REM PLYHSV RETURN 9

## (undated) DEVICE — PENCIL ELECTROSURG HOLST W/BLD

## (undated) DEVICE — SOL IRR SOD CHL .9% POUR

## (undated) DEVICE — SOL POVIDONE SCRUB IODINE 4 OZ

## (undated) DEVICE — SOL POVIDONE PREP IODINE 4 OZ

## (undated) DEVICE — JELLY SURGILUBE 5GR

## (undated) DEVICE — SUT 2/0 30IN SILK BLK BRAI

## (undated) DEVICE — TRAY DRY SKIN SCRUB PREP